# Patient Record
Sex: FEMALE | Race: WHITE | NOT HISPANIC OR LATINO | Employment: OTHER | URBAN - METROPOLITAN AREA
[De-identification: names, ages, dates, MRNs, and addresses within clinical notes are randomized per-mention and may not be internally consistent; named-entity substitution may affect disease eponyms.]

---

## 2017-01-16 RX ORDER — CARVEDILOL 6.25 MG/1
TABLET ORAL
Qty: 60 TABLET | Refills: 0 | Status: SHIPPED | OUTPATIENT
Start: 2017-01-16 | End: 2018-07-27 | Stop reason: SDUPTHER

## 2017-05-14 ENCOUNTER — APPOINTMENT (OUTPATIENT)
Dept: GENERAL RADIOLOGY | Facility: HOSPITAL | Age: 77
End: 2017-05-14

## 2017-05-14 ENCOUNTER — HOSPITAL ENCOUNTER (EMERGENCY)
Facility: HOSPITAL | Age: 77
Discharge: HOME | End: 2017-05-14
Attending: EMERGENCY MEDICINE | Admitting: EMERGENCY MEDICINE

## 2017-05-14 ENCOUNTER — APPOINTMENT (OUTPATIENT)
Dept: CT IMAGING | Facility: HOSPITAL | Age: 77
End: 2017-05-14

## 2017-05-14 VITALS
DIASTOLIC BLOOD PRESSURE: 61 MMHG | HEART RATE: 63 BPM | TEMPERATURE: 97.4 F | OXYGEN SATURATION: 97 % | HEIGHT: 68 IN | SYSTOLIC BLOOD PRESSURE: 157 MMHG | WEIGHT: 190 LBS | BODY MASS INDEX: 28.79 KG/M2 | RESPIRATION RATE: 18 BRPM

## 2017-05-14 DIAGNOSIS — R42 LIGHTHEADEDNESS: Primary | ICD-10-CM

## 2017-05-14 LAB
ALBUMIN SERPL-MCNC: 3.8 G/DL (ref 3.5–5.2)
ALBUMIN/GLOB SERPL: 0.9 G/DL
ALP SERPL-CCNC: 116 U/L (ref 39–117)
ALT SERPL W P-5'-P-CCNC: 24 U/L (ref 1–33)
ANION GAP SERPL CALCULATED.3IONS-SCNC: 12.9 MMOL/L
AST SERPL-CCNC: 27 U/L (ref 1–32)
BASOPHILS # BLD AUTO: 0.13 10*3/MM3 (ref 0–0.2)
BASOPHILS NFR BLD AUTO: 1.7 % (ref 0–1.5)
BILIRUB SERPL-MCNC: 0.7 MG/DL (ref 0.1–1.2)
BILIRUB UR QL STRIP: NEGATIVE
BUN BLD-MCNC: 13 MG/DL (ref 8–23)
BUN/CREAT SERPL: 19.1 (ref 7–25)
CALCIUM SPEC-SCNC: 9.6 MG/DL (ref 8.6–10.5)
CHLORIDE SERPL-SCNC: 96 MMOL/L (ref 98–107)
CLARITY UR: CLEAR
CO2 SERPL-SCNC: 29.1 MMOL/L (ref 22–29)
COLOR UR: YELLOW
CREAT BLD-MCNC: 0.68 MG/DL (ref 0.57–1)
DEPRECATED RDW RBC AUTO: 42.7 FL (ref 37–54)
EOSINOPHIL # BLD AUTO: 0.64 10*3/MM3 (ref 0–0.7)
EOSINOPHIL NFR BLD AUTO: 8.2 % (ref 0.3–6.2)
ERYTHROCYTE [DISTWIDTH] IN BLOOD BY AUTOMATED COUNT: 13 % (ref 11.7–13)
GFR SERPL CREATININE-BSD FRML MDRD: 84 ML/MIN/1.73
GLOBULIN UR ELPH-MCNC: 4.1 GM/DL
GLUCOSE BLD-MCNC: 146 MG/DL (ref 65–99)
GLUCOSE BLDC GLUCOMTR-MCNC: 130 MG/DL (ref 70–130)
GLUCOSE UR STRIP-MCNC: NEGATIVE MG/DL
HCT VFR BLD AUTO: 40.3 % (ref 35.6–45.5)
HGB BLD-MCNC: 13.7 G/DL (ref 11.9–15.5)
HGB UR QL STRIP.AUTO: NEGATIVE
HOLD SPECIMEN: NORMAL
IMM GRANULOCYTES # BLD: 0.03 10*3/MM3 (ref 0–0.03)
IMM GRANULOCYTES NFR BLD: 0.4 % (ref 0–0.5)
KETONES UR QL STRIP: NEGATIVE
LEUKOCYTE ESTERASE UR QL STRIP.AUTO: NEGATIVE
LYMPHOCYTES # BLD AUTO: 1.91 10*3/MM3 (ref 0.9–4.8)
LYMPHOCYTES NFR BLD AUTO: 24.5 % (ref 19.6–45.3)
MAGNESIUM SERPL-MCNC: 1.7 MG/DL (ref 1.6–2.4)
MCH RBC QN AUTO: 30.7 PG (ref 26.9–32)
MCHC RBC AUTO-ENTMCNC: 34 G/DL (ref 32.4–36.3)
MCV RBC AUTO: 90.4 FL (ref 80.5–98.2)
MONOCYTES # BLD AUTO: 0.47 10*3/MM3 (ref 0.2–1.2)
MONOCYTES NFR BLD AUTO: 6 % (ref 5–12)
NEUTROPHILS # BLD AUTO: 4.61 10*3/MM3 (ref 1.9–8.1)
NEUTROPHILS NFR BLD AUTO: 59.2 % (ref 42.7–76)
NITRITE UR QL STRIP: NEGATIVE
PH UR STRIP.AUTO: 7 [PH] (ref 5–8)
PLATELET # BLD AUTO: 274 10*3/MM3 (ref 140–500)
PMV BLD AUTO: 10.9 FL (ref 6–12)
POTASSIUM BLD-SCNC: 4 MMOL/L (ref 3.5–5.2)
PROT SERPL-MCNC: 7.9 G/DL (ref 6–8.5)
PROT UR QL STRIP: NEGATIVE
RBC # BLD AUTO: 4.46 10*6/MM3 (ref 3.9–5.2)
SODIUM BLD-SCNC: 138 MMOL/L (ref 136–145)
SP GR UR STRIP: 1.02 (ref 1–1.03)
T4 FREE SERPL-MCNC: 1.11 NG/DL (ref 0.93–1.7)
TROPONIN T SERPL-MCNC: <0.01 NG/ML (ref 0–0.03)
TSH SERPL DL<=0.05 MIU/L-ACNC: 5.93 MIU/ML (ref 0.27–4.2)
UROBILINOGEN UR QL STRIP: NORMAL
WBC NRBC COR # BLD: 7.79 10*3/MM3 (ref 4.5–10.7)
WHOLE BLOOD HOLD SPECIMEN: NORMAL

## 2017-05-14 PROCEDURE — 84443 ASSAY THYROID STIM HORMONE: CPT | Performed by: EMERGENCY MEDICINE

## 2017-05-14 PROCEDURE — 85025 COMPLETE CBC W/AUTO DIFF WBC: CPT | Performed by: EMERGENCY MEDICINE

## 2017-05-14 PROCEDURE — 84484 ASSAY OF TROPONIN QUANT: CPT | Performed by: EMERGENCY MEDICINE

## 2017-05-14 PROCEDURE — 93010 ELECTROCARDIOGRAM REPORT: CPT | Performed by: INTERNAL MEDICINE

## 2017-05-14 PROCEDURE — 93005 ELECTROCARDIOGRAM TRACING: CPT

## 2017-05-14 PROCEDURE — 82962 GLUCOSE BLOOD TEST: CPT

## 2017-05-14 PROCEDURE — 71010 HC CHEST PA OR AP: CPT

## 2017-05-14 PROCEDURE — 81003 URINALYSIS AUTO W/O SCOPE: CPT | Performed by: EMERGENCY MEDICINE

## 2017-05-14 PROCEDURE — 83735 ASSAY OF MAGNESIUM: CPT | Performed by: EMERGENCY MEDICINE

## 2017-05-14 PROCEDURE — 80053 COMPREHEN METABOLIC PANEL: CPT | Performed by: EMERGENCY MEDICINE

## 2017-05-14 PROCEDURE — 99284 EMERGENCY DEPT VISIT MOD MDM: CPT

## 2017-05-14 PROCEDURE — 70450 CT HEAD/BRAIN W/O DYE: CPT

## 2017-05-14 PROCEDURE — 84439 ASSAY OF FREE THYROXINE: CPT | Performed by: EMERGENCY MEDICINE

## 2017-05-14 PROCEDURE — 36415 COLL VENOUS BLD VENIPUNCTURE: CPT | Performed by: EMERGENCY MEDICINE

## 2017-05-14 RX ORDER — SODIUM CHLORIDE 0.9 % (FLUSH) 0.9 %
10 SYRINGE (ML) INJECTION AS NEEDED
Status: DISCONTINUED | OUTPATIENT
Start: 2017-05-14 | End: 2017-05-14 | Stop reason: HOSPADM

## 2018-04-23 ENCOUNTER — HOSPITAL ENCOUNTER (OUTPATIENT)
Dept: CARDIOLOGY | Facility: HOSPITAL | Age: 78
Discharge: HOME | End: 2018-04-23
Admitting: INTERNAL MEDICINE

## 2018-04-23 ENCOUNTER — TRANSCRIBE ORDERS (OUTPATIENT)
Dept: ADMINISTRATIVE | Facility: HOSPITAL | Age: 78
End: 2018-04-23

## 2018-04-23 ENCOUNTER — APPOINTMENT (OUTPATIENT)
Dept: CARDIOLOGY | Facility: HOSPITAL | Age: 78
End: 2018-04-23

## 2018-04-23 DIAGNOSIS — R60.9 SWELLING: ICD-10-CM

## 2018-04-23 DIAGNOSIS — R52 PAIN: ICD-10-CM

## 2018-04-23 DIAGNOSIS — R52 PAIN: Primary | ICD-10-CM

## 2018-04-23 LAB
BH CV LOWER VASCULAR LEFT COMMON FEMORAL AUGMENT: NORMAL
BH CV LOWER VASCULAR LEFT COMMON FEMORAL COMPETENT: NORMAL
BH CV LOWER VASCULAR LEFT COMMON FEMORAL COMPRESS: NORMAL
BH CV LOWER VASCULAR LEFT COMMON FEMORAL PHASIC: NORMAL
BH CV LOWER VASCULAR LEFT COMMON FEMORAL SPONT: NORMAL
BH CV LOWER VASCULAR RIGHT COMMON FEMORAL AUGMENT: NORMAL
BH CV LOWER VASCULAR RIGHT COMMON FEMORAL COMPETENT: NORMAL
BH CV LOWER VASCULAR RIGHT COMMON FEMORAL COMPRESS: NORMAL
BH CV LOWER VASCULAR RIGHT COMMON FEMORAL PHASIC: NORMAL
BH CV LOWER VASCULAR RIGHT COMMON FEMORAL SPONT: NORMAL
BH CV LOWER VASCULAR RIGHT DISTAL FEMORAL COMPRESS: NORMAL
BH CV LOWER VASCULAR RIGHT GASTRONEMIUS COMPRESS: NORMAL
BH CV LOWER VASCULAR RIGHT GREATER SAPH AK COMPRESS: NORMAL
BH CV LOWER VASCULAR RIGHT GREATER SAPH BK COMPRESS: NORMAL
BH CV LOWER VASCULAR RIGHT MID FEMORAL AUGMENT: NORMAL
BH CV LOWER VASCULAR RIGHT MID FEMORAL COMPETENT: NORMAL
BH CV LOWER VASCULAR RIGHT MID FEMORAL COMPRESS: NORMAL
BH CV LOWER VASCULAR RIGHT MID FEMORAL PHASIC: NORMAL
BH CV LOWER VASCULAR RIGHT MID FEMORAL SPONT: NORMAL
BH CV LOWER VASCULAR RIGHT PERONEAL COMPRESS: NORMAL
BH CV LOWER VASCULAR RIGHT POPLITEAL AUGMENT: NORMAL
BH CV LOWER VASCULAR RIGHT POPLITEAL COMPETENT: NORMAL
BH CV LOWER VASCULAR RIGHT POPLITEAL COMPRESS: NORMAL
BH CV LOWER VASCULAR RIGHT POPLITEAL PHASIC: NORMAL
BH CV LOWER VASCULAR RIGHT POPLITEAL SPONT: NORMAL
BH CV LOWER VASCULAR RIGHT POSTERIOR TIBIAL COMPRESS: NORMAL
BH CV LOWER VASCULAR RIGHT PROXIMAL FEMORAL COMPRESS: NORMAL
BH CV LOWER VASCULAR RIGHT SAPHENOFEMORAL JUNCTION AUGMENT: NORMAL
BH CV LOWER VASCULAR RIGHT SAPHENOFEMORAL JUNCTION COMPETENT: NORMAL
BH CV LOWER VASCULAR RIGHT SAPHENOFEMORAL JUNCTION COMPRESS: NORMAL
BH CV LOWER VASCULAR RIGHT SAPHENOFEMORAL JUNCTION PHASIC: NORMAL
BH CV LOWER VASCULAR RIGHT SAPHENOFEMORAL JUNCTION SPONT: NORMAL

## 2018-04-23 PROCEDURE — 93971 EXTREMITY STUDY: CPT

## 2018-07-27 ENCOUNTER — TELEPHONE (OUTPATIENT)
Dept: CARDIOLOGY | Facility: CLINIC | Age: 78
End: 2018-07-27

## 2018-07-27 ENCOUNTER — OFFICE VISIT (OUTPATIENT)
Dept: CARDIOLOGY | Facility: CLINIC | Age: 78
End: 2018-07-27

## 2018-07-27 ENCOUNTER — LAB (OUTPATIENT)
Dept: LAB | Facility: HOSPITAL | Age: 78
End: 2018-07-27

## 2018-07-27 VITALS
SYSTOLIC BLOOD PRESSURE: 160 MMHG | DIASTOLIC BLOOD PRESSURE: 80 MMHG | WEIGHT: 195.4 LBS | HEART RATE: 115 BPM | HEIGHT: 68 IN | BODY MASS INDEX: 29.61 KG/M2

## 2018-07-27 DIAGNOSIS — E78.49 OTHER HYPERLIPIDEMIA: ICD-10-CM

## 2018-07-27 DIAGNOSIS — I48.0 PAROXYSMAL ATRIAL FIBRILLATION (HCC): Primary | ICD-10-CM

## 2018-07-27 DIAGNOSIS — I48.91 ATRIAL FIBRILLATION, NEW ONSET (HCC): ICD-10-CM

## 2018-07-27 DIAGNOSIS — I48.0 PAROXYSMAL ATRIAL FIBRILLATION (HCC): ICD-10-CM

## 2018-07-27 DIAGNOSIS — I10 ESSENTIAL HYPERTENSION: ICD-10-CM

## 2018-07-27 LAB
ANION GAP SERPL CALCULATED.3IONS-SCNC: 14.1 MMOL/L
BUN BLD-MCNC: 14 MG/DL (ref 8–23)
BUN/CREAT SERPL: 17.7 (ref 7–25)
CALCIUM SPEC-SCNC: 9.8 MG/DL (ref 8.6–10.5)
CHLORIDE SERPL-SCNC: 98 MMOL/L (ref 98–107)
CO2 SERPL-SCNC: 27.9 MMOL/L (ref 22–29)
CREAT BLD-MCNC: 0.79 MG/DL (ref 0.57–1)
GFR SERPL CREATININE-BSD FRML MDRD: 71 ML/MIN/1.73
GLUCOSE BLD-MCNC: 140 MG/DL (ref 65–99)
POTASSIUM BLD-SCNC: 4.5 MMOL/L (ref 3.5–5.2)
SODIUM BLD-SCNC: 140 MMOL/L (ref 136–145)
TSH SERPL DL<=0.05 MIU/L-ACNC: 3.06 MIU/ML (ref 0.27–4.2)

## 2018-07-27 PROCEDURE — 80048 BASIC METABOLIC PNL TOTAL CA: CPT

## 2018-07-27 PROCEDURE — 84443 ASSAY THYROID STIM HORMONE: CPT

## 2018-07-27 PROCEDURE — 99214 OFFICE O/P EST MOD 30 MIN: CPT | Performed by: NURSE PRACTITIONER

## 2018-07-27 PROCEDURE — 93000 ELECTROCARDIOGRAM COMPLETE: CPT | Performed by: NURSE PRACTITIONER

## 2018-07-27 PROCEDURE — 36415 COLL VENOUS BLD VENIPUNCTURE: CPT

## 2018-07-27 RX ORDER — CARVEDILOL 6.25 MG/1
12.5 TABLET ORAL 2 TIMES DAILY
Qty: 60 TABLET | Refills: 0 | Status: SHIPPED | OUTPATIENT
Start: 2018-07-27 | End: 2018-08-29 | Stop reason: SDUPTHER

## 2018-07-27 RX ORDER — LEVOTHYROXINE SODIUM 0.03 MG/1
TABLET ORAL
COMMUNITY

## 2018-07-27 NOTE — TELEPHONE ENCOUNTER
Spoke with patient about stable BMP and normal TSH.  She is scheduled for echocardiogram next Tuesday  AL

## 2018-07-27 NOTE — PROGRESS NOTES
Date of Office Visit: 2018  Encounter Provider: ESTEVAN Zafar  Place of Service: [facility] CARDIOLOGY  Patient Name: Jordana Hdz  :1940    Chief Complaint   Patient presents with   • Atrial Fibrillation   • Hypertension   • Palpitations   • Shortness of Breath   • Dizziness   • Fatigue   :     HPI: Jordana Hdz is a 77 y.o. female is a patient of Dr. Pérez. I am seeing her today for the first time and have reviewed her record.     Her past medical history is significant of hypertension, hyperlipidemia, and atrial flutter.    History of palpitation and has had 1 episode of atrial flutter.  Ablation was discussed but it was declined.  She also had PVCs.  She was last seen in the office in 2016.  She had an occasional skipped beat sensation which was rare but no fluttering sensation.    SHe had an ER visit ion 2017 with complaint of dizziness and a near syncope episdoe which had resolved upon arrival.SHe also reported the week prior having anepisode of rapid palpitation which resolved quickly. TSH was elevated, Cr0.68, GFR84. EKG NSR. She was advised to follow up with cardiologist.    Patient presents today  and denies chest pain. She has been experiencing dizziness with standing x3 weeks as well as increased dyspnea on exertion, and occasional fatigue. She checked her pulse which was irregular and rapid. She did not call our office. She is finishing antibiotic for a salivary gland infection. Her pulse yesterday was 111 blood pressure was 116/70.She has been taking ASA 81 mg daily and coreg 6.25 BID. She does snore and has never been tested for sleep apnea.      No Known Allergies    Past Medical History:   Diagnosis Date   • Atrial flutter (CMS/HCC)    • Hyperlipidemia    • Hypertension    • Lightheadedness    • Palpitations        Past Surgical History:   Procedure Laterality Date   • CATARACT EXTRACTION     • TUBAL ABDOMINAL LIGATION    "        Family and social history reviewed.     Review of Systems   Constitution: Positive for malaise/fatigue.   Cardiovascular: Positive for dyspnea on exertion and palpitations.   Respiratory: Positive for shortness of breath and snoring.    Musculoskeletal: Positive for joint pain.   Neurological: Positive for light-headedness (with standing).     All other systems were reviewed and are negative          Objective:     Vitals:    07/27/18 1005   BP: 160/80   BP Location: Left arm   Patient Position: Sitting   Pulse: 115   Weight: 88.6 kg (195 lb 6.4 oz)   Height: 172.7 cm (68\")     Body mass index is 29.71 kg/m².    PHYSICAL EXAM:  Physical Exam   Constitutional: She is oriented to person, place, and time. She appears well-developed and well-nourished. No distress.   HENT:   Head: Normocephalic.   Eyes: Conjunctivae are normal.   Neck: Normal range of motion. No JVD present.   Cardiovascular: Normal heart sounds and intact distal pulses.  An irregular rhythm present. Tachycardia present.    No murmur heard.  Pulses:       Carotid pulses are 2+ on the right side, and 2+ on the left side.       Radial pulses are 2+ on the right side, and 2+ on the left side.        Posterior tibial pulses are 2+ on the right side, and 2+ on the left side.   Pulmonary/Chest: Effort normal and breath sounds normal. No respiratory distress. She has no wheezes. She has no rhonchi. She has no rales. She exhibits no tenderness.   Abdominal: Soft. Bowel sounds are normal. She exhibits no distension.   Musculoskeletal: Normal range of motion. She exhibits no edema.   Neurological: She is alert and oriented to person, place, and time.   Skin: Skin is warm, dry and intact. No rash noted. She is not diaphoretic. No cyanosis.   Psychiatric: She has a normal mood and affect. Her behavior is normal. Judgment and thought content normal.         ECG 12 Lead  Date/Time: 7/27/2018 9:06 PM  Performed by: SACR JENKINS  Authorized by: SCAR JENKINS "   Comparison: compared with previous ECG from 9/7/2016  Comparison to previous ECG: New atrial fibrillation  Rhythm: atrial fibrillation  Ectopy: PVCs  Rate: tachycardic  BPM: 115  QRS axis: normal  Clinical impression: abnormal ECG and dysrhythmia - atrial            Current Outpatient Prescriptions   Medication Sig Dispense Refill   • atorvastatin (LIPITOR) 40 MG tablet Take  by mouth daily.     • carvedilol (COREG) 6.25 MG tablet Take 2 tablets by mouth 2 (Two) Times a Day. 60 tablet 0   • Cetirizine HCl (ZYRTEC ALLERGY) 10 MG capsule Take  by mouth.     • indapamide (LOZOL) 1.25 MG tablet Take  by mouth daily.     • levothyroxine (SYNTHROID, LEVOTHROID) 25 MCG tablet Take 25 mcg by mouth Daily.     • omeprazole OTC (PRILOSEC OTC) 20 MG EC tablet Take  by mouth daily.     • sertraline (ZOLOFT) 50 MG tablet Take  by mouth daily.     • apixaban (ELIQUIS) 5 MG tablet tablet Take 1 tablet by mouth Every 12 (Twelve) Hours. 60 tablet 11     No current facility-administered medications for this visit.      Assessment:       Diagnosis Plan   1. Paroxysmal atrial fibrillation (CMS/HCC)  Basic Metabolic Panel    TSH   2. Atrial fibrillation, new onset (CMS/HCC)  Adult Transthoracic Echo Complete W/ Cont if Necessary Per Protocol   3. Essential hypertension          Orders Placed This Encounter   Procedures   • Basic Metabolic Panel     Standing Status:   Future     Number of Occurrences:   1     Standing Expiration Date:   7/28/2019   • TSH     Standing Status:   Future     Number of Occurrences:   1     Standing Expiration Date:   7/28/2019   • ECG 12 Lead     This order was created via procedure documentation   • Adult Transthoracic Echo Complete W/ Cont if Necessary Per Protocol     Standing Status:   Future     Standing Expiration Date:   7/27/2019     Order Specific Question:   Reason for exam?     Answer:   Arrhythmia     Order Specific Question:   Arrhythmias specification?     Answer:   AFib         Plan:   1.  Paroxysmal atrial fibrillation- Heart rate today 115. This is new for her since one episode of atrial flutter. Will increase carvedilol to 12.5 mg BID. BMP today WNL, TSH normal. Start Eliquis 5 mg BID. Samples supplied. She will monitor home heart rates and blood pressure. She will also have an echocardiogram.  Atrial Fibrillation and Atrial Flutter  Assessment  • The patient has paroxysmal atrial fibrillation  • The patient's CHADS2-VASc score is 4  • A [id number] score of 2 or more is considered a high risk for a thromboembolic event    Plan  • Add apixaban for antithrombotic therapy  • Continue beta blocker for rhythm control    2. PVCs  3. Hypertension blood pressure elevated today 160/80. Patient has lower values at home such as 110/70  4. Suspected sleep apnea she will consider being tested in the future  5.Hypothyroidism on replacement therapy  6.Hyperlipidemia on target dose atorvastatin  7.. Salivary gland infection completing antibiotic therapy    Plan of care reviewed with Dr. Pérez  Follow up in 4 weeks with Dr. Pérez    Patient was instructed to call the office if new symptoms develop or report to nearest ER if heart attack or stroke is suspected.        It has been a pleasure to participate in this patient's care.      Thank you,  ESTEVAN Zafar      **Lucius Disclaimer:**  Much of this encounter note is an electronic transcription/translation of spoken language to printed text. The electronic translation of spoken language may permit erroneous, or at times, nonsensical words or phrases to be inadvertently transcribed. Although I have reviewed the note for such errors, some may still exist.

## 2018-07-31 ENCOUNTER — TELEPHONE (OUTPATIENT)
Dept: CARDIOLOGY | Facility: CLINIC | Age: 78
End: 2018-07-31

## 2018-07-31 ENCOUNTER — HOSPITAL ENCOUNTER (OUTPATIENT)
Dept: CARDIOLOGY | Facility: HOSPITAL | Age: 78
Discharge: HOME | End: 2018-07-31
Admitting: NURSE PRACTITIONER

## 2018-07-31 VITALS
BODY MASS INDEX: 29.55 KG/M2 | HEIGHT: 68 IN | HEART RATE: 100 BPM | DIASTOLIC BLOOD PRESSURE: 68 MMHG | SYSTOLIC BLOOD PRESSURE: 128 MMHG | WEIGHT: 195 LBS

## 2018-07-31 DIAGNOSIS — I48.91 ATRIAL FIBRILLATION, NEW ONSET (HCC): ICD-10-CM

## 2018-07-31 LAB
ASCENDING AORTA: 2.7 CM
BH CV ECHO MEAS - ACS: 1.7 CM
BH CV ECHO MEAS - AO MAX PG (FULL): 3.8 MMHG
BH CV ECHO MEAS - AO MAX PG: 8.4 MMHG
BH CV ECHO MEAS - AO MEAN PG (FULL): 2.8 MMHG
BH CV ECHO MEAS - AO MEAN PG: 5.1 MMHG
BH CV ECHO MEAS - AO ROOT AREA (BSA CORRECTED): 1.2
BH CV ECHO MEAS - AO ROOT AREA: 4.6 CM^2
BH CV ECHO MEAS - AO ROOT DIAM: 2.4 CM
BH CV ECHO MEAS - AO V2 MAX: 144.8 CM/SEC
BH CV ECHO MEAS - AO V2 MEAN: 106.2 CM/SEC
BH CV ECHO MEAS - AO V2 VTI: 27.3 CM
BH CV ECHO MEAS - AVA(I,A): 2.1 CM^2
BH CV ECHO MEAS - AVA(I,D): 2.1 CM^2
BH CV ECHO MEAS - AVA(V,A): 1.9 CM^2
BH CV ECHO MEAS - AVA(V,D): 1.9 CM^2
BH CV ECHO MEAS - BSA(HAYCOCK): 2.1 M^2
BH CV ECHO MEAS - BSA: 2 M^2
BH CV ECHO MEAS - BZI_BMI: 29.6 KILOGRAMS/M^2
BH CV ECHO MEAS - BZI_METRIC_HEIGHT: 172.7 CM
BH CV ECHO MEAS - BZI_METRIC_WEIGHT: 88.5 KG
BH CV ECHO MEAS - CONTRAST EF (2CH): 58.9 ML/M^2
BH CV ECHO MEAS - CONTRAST EF 4CH: 63.1 ML/M^2
BH CV ECHO MEAS - EDV(MOD-SP2): 73 ML
BH CV ECHO MEAS - EDV(MOD-SP4): 84 ML
BH CV ECHO MEAS - EDV(TEICH): 89.3 ML
BH CV ECHO MEAS - EF(CUBED): 78.8 %
BH CV ECHO MEAS - EF(MOD-BP): 61 %
BH CV ECHO MEAS - EF(MOD-SP2): 58.9 %
BH CV ECHO MEAS - EF(MOD-SP4): 63.1 %
BH CV ECHO MEAS - EF(TEICH): 71.3 %
BH CV ECHO MEAS - ESV(MOD-SP2): 30 ML
BH CV ECHO MEAS - ESV(MOD-SP4): 31 ML
BH CV ECHO MEAS - ESV(TEICH): 25.7 ML
BH CV ECHO MEAS - IVS/LVPW: 1.1
BH CV ECHO MEAS - IVSD: 1 CM
BH CV ECHO MEAS - LAT PEAK E' VEL: 14 CM/SEC
BH CV ECHO MEAS - LV DIASTOLIC VOL/BSA (35-75): 41.5 ML/M^2
BH CV ECHO MEAS - LV MASS(C)D: 144.9 GRAMS
BH CV ECHO MEAS - LV MASS(C)DI: 71.6 GRAMS/M^2
BH CV ECHO MEAS - LV MAX PG: 4.6 MMHG
BH CV ECHO MEAS - LV MEAN PG: 2.3 MMHG
BH CV ECHO MEAS - LV SYSTOLIC VOL/BSA (12-30): 15.3 ML/M^2
BH CV ECHO MEAS - LV V1 MAX: 107.1 CM/SEC
BH CV ECHO MEAS - LV V1 MEAN: 70.5 CM/SEC
BH CV ECHO MEAS - LV V1 VTI: 21.8 CM
BH CV ECHO MEAS - LVIDD: 4.4 CM
BH CV ECHO MEAS - LVIDS: 2.6 CM
BH CV ECHO MEAS - LVLD AP2: 6.8 CM
BH CV ECHO MEAS - LVLD AP4: 7.1 CM
BH CV ECHO MEAS - LVLS AP2: 5.8 CM
BH CV ECHO MEAS - LVLS AP4: 5.7 CM
BH CV ECHO MEAS - LVOT AREA (M): 2.5 CM^2
BH CV ECHO MEAS - LVOT AREA: 2.6 CM^2
BH CV ECHO MEAS - LVOT DIAM: 1.8 CM
BH CV ECHO MEAS - LVPWD: 0.94 CM
BH CV ECHO MEAS - MED PEAK E' VEL: 10 CM/SEC
BH CV ECHO MEAS - MR MAX PG: 36.2 MMHG
BH CV ECHO MEAS - MR MAX VEL: 301 CM/SEC
BH CV ECHO MEAS - MV DEC TIME: 0.19 SEC
BH CV ECHO MEAS - MV E MAX VEL: 121 CM/SEC
BH CV ECHO MEAS - MV MAX PG: 5.2 MMHG
BH CV ECHO MEAS - MV MEAN PG: 2.1 MMHG
BH CV ECHO MEAS - MV V2 MAX: 114.4 CM/SEC
BH CV ECHO MEAS - MV V2 MEAN: 65.4 CM/SEC
BH CV ECHO MEAS - MV V2 VTI: 18.2 CM
BH CV ECHO MEAS - MVA(VTI): 3.1 CM^2
BH CV ECHO MEAS - PA ACC TIME: 0.11 SEC
BH CV ECHO MEAS - PA MAX PG (FULL): 2.2 MMHG
BH CV ECHO MEAS - PA MAX PG: 3.7 MMHG
BH CV ECHO MEAS - PA PR(ACCEL): 29.9 MMHG
BH CV ECHO MEAS - PA V2 MAX: 96.4 CM/SEC
BH CV ECHO MEAS - PULM A REVS DUR: 0.11 SEC
BH CV ECHO MEAS - PULM A REVS VEL: 18.2 CM/SEC
BH CV ECHO MEAS - PULM DIAS VEL: 55.4 CM/SEC
BH CV ECHO MEAS - PULM S/D: 0.82
BH CV ECHO MEAS - PULM SYS VEL: 45.2 CM/SEC
BH CV ECHO MEAS - RAP SYSTOLE: 3 MMHG
BH CV ECHO MEAS - RV MAX PG: 1.5 MMHG
BH CV ECHO MEAS - RV MEAN PG: 0.84 MMHG
BH CV ECHO MEAS - RV V1 MAX: 61.6 CM/SEC
BH CV ECHO MEAS - RV V1 MEAN: 44.2 CM/SEC
BH CV ECHO MEAS - RV V1 VTI: 11.8 CM
BH CV ECHO MEAS - RVSP: 26 MMHG
BH CV ECHO MEAS - SI(AO): 62.8 ML/M^2
BH CV ECHO MEAS - SI(CUBED): 34 ML/M^2
BH CV ECHO MEAS - SI(LVOT): 27.7 ML/M^2
BH CV ECHO MEAS - SI(MOD-SP2): 21.3 ML/M^2
BH CV ECHO MEAS - SI(MOD-SP4): 26.2 ML/M^2
BH CV ECHO MEAS - SI(TEICH): 31.5 ML/M^2
BH CV ECHO MEAS - SUP REN AO DIAM: 1.5 CM
BH CV ECHO MEAS - SV(AO): 126.9 ML
BH CV ECHO MEAS - SV(CUBED): 68.7 ML
BH CV ECHO MEAS - SV(LVOT): 56 ML
BH CV ECHO MEAS - SV(MOD-SP2): 43 ML
BH CV ECHO MEAS - SV(MOD-SP4): 53 ML
BH CV ECHO MEAS - SV(TEICH): 63.7 ML
BH CV ECHO MEAS - TAPSE (>1.6): 2.3 CM2
BH CV ECHO MEAS - TR MAX VEL: 240 CM/SEC
BH CV ECHO MEASUREMENTS AVERAGE E/E' RATIO: 10.08
BH CV XLRA - RV BASE: 2.3 CM
BH CV XLRA - TDI S': 2.7 CM/SEC
LEFT ATRIUM VOLUME INDEX: 30 ML/M2
SINUS: 2.3 CM
STJ: 2.3 CM

## 2018-07-31 PROCEDURE — 93306 TTE W/DOPPLER COMPLETE: CPT | Performed by: INTERNAL MEDICINE

## 2018-07-31 PROCEDURE — 25010000002 PERFLUTREN (DEFINITY) 8.476 MG IN SODIUM CHLORIDE 0.9 % 10 ML INJECTION: Performed by: NURSE PRACTITIONER

## 2018-07-31 PROCEDURE — 93306 TTE W/DOPPLER COMPLETE: CPT

## 2018-07-31 RX ADMIN — PERFLUTREN 1.5 ML: 6.52 INJECTION, SUSPENSION INTRAVENOUS at 14:48

## 2018-07-31 NOTE — TELEPHONE ENCOUNTER
Spoke with patient. Echo results discussed.    Taking coreg 12.5 BID. HR have been 80-90    BP /70-80 rare diastolic 90 value occurred once.    Current regime will continue.  Patient verbalized understanding.    AL

## 2018-08-13 ENCOUNTER — TELEPHONE (OUTPATIENT)
Dept: CARDIOLOGY | Facility: CLINIC | Age: 78
End: 2018-08-13

## 2018-08-13 NOTE — TELEPHONE ENCOUNTER
Spoke with patient. HR much improved on 12.5 mg BID carvedilol. 70-90s. Reassured that if she takes her medication as directed she will be ok on the trip. She actually see's Dr. Pérez the week before taking the trip.        AL

## 2018-08-13 NOTE — TELEPHONE ENCOUNTER
8/13/18  Pt left vmsg on Dr. MARIA's vm but directed to [facility] she is planning trip to [place] and will be at a 9,000 foot altitude and wondered if it will affect her afib/her.  Her ph [phone]/krissy

## 2018-08-29 ENCOUNTER — OFFICE VISIT (OUTPATIENT)
Dept: CARDIOLOGY | Facility: CLINIC | Age: 78
End: 2018-08-29

## 2018-08-29 VITALS
BODY MASS INDEX: 29.46 KG/M2 | SYSTOLIC BLOOD PRESSURE: 124 MMHG | HEART RATE: 112 BPM | WEIGHT: 194.4 LBS | HEIGHT: 68 IN | DIASTOLIC BLOOD PRESSURE: 84 MMHG

## 2018-08-29 DIAGNOSIS — I48.0 PAROXYSMAL ATRIAL FIBRILLATION (HCC): Primary | ICD-10-CM

## 2018-08-29 DIAGNOSIS — E78.2 MIXED HYPERLIPIDEMIA: ICD-10-CM

## 2018-08-29 DIAGNOSIS — I10 ESSENTIAL HYPERTENSION: ICD-10-CM

## 2018-08-29 PROCEDURE — 93000 ELECTROCARDIOGRAM COMPLETE: CPT | Performed by: INTERNAL MEDICINE

## 2018-08-29 PROCEDURE — 99213 OFFICE O/P EST LOW 20 MIN: CPT | Performed by: INTERNAL MEDICINE

## 2018-08-29 RX ORDER — CARVEDILOL 25 MG/1
25 TABLET ORAL 2 TIMES DAILY
Qty: 60 TABLET | Refills: 6 | Status: SHIPPED | OUTPATIENT
Start: 2018-08-29 | End: 2019-03-23 | Stop reason: SDUPTHER

## 2018-08-29 RX ORDER — OMEPRAZOLE 10 MG/1
20 CAPSULE, DELAYED RELEASE ORAL
COMMUNITY
End: 2020-06-29 | Stop reason: DRUGHIGH

## 2018-08-29 NOTE — PROGRESS NOTES
Date of Office Visit: 18  Encounter Provider: Guicho Pérez MD  Place of Service: [facility] CARDIOLOGY  Patient Name: Jordana Hdz  :1940  Referral Provider:No ref. provider found      Chief Complaint   Patient presents with   • Atrial Fibrillation     1 mos follow up     History of Present Illness  The patient is a pleasant 76 yo who we saw in 2016 with palpitations it appeared that she had an episode of atrial flutter but that was only episode we discussed further treatment with ablation but she declined.  Had a negative workup at that time including an echocardiogram that was unremarkable.  She was to see us when necessary but then in 2018 developed increasing palpitations came back in the office this time was found to be in atrial flutter she had a repeat echocardiogram that showed normal left ventricular systolic function no significant valvular disease she was started on beta blocker carvedilol and eliquis.  She now comes in for follow-up and unfortunately she still short of breath with increased activity but markedly fatigued no palpitations near syncope or syncope no real chest pain or pressure.      Hypertension   Associated symptoms include malaise/fatigue. Pertinent negatives include no blurred vision or headaches.         Past Medical History:   Diagnosis Date   • Atrial flutter (CMS/HCC)    • Hyperlipidemia    • Hypertension    • Lightheadedness    • Palpitations          Past Surgical History:   Procedure Laterality Date   • CATARACT EXTRACTION     • TUBAL ABDOMINAL LIGATION           Current Outpatient Prescriptions on File Prior to Visit   Medication Sig Dispense Refill   • apixaban (ELIQUIS) 5 MG tablet tablet Take 1 tablet by mouth Every 12 (Twelve) Hours. 60 tablet 11   • atorvastatin (LIPITOR) 40 MG tablet Take  by mouth daily.     • indapamide (LOZOL) 1.25 MG tablet Take  by mouth daily.     • levothyroxine (SYNTHROID,  LEVOTHROID) 25 MCG tablet Take 25 mcg by mouth Daily.     • sertraline (ZOLOFT) 50 MG tablet Take  by mouth daily.     • [DISCONTINUED] carvedilol (COREG) 6.25 MG tablet Take 2 tablets by mouth 2 (Two) Times a Day. 60 tablet 0   • Cetirizine HCl (ZYRTEC ALLERGY) 10 MG capsule Take  by mouth.     • [DISCONTINUED] omeprazole OTC (PRILOSEC OTC) 20 MG EC tablet Take  by mouth daily.       No current facility-administered medications on file prior to visit.          Social History     Social History   • Marital status:      Spouse name: N/A   • Number of children: N/A   • Years of education: N/A     Occupational History   • Not on file.     Social History Main Topics   • Smoking status: Former Smoker   • Smokeless tobacco: Never Used      Comment: caffeine 1.5 cups coffee daily   • Alcohol use Yes   • Drug use: No   • Sexual activity: Not on file     Other Topics Concern   • Not on file     Social History Narrative   • No narrative on file       Family History   Problem Relation Age of Onset   • Heart attack Father    • Aortic aneurysm Father    • Stroke Father    • Hypertension Father    • Atrial fibrillation Brother    • Diabetes Maternal Aunt    • Diabetes Paternal Aunt          Review of Systems   Constitution: Positive for malaise/fatigue. Negative for decreased appetite, diaphoresis, fever, weakness, weight gain and weight loss.   HENT: Negative for congestion, hearing loss, nosebleeds and tinnitus.    Eyes: Negative for blurred vision, double vision, vision loss in left eye, vision loss in right eye and visual disturbance.   Cardiovascular:        As noted in HPI   Respiratory:        As noted HPI   Endocrine: Negative for cold intolerance and heat intolerance.   Hematologic/Lymphatic: Negative for bleeding problem. Does not bruise/bleed easily.   Skin: Negative for color change, flushing, itching and rash.   Musculoskeletal: Negative for arthritis, back pain, joint pain, joint swelling, muscle weakness  "and myalgias.   Gastrointestinal: Negative for bloating, abdominal pain, constipation, diarrhea, dysphagia, heartburn, hematemesis, hematochezia, melena, nausea and vomiting.   Genitourinary: Negative for bladder incontinence, dysuria, frequency, nocturia and urgency.   Neurological: Negative for dizziness, focal weakness, headaches, light-headedness, loss of balance, numbness, paresthesias and vertigo.   Psychiatric/Behavioral: Negative for depression, memory loss and substance abuse.       Procedures      ECG 12 Lead  Date/Time: 8/29/2018 1:42 PM  Performed by: ANDREA RUSHING  Authorized by: ANDREA RUSHING   Comparison: compared with previous ECG   Similar to previous ECG  Rhythm: atrial fibrillation  Rate: normal  QRS axis: normal                  Objective:    /84 (BP Location: Right arm)   Pulse 112   Ht 172.7 cm (68\")   Wt 88.2 kg (194 lb 6.4 oz)   BMI 29.56 kg/m²        Physical Exam  Physical Exam   Constitutional: She is oriented to person, place, and time. She appears well-developed and well-nourished. No distress.   HENT:   Head: Normocephalic.   Eyes: Pupils are equal, round, and reactive to light. Conjunctivae are normal. No scleral icterus.   Neck: Normal carotid pulses, no hepatojugular reflux and no JVD present. Carotid bruit is not present. No tracheal deviation, no edema and no erythema present. No thyromegaly present.   Cardiovascular: S1 normal, S2 normal, normal heart sounds and intact distal pulses.  An irregularly irregular rhythm present.  No extrasystoles are present. Tachycardia present.  PMI is not displaced.  Exam reveals no gallop, no distant heart sounds and no friction rub.    No murmur heard.  Pulses:       Carotid pulses are 2+ on the right side, and 2+ on the left side.       Radial pulses are 2+ on the right side, and 2+ on the left side.        Femoral pulses are 2+ on the right side, and 2+ on the left side.       Dorsalis pedis pulses are 2+ on the right side, " and 2+ on the left side.        Posterior tibial pulses are 2+ on the right side, and 2+ on the left side.   Pulmonary/Chest: Effort normal and breath sounds normal. No respiratory distress. She has no decreased breath sounds. She has no wheezes. She has no rhonchi. She has no rales. She exhibits no tenderness.   Abdominal: Soft. Bowel sounds are normal. She exhibits no distension and no mass. There is no hepatosplenomegaly. There is no tenderness. There is no rebound and no guarding.   Musculoskeletal: She exhibits no edema, tenderness or deformity.   Neurological: She is alert and oriented to person, place, and time.   Skin: Skin is warm and dry. No rash noted. She is not diaphoretic. No cyanosis or erythema. No pallor. Nails show no clubbing.   Psychiatric: She has a normal mood and affect. Her speech is normal and behavior is normal. Judgment and thought content normal.           Assessment:   1. 77 showed female with paroxysmal atrial flutter and atrial fibrillation.   Atrial Fibrillation and Atrial Flutter  Assessment  • The patient has permanent atrial fibrillation  • This is non-valvular in etiology  • The patient's CHADS2-VASc score is 2  • A [id number] score of 2 or more is considered a high risk for a thromboembolic event  • Apixaban prescribed    Plan  • Continue in atrial fibrillation with rate control  • Continue apixaban for antithrombotic therapy, bleeding issues discussed  • Continue beta blocker for rate control  Her rate is still increased but she still symptomatic.  Her increasing the carvedilol to 25 mg twice a day.  When concerned about her continued symptoms which hopefully will improve with rate control but we are referring her to arrhythmia service.  May be candidate for ablation may need to be treated medically with cardioversion.    2.  Hyperlipidemia on atorvastatin.    We discussed her trip to [place] in the next week which hopefully if her rate gets under better control she'll feel  better and should be fine.  She states hydrated and will call if any issues.         Plan:

## 2018-09-18 ENCOUNTER — OFFICE VISIT (OUTPATIENT)
Dept: CARDIOLOGY | Facility: CLINIC | Age: 78
End: 2018-09-18

## 2018-09-18 VITALS
HEART RATE: 91 BPM | BODY MASS INDEX: 29.1 KG/M2 | SYSTOLIC BLOOD PRESSURE: 144 MMHG | WEIGHT: 192 LBS | HEIGHT: 68 IN | DIASTOLIC BLOOD PRESSURE: 88 MMHG

## 2018-09-18 DIAGNOSIS — I48.19 PERSISTENT ATRIAL FIBRILLATION (HCC): Primary | ICD-10-CM

## 2018-09-18 PROCEDURE — 93000 ELECTROCARDIOGRAM COMPLETE: CPT | Performed by: INTERNAL MEDICINE

## 2018-09-18 PROCEDURE — 99204 OFFICE O/P NEW MOD 45 MIN: CPT | Performed by: INTERNAL MEDICINE

## 2018-09-18 NOTE — PROGRESS NOTES
Date of Office Visit: 2018  Encounter Provider: Fede Smalls MD  Place of Service: [facility] CARDIOLOGY  Patient Name: Jordana Hdz  :1940    Chief Complaint   Patient presents with   • Atrial Fibrillation     New Patient Consult / MI ref PAF   :     HPI: Jordana Hdz is a 77 y.o. female who presents today for persistent atrial fibrillation.  The patient had an episode of atrial flutter in 2016, she was offered ablation but declined at that time.  She has developed increasing palpitations and some increased dyspnea with exertion.  She notices it when walking from her car into building or to mailbox.  She hasn't noticed any other associated factors.  Recent echo demonstrated normal ejection fraction. Past ekgs demonstrate atrial fibrillation.          Past Medical History:   Diagnosis Date   • Atrial flutter (CMS/HCC)    • Hyperlipidemia    • Hypertension    • Lightheadedness    • Palpitations        Past Surgical History:   Procedure Laterality Date   • CATARACT EXTRACTION     • TUBAL ABDOMINAL LIGATION         Social History     Social History   • Marital status:      Spouse name: N/A   • Number of children: N/A   • Years of education: N/A     Occupational History   • Not on file.     Social History Main Topics   • Smoking status: Former Smoker   • Smokeless tobacco: Never Used      Comment: caffeine 1.5 cups coffee daily   • Alcohol use Yes   • Drug use: No   • Sexual activity: Not on file     Other Topics Concern   • Not on file     Social History Narrative   • No narrative on file       Family History   Problem Relation Age of Onset   • Heart attack Father    • Aortic aneurysm Father    • Stroke Father    • Hypertension Father    • Atrial fibrillation Brother    • Diabetes Maternal Aunt    • Diabetes Paternal Aunt        Review of Systems   Constitution: Negative for weakness and malaise/fatigue.   HENT: Negative.    Eyes: Negative.   "  Cardiovascular: Negative for chest pain, dyspnea on exertion, leg swelling and near-syncope.   Respiratory: Negative for cough and shortness of breath.    Endocrine: Negative.    Hematologic/Lymphatic: Negative.    Skin: Negative.    Musculoskeletal: Negative.    Gastrointestinal: Negative.    Genitourinary: Negative.    Neurological: Negative.    Psychiatric/Behavioral: Negative.    Allergic/Immunologic: Negative.        No Known Allergies      Current Outpatient Prescriptions:   •  apixaban (ELIQUIS) 5 MG tablet tablet, Take 1 tablet by mouth Every 12 (Twelve) Hours., Disp: 60 tablet, Rfl: 11  •  atorvastatin (LIPITOR) 40 MG tablet, Take  by mouth daily., Disp: , Rfl:   •  carvedilol (COREG) 25 MG tablet, Take 1 tablet by mouth 2 (Two) Times a Day., Disp: 60 tablet, Rfl: 6  •  Cetirizine HCl (ZYRTEC ALLERGY) 10 MG capsule, Take  by mouth., Disp: , Rfl:   •  indapamide (LOZOL) 1.25 MG tablet, Take  by mouth daily., Disp: , Rfl:   •  levothyroxine (SYNTHROID, LEVOTHROID) 25 MCG tablet, Take 25 mcg by mouth Daily., Disp: , Rfl:   •  omeprazole (prilOSEC) 10 MG capsule, Take 10 mg by mouth., Disp: , Rfl:   •  sertraline (ZOLOFT) 50 MG tablet, Take  by mouth daily., Disp: , Rfl:       Objective:     Vitals:    09/18/18 0925   BP: 144/88   Pulse: 91   Weight: 87.1 kg (192 lb)   Height: 172.7 cm (68\")     Body mass index is 29.19 kg/m².    PHYSICAL EXAM:    Physical Exam   Constitutional: She is oriented to person, place, and time. She appears well-developed and well-nourished. No distress.   HENT:   Head: Normocephalic and atraumatic.   Right Ear: External ear normal.   Left Ear: External ear normal.   Eyes: EOM are normal. Right eye exhibits no discharge. Left eye exhibits no discharge.   Neck: Normal range of motion. Neck supple. No thyromegaly present.   Cardiovascular: Normal rate.  An irregularly irregular rhythm present.   No murmur heard.  Pulmonary/Chest: Effort normal and breath sounds normal. No respiratory " distress.   Abdominal: Soft. Bowel sounds are normal. She exhibits no distension. There is no tenderness.   Musculoskeletal: Normal range of motion. She exhibits no edema or deformity.   Neurological: She is alert and oriented to person, place, and time.   Skin: Skin is warm. She is not diaphoretic.   Psychiatric: She has a normal mood and affect. Her behavior is normal. Judgment and thought content normal.   Nursing note and vitals reviewed.          ECG 12 Lead  Date/Time: 9/18/2018 5:49 PM  Performed by: JOELLE MCCRACKEN  Authorized by: JOELLE MCCRACKEN   Comparison: compared with previous ECG from 7/7/2018  Similar to previous ECG  Rhythm: atrial fibrillation  Clinical impression: abnormal ECG              Assessment:       Diagnosis Plan   1. Persistent atrial fibrillation (CMS/HCC)  Holter Monitor - 24 Hour          Plan:       Symptomatic persistent atrial fibrillation.  We discussed options for treatment including rate vs rhythm control strategy.  I encouraged at least at attempt at cardioversion due to symptoms, but patient is reluctant to undergo at this time.   She was agreeable to Holter monitor to ensure adequate rate control.  Once this is complete, if rate control is ok and no desire to rhythm control strategy this she can resume normal f/u with Dr. Pérez.    As always, it has been a pleasure to participate in your patient's care.      Sincerely,         Joelle Mccracken MD

## 2018-10-09 ENCOUNTER — TELEPHONE (OUTPATIENT)
Dept: CARDIOLOGY | Facility: CLINIC | Age: 78
End: 2018-10-09

## 2019-03-25 RX ORDER — CARVEDILOL 25 MG/1
TABLET ORAL
Qty: 60 TABLET | Refills: 6 | Status: SHIPPED | OUTPATIENT
Start: 2019-03-25 | End: 2019-08-20 | Stop reason: SDUPTHER

## 2019-07-29 ENCOUNTER — OFFICE VISIT (OUTPATIENT)
Dept: CARDIOLOGY | Facility: CLINIC | Age: 79
End: 2019-07-29

## 2019-07-29 VITALS
WEIGHT: 196 LBS | SYSTOLIC BLOOD PRESSURE: 128 MMHG | DIASTOLIC BLOOD PRESSURE: 82 MMHG | HEART RATE: 93 BPM | BODY MASS INDEX: 29.7 KG/M2 | HEIGHT: 68 IN

## 2019-07-29 DIAGNOSIS — E78.2 MIXED HYPERLIPIDEMIA: ICD-10-CM

## 2019-07-29 DIAGNOSIS — I48.19 PERSISTENT ATRIAL FIBRILLATION (HCC): Primary | ICD-10-CM

## 2019-07-29 DIAGNOSIS — I10 ESSENTIAL HYPERTENSION: ICD-10-CM

## 2019-07-29 PROCEDURE — 99214 OFFICE O/P EST MOD 30 MIN: CPT | Performed by: INTERNAL MEDICINE

## 2019-07-29 PROCEDURE — 93000 ELECTROCARDIOGRAM COMPLETE: CPT | Performed by: INTERNAL MEDICINE

## 2019-07-29 NOTE — PROGRESS NOTES
Date of Office Visit: 19  Encounter Provider: Guicho Pérez MD  Place of Service: [facility] CARDIOLOGY  Patient Name: Jordana Hdz  :1940  Referral Provider:Referring, Self      No chief complaint on file.    History of Present Illness  The patient is a pleasant 79 yo who we saw in 2016 with palpitations it appeared that she had an episode of atrial flutter but that was only episode we discussed further treatment with ablation but she declined.  Had a negative workup at that time including an echocardiogram that was unremarkable.  She was to see us when necessary but then in 2018 developed increasing palpitations came back in the office this time was found to be in atrial flutter she had a repeat echocardiogram that showed normal left ventricular systolic function no significant valvular disease she was started on beta blocker carvedilol and eliquis.  Then felt she was having symptoms from the atrial fibrillation we sent her to the arrhythmia service they discussed with her rhythm control versus cardioversion she really did not want cardioversion so she wore a Holter monitor that showed normal rate control and was kept on that.  She now comes in for follow-up. The patient denies chest pain, pressure and heaviness. No shortness of breath, othopnea or PND. No palpitations, near syncope or syncope. No stroke type symptoms like paralysis, paresthesia, abrupt vision loss and dysarthria. No bleeding like blood in the stool or dark stools.  She does feel tired and dizzy pretty much all the time but still worries about having any type of cardioversion.      Hypertension   Associated symptoms include malaise/fatigue. Pertinent negatives include no blurred vision or headaches.         Past Medical History:   Diagnosis Date   • Atrial flutter (CMS/HCC)    • Hyperlipidemia    • Hypertension    • Lightheadedness    • Palpitations          Past Surgical  History:   Procedure Laterality Date   • CATARACT EXTRACTION     • TUBAL ABDOMINAL LIGATION           Current Outpatient Medications on File Prior to Visit   Medication Sig Dispense Refill   • apixaban (ELIQUIS) 5 MG tablet tablet Take 1 tablet by mouth Every 12 (Twelve) Hours. 60 tablet 11   • atorvastatin (LIPITOR) 40 MG tablet Take  by mouth daily.     • carvedilol (COREG) 25 MG tablet TAKE 1 TABLET BY MOUTH TWICE A DAY 60 tablet 6   • Cetirizine HCl (ZYRTEC ALLERGY) 10 MG capsule Take  by mouth.     • indapamide (LOZOL) 1.25 MG tablet Take  by mouth daily.     • levothyroxine (SYNTHROID, LEVOTHROID) 25 MCG tablet Take 25 mcg by mouth Daily.     • omeprazole (prilOSEC) 10 MG capsule Take 10 mg by mouth.     • sertraline (ZOLOFT) 50 MG tablet Take  by mouth daily.       No current facility-administered medications on file prior to visit.          Social History     Socioeconomic History   • Marital status:      Spouse name: Not on file   • Number of children: Not on file   • Years of education: Not on file   • Highest education level: Not on file   Tobacco Use   • Smoking status: Former Smoker   • Smokeless tobacco: Never Used   • Tobacco comment: caffeine 1.5 cups coffee daily   Substance and Sexual Activity   • Alcohol use: Yes   • Drug use: No       Family History   Problem Relation Age of Onset   • Heart attack Father    • Aortic aneurysm Father    • Stroke Father    • Hypertension Father    • Atrial fibrillation Brother    • Diabetes Maternal Aunt    • Diabetes Paternal Aunt          Review of Systems   Constitution: Positive for malaise/fatigue. Negative for decreased appetite, diaphoresis, fever, weakness, weight gain and weight loss.   HENT: Negative for congestion, hearing loss, nosebleeds and tinnitus.    Eyes: Negative for blurred vision, double vision, vision loss in left eye, vision loss in right eye and visual disturbance.   Cardiovascular:        As noted in HPI   Respiratory:        As  noted HPI   Endocrine: Negative for cold intolerance and heat intolerance.   Hematologic/Lymphatic: Negative for bleeding problem. Does not bruise/bleed easily.   Skin: Negative for color change, flushing, itching and rash.   Musculoskeletal: Negative for arthritis, back pain, joint pain, joint swelling, muscle weakness and myalgias.   Gastrointestinal: Negative for bloating, abdominal pain, constipation, diarrhea, dysphagia, heartburn, hematemesis, hematochezia, melena, nausea and vomiting.   Genitourinary: Negative for bladder incontinence, dysuria, frequency, nocturia and urgency.   Neurological: Positive for light-headedness. Negative for dizziness, focal weakness, headaches, loss of balance, numbness, paresthesias and vertigo.   Psychiatric/Behavioral: Negative for depression, memory loss and substance abuse.       Procedures      ECG 12 Lead  Date/Time: 7/29/2019 2:49 PM  Performed by: Guicho Pérez MD  Authorized by: Guicho Pérez MD   Comparison: compared with previous ECG   Similar to previous ECG  Rhythm: atrial fibrillation  Rate: normal  QRS axis: normal                  Objective:    There were no vitals taken for this visit.       Physical Exam  Physical Exam   Constitutional: She is oriented to person, place, and time. She appears well-developed and well-nourished. No distress.   HENT:   Head: Normocephalic.   Eyes: Conjunctivae are normal. Pupils are equal, round, and reactive to light. No scleral icterus.   Neck: Normal carotid pulses, no hepatojugular reflux and no JVD present. Carotid bruit is not present. No tracheal deviation, no edema and no erythema present. No thyromegaly present.   Cardiovascular: Normal rate, S1 normal, S2 normal, normal heart sounds and intact distal pulses. An irregularly irregular rhythm present.  No extrasystoles are present. PMI is not displaced. Exam reveals no gallop, no distant heart sounds and no friction rub.   No murmur heard.  Pulses:       Carotid  pulses are 2+ on the right side, and 2+ on the left side.       Radial pulses are 2+ on the right side, and 2+ on the left side.        Femoral pulses are 2+ on the right side, and 2+ on the left side.       Dorsalis pedis pulses are 2+ on the right side, and 2+ on the left side.        Posterior tibial pulses are 2+ on the right side, and 2+ on the left side.   Pulmonary/Chest: Effort normal and breath sounds normal. No respiratory distress. She has no decreased breath sounds. She has no wheezes. She has no rhonchi. She has no rales. She exhibits no tenderness.   Abdominal: Soft. Bowel sounds are normal. She exhibits no distension and no mass. There is no hepatosplenomegaly. There is no tenderness. There is no rebound and no guarding.   Musculoskeletal: She exhibits no edema, tenderness or deformity.   Neurological: She is alert and oriented to person, place, and time.   Skin: Skin is warm and dry. No rash noted. She is not diaphoretic. No cyanosis or erythema. No pallor. Nails show no clubbing.   Psychiatric: She has a normal mood and affect. Her speech is normal and behavior is normal. Judgment and thought content normal.           Assessment:   1. 78 showed female with paroxysmal atrial flutter and atrial fibrillation.   Atrial Fibrillation and Atrial Flutter  Assessment  • The patient has permanent atrial fibrillation  • This is non-valvular in etiology  • The patient's CHADS2-VASc score is 2  • A [id number]-[id number] score of 2 or more is considered a high risk for a thromboembolic event  • Apixaban prescribed     Plan  • Continue in atrial fibrillation with rate control  • Continue apixaban for antithrombotic therapy, bleeding issues discussed  • Continue beta blocker for rate control  Rate controlled on anticoagulation but does not feel good she says but still reluctant to have cardioversion.  If she would decide to do that since it was no real precipitating event I would consider Rythmol or flecainide and then  cardioversion as odds are she is good to go back out of rhythm again without medical therapy.  She is can continue to think about it we will see us again in follow-up in 1 year and call if problems or questions.     2.  Hyperlipidemia on atorvastatin.   3.  Hypertension blood pressure under adequate control.       Plan:

## 2019-08-19 RX ORDER — APIXABAN 5 MG/1
TABLET, FILM COATED ORAL
Qty: 60 TABLET | Refills: 11 | Status: SHIPPED | OUTPATIENT
Start: 2019-08-19 | End: 2020-08-14

## 2019-08-20 RX ORDER — CARVEDILOL 25 MG/1
TABLET ORAL
Qty: 90 TABLET | Refills: 2 | Status: SHIPPED | OUTPATIENT
Start: 2019-08-20 | End: 2020-06-11

## 2019-11-09 ENCOUNTER — APPOINTMENT (OUTPATIENT)
Dept: GENERAL RADIOLOGY | Facility: HOSPITAL | Age: 79
End: 2019-11-09

## 2019-11-09 ENCOUNTER — HOSPITAL ENCOUNTER (EMERGENCY)
Facility: HOSPITAL | Age: 79
Discharge: HOME | End: 2019-11-09
Attending: EMERGENCY MEDICINE | Admitting: EMERGENCY MEDICINE

## 2019-11-09 VITALS
DIASTOLIC BLOOD PRESSURE: 98 MMHG | SYSTOLIC BLOOD PRESSURE: 143 MMHG | HEART RATE: 112 BPM | WEIGHT: 208.6 LBS | RESPIRATION RATE: 16 BRPM | OXYGEN SATURATION: 99 % | TEMPERATURE: 97.6 F | BODY MASS INDEX: 31.61 KG/M2 | HEIGHT: 68 IN

## 2019-11-09 DIAGNOSIS — I50.9 ACUTE CONGESTIVE HEART FAILURE, UNSPECIFIED HEART FAILURE TYPE (HCC): Primary | ICD-10-CM

## 2019-11-09 LAB
ALBUMIN SERPL-MCNC: 3.9 G/DL (ref 3.5–5.2)
ALBUMIN/GLOB SERPL: 1.1 G/DL
ALP SERPL-CCNC: 109 U/L (ref 39–117)
ALT SERPL W P-5'-P-CCNC: 19 U/L (ref 1–33)
ANION GAP SERPL CALCULATED.3IONS-SCNC: 11.6 MMOL/L (ref 5–15)
AST SERPL-CCNC: 17 U/L (ref 1–32)
BACTERIA UR QL AUTO: ABNORMAL /HPF
BASOPHILS # BLD AUTO: 0.07 10*3/MM3 (ref 0–0.2)
BASOPHILS NFR BLD AUTO: 0.9 % (ref 0–1.5)
BILIRUB SERPL-MCNC: 0.7 MG/DL (ref 0.2–1.2)
BILIRUB UR QL STRIP: NEGATIVE
BUN BLD-MCNC: 9 MG/DL (ref 8–23)
BUN/CREAT SERPL: 12.7 (ref 7–25)
CALCIUM SPEC-SCNC: 8.8 MG/DL (ref 8.6–10.5)
CHLORIDE SERPL-SCNC: 100 MMOL/L (ref 98–107)
CLARITY UR: CLEAR
CO2 SERPL-SCNC: 28.4 MMOL/L (ref 22–29)
COLOR UR: YELLOW
CREAT BLD-MCNC: 0.71 MG/DL (ref 0.57–1)
DEPRECATED RDW RBC AUTO: 42.6 FL (ref 37–54)
EOSINOPHIL # BLD AUTO: 0.2 10*3/MM3 (ref 0–0.4)
EOSINOPHIL NFR BLD AUTO: 2.7 % (ref 0.3–6.2)
ERYTHROCYTE [DISTWIDTH] IN BLOOD BY AUTOMATED COUNT: 12.6 % (ref 12.3–15.4)
GFR SERPL CREATININE-BSD FRML MDRD: 80 ML/MIN/1.73
GLOBULIN UR ELPH-MCNC: 3.6 GM/DL
GLUCOSE BLD-MCNC: 146 MG/DL (ref 65–99)
GLUCOSE UR STRIP-MCNC: NEGATIVE MG/DL
HCT VFR BLD AUTO: 34.7 % (ref 34–46.6)
HGB BLD-MCNC: 11.7 G/DL (ref 12–15.9)
HGB UR QL STRIP.AUTO: ABNORMAL
HOLD SPECIMEN: NORMAL
HOLD SPECIMEN: NORMAL
HYALINE CASTS UR QL AUTO: ABNORMAL /LPF
IMM GRANULOCYTES # BLD AUTO: 0.03 10*3/MM3 (ref 0–0.05)
IMM GRANULOCYTES NFR BLD AUTO: 0.4 % (ref 0–0.5)
KETONES UR QL STRIP: NEGATIVE
LEUKOCYTE ESTERASE UR QL STRIP.AUTO: ABNORMAL
LYMPHOCYTES # BLD AUTO: 1.1 10*3/MM3 (ref 0.7–3.1)
LYMPHOCYTES NFR BLD AUTO: 14.9 % (ref 19.6–45.3)
MCH RBC QN AUTO: 31 PG (ref 26.6–33)
MCHC RBC AUTO-ENTMCNC: 33.7 G/DL (ref 31.5–35.7)
MCV RBC AUTO: 91.8 FL (ref 79–97)
MONOCYTES # BLD AUTO: 0.41 10*3/MM3 (ref 0.1–0.9)
MONOCYTES NFR BLD AUTO: 5.6 % (ref 5–12)
NEUTROPHILS # BLD AUTO: 5.56 10*3/MM3 (ref 1.7–7)
NEUTROPHILS NFR BLD AUTO: 75.5 % (ref 42.7–76)
NITRITE UR QL STRIP: NEGATIVE
NRBC BLD AUTO-RTO: 0 /100 WBC (ref 0–0.2)
NT-PROBNP SERPL-MCNC: 1444 PG/ML (ref 5–1800)
PH UR STRIP.AUTO: 6.5 [PH] (ref 5–8)
PLATELET # BLD AUTO: 225 10*3/MM3 (ref 140–450)
PMV BLD AUTO: 11.2 FL (ref 6–12)
POTASSIUM BLD-SCNC: 3.8 MMOL/L (ref 3.5–5.2)
PROT SERPL-MCNC: 7.5 G/DL (ref 6–8.5)
PROT UR QL STRIP: ABNORMAL
RBC # BLD AUTO: 3.78 10*6/MM3 (ref 3.77–5.28)
RBC # UR: ABNORMAL /HPF
REF LAB TEST METHOD: ABNORMAL
SODIUM BLD-SCNC: 140 MMOL/L (ref 136–145)
SP GR UR STRIP: 1.02 (ref 1–1.03)
SQUAMOUS #/AREA URNS HPF: ABNORMAL /HPF
TROPONIN T SERPL-MCNC: <0.01 NG/ML (ref 0–0.03)
UROBILINOGEN UR QL STRIP: ABNORMAL
WBC NRBC COR # BLD: 7.37 10*3/MM3 (ref 3.4–10.8)
WBC UR QL AUTO: ABNORMAL /HPF
WHOLE BLOOD HOLD SPECIMEN: NORMAL
WHOLE BLOOD HOLD SPECIMEN: NORMAL

## 2019-11-09 PROCEDURE — 93005 ELECTROCARDIOGRAM TRACING: CPT

## 2019-11-09 PROCEDURE — 83880 ASSAY OF NATRIURETIC PEPTIDE: CPT | Performed by: EMERGENCY MEDICINE

## 2019-11-09 PROCEDURE — 25010000002 FUROSEMIDE PER 20 MG: Performed by: NURSE PRACTITIONER

## 2019-11-09 PROCEDURE — 71046 X-RAY EXAM CHEST 2 VIEWS: CPT

## 2019-11-09 PROCEDURE — 84484 ASSAY OF TROPONIN QUANT: CPT | Performed by: EMERGENCY MEDICINE

## 2019-11-09 PROCEDURE — 99284 EMERGENCY DEPT VISIT MOD MDM: CPT

## 2019-11-09 PROCEDURE — 81001 URINALYSIS AUTO W/SCOPE: CPT

## 2019-11-09 PROCEDURE — 96374 THER/PROPH/DIAG INJ IV PUSH: CPT

## 2019-11-09 PROCEDURE — 93005 ELECTROCARDIOGRAM TRACING: CPT | Performed by: EMERGENCY MEDICINE

## 2019-11-09 PROCEDURE — 80053 COMPREHEN METABOLIC PANEL: CPT

## 2019-11-09 PROCEDURE — 93010 ELECTROCARDIOGRAM REPORT: CPT | Performed by: INTERNAL MEDICINE

## 2019-11-09 PROCEDURE — 85025 COMPLETE CBC W/AUTO DIFF WBC: CPT

## 2019-11-09 RX ORDER — FUROSEMIDE 40 MG/1
40 TABLET ORAL DAILY
Qty: 14 TABLET | Refills: 0 | Status: SHIPPED | OUTPATIENT
Start: 2019-11-09 | End: 2019-11-20 | Stop reason: SDUPTHER

## 2019-11-09 RX ORDER — FUROSEMIDE 10 MG/ML
40 INJECTION INTRAMUSCULAR; INTRAVENOUS ONCE
Status: COMPLETED | OUTPATIENT
Start: 2019-11-09 | End: 2019-11-09

## 2019-11-09 RX ORDER — POTASSIUM CHLORIDE 750 MG/1
10 TABLET, FILM COATED, EXTENDED RELEASE ORAL DAILY
Qty: 14 TABLET | Refills: 0 | Status: SHIPPED | OUTPATIENT
Start: 2019-11-09 | End: 2019-11-20 | Stop reason: SDUPTHER

## 2019-11-09 RX ORDER — SODIUM CHLORIDE 0.9 % (FLUSH) 0.9 %
10 SYRINGE (ML) INJECTION AS NEEDED
Status: DISCONTINUED | OUTPATIENT
Start: 2019-11-09 | End: 2019-11-09 | Stop reason: HOSPADM

## 2019-11-09 RX ADMIN — FUROSEMIDE 40 MG: 20 INJECTION, SOLUTION INTRAMUSCULAR; INTRAVENOUS at 13:51

## 2019-11-09 NOTE — ED PROVIDER NOTES
"EMERGENCY DEPARTMENT ENCOUNTER    Room Number:  21/21  Date seen:  11/9/2019  Time seen: 12:30 PM  PCP: Olive Claire MD    HPI:  Chief complaint:SOA  Context:Jordana Hdz is a 78 y.o. female with hx of Afib presents to the ED with c/o constant SOA that has been ongoing but worsened last night as she tried to lay down but the sx increased. Pt admits to fatigue, productive cough, congestion, wheezing, chronic BLE swelling when sitting for long periods of time, and chest \"pressure\". She denies fever, chills, diaphoresis, and N/V. MCDOWELL noted. She notes her HR is normally in the 90's. Pt was moved from her diuretic about 2 weeks ago s/t BP being nml but was on the pills about 25 years.     Onset: gradual  Location:n/a  Radiation: n/a  Duration: ongoing  Timing: constant  Character:SOA  Aggravating Factors: Lying down and exertion  Alleviating Factors: none  Severity: mild    MEDICAL RECORD REVIEW  Pt saw Dr. Pérez (cardio) on 7/29/19 for consistent Afib. Pt decline cardioversion at that time. Pt is on Carvedilol and Eliquis.Pt's last echo was in June 2018 which showed an EF of 61%.    ALLERGIES  Patient has no known allergies.    PAST MEDICAL HISTORY  Active Ambulatory Problems     Diagnosis Date Noted   • No Active Ambulatory Problems     Resolved Ambulatory Problems     Diagnosis Date Noted   • No Resolved Ambulatory Problems     Past Medical History:   Diagnosis Date   • Atrial fibrillation (CMS/HCC)    • Atrial flutter (CMS/HCC)    • Hyperlipidemia    • Hypertension        PAST SURGICAL HISTORY  Past Surgical History:   Procedure Laterality Date   • CATARACT EXTRACTION     • TUBAL ABDOMINAL LIGATION         FAMILY HISTORY  Family History   Problem Relation Age of Onset   • Heart attack Father    • Aortic aneurysm Father    • Stroke Father    • Hypertension Father    • Atrial fibrillation Brother    • Diabetes Maternal Aunt    • Diabetes Paternal Aunt        SOCIAL HISTORY  Social History     Socioeconomic " "History   • Marital status:      Spouse name: Not on file   • Number of children: Not on file   • Years of education: Not on file   • Highest education level: Not on file   Tobacco Use   • Smoking status: Former Smoker   • Smokeless tobacco: Never Used   • Tobacco comment: caffeine 1.5 cups coffee daily   Substance and Sexual Activity   • Alcohol use: Yes   • Drug use: No       REVIEW OF SYSTEMS  Review of Systems   Constitutional: Positive for fatigue. Negative for chills, diaphoresis and fever.   HENT: Positive for congestion.    Eyes: Negative.    Respiratory: Positive for cough (productive), shortness of breath and wheezing.    Cardiovascular: Positive for leg swelling (chronic when sitting for long periods of time). Negative for chest pain.        + Chest \"Pressure\"   Gastrointestinal: Negative for abdominal pain, nausea and vomiting.   Genitourinary: Negative.  Negative for dysuria and hematuria.   Musculoskeletal: Negative.    Skin: Negative.  Negative for rash.   Neurological: Negative.  Negative for syncope and headaches.   Psychiatric/Behavioral: Negative.  Negative for confusion.       PHYSICAL EXAM  ED Triage Vitals [11/09/19 1218]   Temp Heart Rate Resp BP SpO2   -- 112 16 -- 94 %      Temp src Heart Rate Source Patient Position BP Location FiO2 (%)   -- -- -- -- --     Physical Exam   Constitutional: She is oriented to person, place, and time and well-developed, well-nourished, and in no distress. No distress.   HENT:   Head: Normocephalic and atraumatic.   Mouth/Throat: Mucous membranes are normal.   Eyes: Pupils are equal, round, and reactive to light.   Neck: Normal range of motion. Neck supple.   Cardiovascular: Normal rate, regular rhythm and normal heart sounds.   Pulmonary/Chest: Effort normal. Tachypnea (mild) noted. No respiratory distress. She has rales (faint bibasilar).   Abdominal: Soft. There is no tenderness. There is no rebound and no guarding.   Musculoskeletal: She exhibits " edema (1+ BLE).   Neurological: She is alert and oriented to person, place, and time. She has normal strength.   Skin: Skin is warm, dry and intact.   Psychiatric: Mood, affect and judgment normal.   Nursing note and vitals reviewed.      LAB RESULTS  Recent Results (from the past 24 hour(s))   Comprehensive Metabolic Panel    Collection Time: 11/09/19 12:40 PM   Result Value Ref Range    Glucose 146 (H) 65 - 99 mg/dL    BUN 9 8 - 23 mg/dL    Creatinine 0.71 0.57 - 1.00 mg/dL    Sodium 140 136 - 145 mmol/L    Potassium 3.8 3.5 - 5.2 mmol/L    Chloride 100 98 - 107 mmol/L    CO2 28.4 22.0 - 29.0 mmol/L    Calcium 8.8 8.6 - 10.5 mg/dL    Total Protein 7.5 6.0 - 8.5 g/dL    Albumin 3.90 3.50 - 5.20 g/dL    ALT (SGPT) 19 1 - 33 U/L    AST (SGOT) 17 1 - 32 U/L    Alkaline Phosphatase 109 39 - 117 U/L    Total Bilirubin 0.7 0.2 - 1.2 mg/dL    eGFR Non African Amer 80 >60 mL/min/1.73    Globulin 3.6 gm/dL    A/G Ratio 1.1 g/dL    BUN/Creatinine Ratio 12.7 7.0 - 25.0    Anion Gap 11.6 5.0 - 15.0 mmol/L   BNP    Collection Time: 11/09/19 12:40 PM   Result Value Ref Range    proBNP 1,444.0 5.0-1,800.0 pg/mL   Troponin    Collection Time: 11/09/19 12:40 PM   Result Value Ref Range    Troponin T <0.010 0.000 - 0.030 ng/mL   Light Blue Top    Collection Time: 11/09/19 12:40 PM   Result Value Ref Range    Extra Tube hold for add-on    Green Top (Gel)    Collection Time: 11/09/19 12:40 PM   Result Value Ref Range    Extra Tube Hold for add-ons.    Lavender Top    Collection Time: 11/09/19 12:40 PM   Result Value Ref Range    Extra Tube hold for add-on    Gold Top - SST    Collection Time: 11/09/19 12:40 PM   Result Value Ref Range    Extra Tube Hold for add-ons.    CBC Auto Differential    Collection Time: 11/09/19 12:40 PM   Result Value Ref Range    WBC 7.37 3.40 - 10.80 10*3/mm3    RBC 3.78 3.77 - 5.28 10*6/mm3    Hemoglobin 11.7 (L) 12.0 - 15.9 g/dL    Hematocrit 34.7 34.0 - 46.6 %    MCV 91.8 79.0 - 97.0 fL    MCH 31.0 26.6 -  33.0 pg    MCHC 33.7 31.5 - 35.7 g/dL    RDW 12.6 12.3 - 15.4 %    RDW-SD 42.6 37.0 - 54.0 fl    MPV 11.2 6.0 - 12.0 fL    Platelets 225 140 - 450 10*3/mm3    Neutrophil % 75.5 42.7 - 76.0 %    Lymphocyte % 14.9 (L) 19.6 - 45.3 %    Monocyte % 5.6 5.0 - 12.0 %    Eosinophil % 2.7 0.3 - 6.2 %    Basophil % 0.9 0.0 - 1.5 %    Immature Grans % 0.4 0.0 - 0.5 %    Neutrophils, Absolute 5.56 1.70 - 7.00 10*3/mm3    Lymphocytes, Absolute 1.10 0.70 - 3.10 10*3/mm3    Monocytes, Absolute 0.41 0.10 - 0.90 10*3/mm3    Eosinophils, Absolute 0.20 0.00 - 0.40 10*3/mm3    Basophils, Absolute 0.07 0.00 - 0.20 10*3/mm3    Immature Grans, Absolute 0.03 0.00 - 0.05 10*3/mm3    nRBC 0.0 0.0 - 0.2 /100 WBC   Urinalysis With Microscopic If Indicated (No Culture) - Urine, Clean Catch    Collection Time: 11/09/19 12:51 PM   Result Value Ref Range    Color, UA Yellow Yellow, Straw    Appearance, UA Clear Clear    pH, UA 6.5 5.0 - 8.0    Specific Gravity, UA 1.022 1.005 - 1.030    Glucose, UA Negative Negative    Ketones, UA Negative Negative    Bilirubin, UA Negative Negative    Blood, UA Trace (A) Negative    Protein, UA Trace (A) Negative    Leuk Esterase, UA Trace (A) Negative    Nitrite, UA Negative Negative    Urobilinogen, UA 1.0 E.U./dL 0.2 - 1.0 E.U./dL   Urinalysis, Microscopic Only - Urine, Clean Catch    Collection Time: 11/09/19 12:51 PM   Result Value Ref Range    RBC, UA 3-5 (A) None Seen, 0-2 /HPF    WBC, UA 0-2 None Seen, 0-2 /HPF    Bacteria, UA None Seen None Seen /HPF    Squamous Epithelial Cells, UA 0-2 None Seen, 0-2 /HPF    Hyaline Casts, UA 0-2 None Seen /LPF    Methodology Automated Microscopy        I ordered the above labs and reviewed the results    RADIOLOGY  XR Chest 2 View   Final Result   Minimal left pleural effusion and basilar atelectasis,   follow-up suggested. Cardiomegaly.       This report was finalized on 11/9/2019 1:02 PM by Dr. Brendan Wong M.D.              I ordered the above noted  radiological studies and reviewed the images on the PACS system.        MEDICATIONS GIVEN IN ER  Medications   sodium chloride 0.9 % flush 10 mL (not administered)   furosemide (LASIX) injection 40 mg (40 mg Intravenous Given 11/9/19 1351)       EKG  Interpreted by ED Physician  Dr. Johnson    PROCEDURES  Procedures      PROGRESS       1323- Discussed the patient and plan of care with Dr. Johnson. After a bedside evaluation; they agree with the plan of care.    1334- Call placed to Cardiology.      1410: Phone call with Dr. Dolan with cardiology.  Discussed the patient, ER presentation, history, and concerns.  Updated that we gave her 40 mg IV Lasix here in the ER.  She recommends that we hold patient for 2 hours to see if she improves and then call her back for updates.    1435:pt has urinated and does feel improved since the lasix. Pt in NAD and appears more comfortable. Care turned over the Dr Johnson .    Documentation assistance provided by lili Mendoza for Alice BARRETO.  Information recorded by the lili was done at my direction and has been verified and validated by me.         Valerie Mendoza  11/09/19 1341       Alice Chavez APRN  11/09/19 1436

## 2019-11-09 NOTE — ED PROVIDER NOTES
Pt with hx of Afib presents to ED c/o constant, worsening SOA that began a couple days ago. She also c/o fatigue, productive cough, congestion and wheezing. She states sx are exacerbated with walking up steps. She denies CP or recent travel. No other complaints.    Upon exam, pt appears uncomfortably.  No JVD. Lungs show posterior rales bilaterally. Heart is irregular and tachycardic. Abdomen soft, non tender.  Good pulses.     EKG          EKG time:1225  Rhythm/Rate: Afib 105  P waves and FL: N/a  QRS, axis: Nml   ST and T waves: Nonspecific ST/T changes    Interpreted Contemporaneously by me, independently viewed.  Changed compared to prior 7/29/2019 as she was in sinus rhythm at this time.     Discussed with pt plan to discuss pt case with cardiologist and obtain labs and CT chest for further evaluation. Pt understands and agrees with the plan, all questions answered.    1550: Rechecked pt who is resting comfortably. Pt states her pain has subsided at this time and agrees to be discharged home. Pt understands and agrees with the plan, all questions answered.    1618: Phone call with Dr. Dolan to discuss pt case. Dr. Dolan agrees with plan of care and advises pt be put on Lasix with discharge.     1621: Rechecked pt who is states pain is still subsided. Discussed plan to discharge pt with Lasix for further treatment at home. Pt understands and agrees with the plan, all questions answered.    Final diagnoses:   Acute congestive heart failure, unspecified heart failure type (CMS/HCC)       DISCHARGE    Patient discharged in stable condition.    Reviewed implications of results, diagnosis, meds, responsibility to follow up, warning signs and symptoms of possible worsening, potential complications and reasons to return to ER.    Patient/Family voiced understanding of above instructions.    Discussed plan for discharge, as there is no emergent indication for admission. Patient referred to primary care provider for BP  management due to today's BP. Pt/family is agreeable and understands need for follow up and repeat testing.  Pt is aware that discharge does not mean that nothing is wrong but it indicates no emergency is present that requires admission and they must continue care with follow-up as given below or physician of their choice.     FOLLOW-UP  Guicho Pérez MD  [address]  [facility] [address]  [facility] [address]  [phone]    Schedule an appointment as soon as possible for a visit   follow up next week. Return to the ED if symptoms worsen.         Medication List      New Prescriptions    furosemide 40 MG tablet  Commonly known as:  LASIX  Take 1 tablet by mouth Daily.     potassium chloride 10 MEQ CR tablet  Commonly known as:  K-DUR  Take 1 tablet by mouth Daily.            MD ATTESTATION NOTE    The DAVID and I have discussed this patient's history, physical exam, and treatment plan.  I have reviewed the documentation and personally had a face to face interaction with the patient. I affirm the documentation and agree with the treatment and plan.  The attached note describes my personal findings.    Documentation assistance provided by lili Ellsworth for Dr. Johnson.  Information recorded by the scribe was done at my direction and has been verified and validated by me.       Hillary Ellsworth  11/09/19 1621       Alf Johnson MD  11/09/19 [id number]

## 2019-11-20 RX ORDER — POTASSIUM CHLORIDE 750 MG/1
10 TABLET, FILM COATED, EXTENDED RELEASE ORAL DAILY
Qty: 30 TABLET | Refills: 0 | Status: SHIPPED | OUTPATIENT
Start: 2019-11-20 | End: 2019-12-12 | Stop reason: SDUPTHER

## 2019-11-20 RX ORDER — FUROSEMIDE 40 MG/1
40 TABLET ORAL DAILY
Qty: 30 TABLET | Refills: 0 | Status: SHIPPED | OUTPATIENT
Start: 2019-11-20 | End: 2019-11-25 | Stop reason: SDUPTHER

## 2019-11-25 ENCOUNTER — LAB (OUTPATIENT)
Dept: LAB | Facility: HOSPITAL | Age: 79
End: 2019-11-25

## 2019-11-25 ENCOUNTER — OFFICE VISIT (OUTPATIENT)
Dept: CARDIOLOGY | Facility: CLINIC | Age: 79
End: 2019-11-25

## 2019-11-25 ENCOUNTER — TELEPHONE (OUTPATIENT)
Dept: CARDIOLOGY | Facility: CLINIC | Age: 79
End: 2019-11-25

## 2019-11-25 VITALS
BODY MASS INDEX: 29.4 KG/M2 | WEIGHT: 194 LBS | SYSTOLIC BLOOD PRESSURE: 138 MMHG | HEIGHT: 68 IN | HEART RATE: 99 BPM | DIASTOLIC BLOOD PRESSURE: 70 MMHG

## 2019-11-25 DIAGNOSIS — Z51.81 THERAPEUTIC DRUG MONITORING: ICD-10-CM

## 2019-11-25 DIAGNOSIS — Z51.81 ENCOUNTER FOR THERAPEUTIC DRUG LEVEL MONITORING: Primary | ICD-10-CM

## 2019-11-25 DIAGNOSIS — I48.19 PERSISTENT ATRIAL FIBRILLATION (HCC): Primary | ICD-10-CM

## 2019-11-25 DIAGNOSIS — E78.2 MIXED HYPERLIPIDEMIA: ICD-10-CM

## 2019-11-25 DIAGNOSIS — I10 ESSENTIAL HYPERTENSION: ICD-10-CM

## 2019-11-25 LAB
ANION GAP SERPL CALCULATED.3IONS-SCNC: 12.7 MMOL/L (ref 5–15)
BUN BLD-MCNC: 20 MG/DL (ref 8–23)
BUN/CREAT SERPL: 21.7 (ref 7–25)
CALCIUM SPEC-SCNC: 8.7 MG/DL (ref 8.6–10.5)
CHLORIDE SERPL-SCNC: 95 MMOL/L (ref 98–107)
CO2 SERPL-SCNC: 29.3 MMOL/L (ref 22–29)
CREAT BLD-MCNC: 0.92 MG/DL (ref 0.57–1)
GFR SERPL CREATININE-BSD FRML MDRD: 59 ML/MIN/1.73
GLUCOSE BLD-MCNC: 127 MG/DL (ref 65–99)
POTASSIUM BLD-SCNC: 4 MMOL/L (ref 3.5–5.2)
SODIUM BLD-SCNC: 137 MMOL/L (ref 136–145)

## 2019-11-25 PROCEDURE — 80048 BASIC METABOLIC PNL TOTAL CA: CPT

## 2019-11-25 PROCEDURE — 99214 OFFICE O/P EST MOD 30 MIN: CPT | Performed by: NURSE PRACTITIONER

## 2019-11-25 PROCEDURE — 36415 COLL VENOUS BLD VENIPUNCTURE: CPT

## 2019-11-25 PROCEDURE — 93000 ELECTROCARDIOGRAM COMPLETE: CPT | Performed by: NURSE PRACTITIONER

## 2019-11-25 RX ORDER — FUROSEMIDE 40 MG/1
20 TABLET ORAL DAILY
Qty: 30 TABLET | Refills: 0
Start: 2019-11-25 | End: 2019-12-02 | Stop reason: SDUPTHER

## 2019-11-25 NOTE — PROGRESS NOTES
Date of Office Visit: 19  Encounter Provider: ESTEVAN Zafar  Place of Service: [facility] CARDIOLOGY  Patient Name: Jordana Hdz  :1940    Chief Complaint   Patient presents with   • Atrial Fibrillation   • Follow-up   :     HPI: Jordana Hdz is a 79 y.o. female  with history of  hypertension, hyperlipidemia, and atrial flutter. She is followed by Dr. Pérez and I will see her in follow up today      History of palpitation and has had 1 episode of atrial flutter.  Ablation was discussed but it was declined.  She also had PVCs.  She was last seen in the office in 2016.  She had an occasional skipped beat sensation which was rare but no fluttering sensation.     She had an ER visit ion 2017 with complaint of dizziness and a near syncope episdoe which had resolved upon arrival. She also reported the week prior having anepisode of rapid palpitation which resolved quickly. TSH was elevated, Cr0.68, GFR84. EKG NSR.  In 2018 she presented and was in atrial fibrillation with a heart rate of 115.  Carvedilol was increased to 12.5 mg twice daily and she was to start Eliquis.  At that time she was taking an antibiotic for salivary gland infection.  At her 1 month follow-up she continued to have symptoms of increased shortness of breath with activity but no palpitations.  Her heart rate was elevated at 112  so we increase carvedilol to 25 mg twice daily.  She was evaluated by Dr. Smalls and discuss rate versus rhythm control strategy.  24-hour Holter completed 10/2/2018 showed average heart rate 92.  She did not want cardioversion so rate control and anticoagulation was continued.      She presented to the emergency department on 2019 with worsening shortness of breath that began a couple days prior.  proBNP was within normal range.  Chest x-ray showed minimal left pleural effusion and basilar atelectasis.  She received IV Lasix 40 mg x 1  "and was started on by mouth Lasix and potassium replacement.    She presents today for emergency department room follow up.  She tells me that approximately 1 month prior to presenting to the emergency department her indapamide have been stopped by her PCP.  She denies chest pain tightness pressure, edema, near-syncope, or syncope.  Her breathing has returned to her baseline since starting furosemide.  She has not had any follow-up lab work.  She has baseline palpitations and fatigue no near syncope or syncope.    No Known Allergies    Past Medical History:   Diagnosis Date   • Acute congestive heart failure (CMS/HCC)    • Atrial fibrillation (CMS/HCC)    • Atrial flutter (CMS/HCC)    • Hyperlipidemia    • Hypertension        Past Surgical History:   Procedure Laterality Date   • CATARACT EXTRACTION     • TUBAL ABDOMINAL LIGATION           Family and social history reviewed.     Review of Systems   Constitution: Positive for malaise/fatigue.   Cardiovascular: Positive for dyspnea on exertion and palpitations.   Neurological: Positive for light-headedness.     All other systems were reviewed and are negative          Objective:     Vitals:    11/25/19 1306   BP: 138/70   BP Location: Left arm   Patient Position: Sitting   Pulse: 99   Weight: 88 kg (194 lb)   Height: 172.7 cm (68\")     Body mass index is 29.5 kg/m².    PHYSICAL EXAM:  Physical Exam   Constitutional: She is oriented to person, place, and time. She appears well-developed and well-nourished. No distress.   HENT:   Head: Normocephalic.   Eyes: Conjunctivae are normal.   Neck: Normal range of motion. No JVD present.   Cardiovascular: Normal rate, normal heart sounds and intact distal pulses. An irregularly irregular rhythm present.   No murmur heard.  Pulses:       Carotid pulses are 2+ on the right side, and 2+ on the left side.       Radial pulses are 2+ on the right side, and 2+ on the left side.        Posterior tibial pulses are 2+ on the right side, " "and 2+ on the left side.   Pulmonary/Chest: Effort normal and breath sounds normal. No respiratory distress. She has no wheezes. She has no rhonchi. She has no rales. She exhibits no tenderness.   Abdominal: Soft. Bowel sounds are normal. She exhibits no distension.   Musculoskeletal: Normal range of motion. She exhibits no edema (\"puffy\" nonpitting bilateral lower extremities ankle/pretibial).   Neurological: She is alert and oriented to person, place, and time.   Skin: Skin is warm, dry and intact. No rash noted. She is not diaphoretic. No cyanosis.   Psychiatric: She has a normal mood and affect. Her behavior is normal. Judgment and thought content normal.         ECG 12 Lead  Date/Time: 11/25/2019 1:25 PM  Performed by: Vilma Patterson APRN  Authorized by: Vilma Patterson APRN   Comparison: compared with previous ECG   Similar to previous ECG  Rhythm: atrial fibrillation  Rate: normal    Clinical impression: abnormal EKG            Current Outpatient Medications   Medication Sig Dispense Refill   • atorvastatin (LIPITOR) 40 MG tablet Take  by mouth daily.     • carvedilol (COREG) 25 MG tablet TAKE 1 TABLET BY MOUTH TWICE A DAY 90 tablet 2   • Cetirizine HCl (ZYRTEC ALLERGY) 10 MG capsule Take  by mouth Daily As Needed.     • ELIQUIS 5 MG tablet tablet TAKE 1 TABLET BY MOUTH EVERY 12 HOURS 60 tablet 11   • furosemide (LASIX) 40 MG tablet Take 1 tablet by mouth Daily. 30 tablet 0   • levothyroxine (SYNTHROID, LEVOTHROID) 25 MCG tablet Take 25 mcg by mouth Daily.     • omeprazole (prilOSEC) 10 MG capsule Take 10 mg by mouth.     • potassium chloride (K-DUR) 10 MEQ CR tablet Take 1 tablet by mouth Daily. 30 tablet 0   • sertraline (ZOLOFT) 50 MG tablet Take  by mouth daily.       No current facility-administered medications for this visit.      Assessment:       Diagnosis Plan   1. Persistent atrial fibrillation     2. Essential hypertension     3. Mixed hyperlipidemia          No orders of the defined types were " placed in this encounter.        Plan:   1.  79-year-old female with paroxysmal atrial fibrillation  Atrial Fibrillation and Atrial Flutter  Assessment  • The patient has permanent atrial fibrillation  • This is non-valvular in etiology  • The patient's CHADS2-VASc score is 2  • A [id number] score of 2 or more is considered a high risk for a thromboembolic event  • Apixaban prescribed    Plan  • Continue in atrial fibrillation with rate control  • Continue apixaban for antithrombotic therapy, bleeding issues discussed  • Continue beta blocker for rate control      still reluctant to have cardioversion so we will continue the same  2.Hyperlipidemia on target dose atorvastatin  3. Hypertension blood pressure appears stable today she has history of fluctuating blood pressure no sustained elevations  4. Suspected sleep apnea she has no desire to really be tested  5.Hypothyroidism on replacement therapy  6.history of premature ventricular contraction  7.  Diastolic dysfunction secondary to atrial fibrillation now back on diuretics she will have BMP today and if renal function stable continue Lasix and potassium otherwise consider resuming indapamide which she was previously on    Follow-up in 4 to 6 months call with questions or concerns.          It has been a pleasure to participate in this patient's care.      Thank you,  ESTEVAN Zafar      **I used Dragon to dictate this note:**

## 2019-11-25 NOTE — TELEPHONE ENCOUNTER
Spoke with patient GFR down a little bit.  Cut Lasix in half from 40 mg to 20 mg daily and continue 10 meq potassium daily repeat BMP 1 week.  She verbalized understanding

## 2019-12-02 ENCOUNTER — TELEPHONE (OUTPATIENT)
Dept: CARDIOLOGY | Facility: CLINIC | Age: 79
End: 2019-12-02

## 2019-12-02 ENCOUNTER — LAB (OUTPATIENT)
Dept: LAB | Facility: HOSPITAL | Age: 79
End: 2019-12-02

## 2019-12-02 DIAGNOSIS — Z51.81 ENCOUNTER FOR THERAPEUTIC DRUG LEVEL MONITORING: ICD-10-CM

## 2019-12-02 LAB
ANION GAP SERPL CALCULATED.3IONS-SCNC: 10.7 MMOL/L (ref 5–15)
BUN BLD-MCNC: 17 MG/DL (ref 8–23)
BUN/CREAT SERPL: 20.7 (ref 7–25)
CALCIUM SPEC-SCNC: 8.6 MG/DL (ref 8.6–10.5)
CHLORIDE SERPL-SCNC: 102 MMOL/L (ref 98–107)
CO2 SERPL-SCNC: 30.3 MMOL/L (ref 22–29)
CREAT BLD-MCNC: 0.82 MG/DL (ref 0.57–1)
GFR SERPL CREATININE-BSD FRML MDRD: 67 ML/MIN/1.73
GLUCOSE BLD-MCNC: 135 MG/DL (ref 65–99)
POTASSIUM BLD-SCNC: 4.1 MMOL/L (ref 3.5–5.2)
SODIUM BLD-SCNC: 143 MMOL/L (ref 136–145)

## 2019-12-02 PROCEDURE — 80048 BASIC METABOLIC PNL TOTAL CA: CPT

## 2019-12-02 PROCEDURE — 36415 COLL VENOUS BLD VENIPUNCTURE: CPT

## 2019-12-02 RX ORDER — FUROSEMIDE 20 MG/1
20 TABLET ORAL DAILY
Qty: 30 TABLET | Refills: 11
Start: 2019-12-02 | End: 2019-12-12 | Stop reason: SDUPTHER

## 2019-12-02 NOTE — TELEPHONE ENCOUNTER
Spoke with patient GFR back in normal range.  Potassium normal continue Lasix 20 mg daily with potassium 10 daily.  Call with questions or concerns otherwise see us in 6 months as scheduled

## 2019-12-12 RX ORDER — FUROSEMIDE 40 MG/1
TABLET ORAL
Qty: 30 TABLET | Refills: 5 | Status: SHIPPED | OUTPATIENT
Start: 2019-12-12 | End: 2019-12-13 | Stop reason: DRUGHIGH

## 2019-12-12 RX ORDER — POTASSIUM CHLORIDE 750 MG/1
TABLET, FILM COATED, EXTENDED RELEASE ORAL
Qty: 30 TABLET | Refills: 5 | Status: SHIPPED | OUTPATIENT
Start: 2019-12-12 | End: 2020-04-17

## 2019-12-13 RX ORDER — FUROSEMIDE 20 MG/1
20 TABLET ORAL DAILY
Qty: 30 TABLET | Refills: 5 | Status: SHIPPED | OUTPATIENT
Start: 2019-12-13 | End: 2020-05-29

## 2019-12-13 RX ORDER — FUROSEMIDE 20 MG/1
20 TABLET ORAL DAILY
COMMUNITY
End: 2019-12-13 | Stop reason: SDUPTHER

## 2020-04-17 RX ORDER — POTASSIUM CHLORIDE 750 MG/1
TABLET, FILM COATED, EXTENDED RELEASE ORAL
Qty: 90 TABLET | Refills: 0 | Status: SHIPPED | OUTPATIENT
Start: 2020-04-17 | End: 2020-07-13

## 2020-05-28 ENCOUNTER — TELEMEDICINE (OUTPATIENT)
Dept: CARDIOLOGY | Facility: CLINIC | Age: 80
End: 2020-05-28

## 2020-05-28 VITALS
DIASTOLIC BLOOD PRESSURE: 82 MMHG | WEIGHT: 195 LBS | HEIGHT: 68 IN | BODY MASS INDEX: 29.55 KG/M2 | SYSTOLIC BLOOD PRESSURE: 113 MMHG | HEART RATE: 96 BPM

## 2020-05-28 DIAGNOSIS — I48.19 PERSISTENT ATRIAL FIBRILLATION (HCC): Primary | ICD-10-CM

## 2020-05-28 DIAGNOSIS — I50.32 CHRONIC DIASTOLIC CONGESTIVE HEART FAILURE (HCC): ICD-10-CM

## 2020-05-28 DIAGNOSIS — I10 ESSENTIAL HYPERTENSION: ICD-10-CM

## 2020-05-28 DIAGNOSIS — E78.2 MIXED HYPERLIPIDEMIA: ICD-10-CM

## 2020-05-28 PROCEDURE — 99214 OFFICE O/P EST MOD 30 MIN: CPT | Performed by: INTERNAL MEDICINE

## 2020-05-28 RX ORDER — DIGOXIN 125 MCG
125 TABLET ORAL DAILY
Qty: 30 TABLET | Refills: 11 | Status: SHIPPED | OUTPATIENT
Start: 2020-05-28 | End: 2021-06-07 | Stop reason: SDUPTHER

## 2020-05-28 NOTE — PROGRESS NOTES
Date of Office Visit: 20  Encounter Provider: Guicho Pérez MD  Place of Service: [facility] CARDIOLOGY  Patient Name: Jordana Hdz  :1940  Referral Provider:No ref. provider found      Chief Complaint   Patient presents with   • Follow-up     History of Present Illness  This patient has consented to a telehealth visit via video. The visit was scheduled as a follow-up visit to comply with patient safety concerns in accordance with CDC recommendations.  All vitals recorded within this visit are reported by the patient.  I spent 25 minutes in total including but not limited to the 10 minutes spent in direct conversation with this patient.     The patient is a pleasant 80 yo who we saw in 2016 with palpitations it appeared that she had an episode of atrial flutter but that was only episode we discussed further treatment with ablation but she declined.  Had a negative workup at that time including an echocardiogram that was unremarkable.  She was to see us when necessary but then in 2018 developed increasing palpitations came back in the office this time was found to be in atrial flutter she had a repeat echocardiogram that showed normal left ventricular systolic function no significant valvular disease she was started on beta blocker carvedilol and eliquis.  Then felt she was having symptoms from the atrial fibrillation we sent her to the arrhythmia service they discussed with her rhythm control versus cardioversion she really did not want cardioversion so she wore a Holter monitor that showed normal rate control and was kept on that.    She presented to the emergency department on 2019 with worsening shortness of breath that began a couple days prior.  proBNP was within normal range.  Chest x-ray showed minimal left pleural effusion and basilar atelectasis.  She received IV Lasix 40 mg x 1 and was started on by mouth Lasix and potassium  replacement.  Just prior to that episode her PCP had stopped her diuretic.  States since then she is done well she denies any real shortness of breath, orthopnea, or PND.  She denies any chest pain or pressure.  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  No associated dizziness, lightheadedness, near-syncope or syncope.  No abrupt loss of vision, paralysis, paresthesia, dysarthria, blood in her stool or black tarry stools.      Hypertension   Associated symptoms include malaise/fatigue. Pertinent negatives include no blurred vision or headaches.         Past Medical History:   Diagnosis Date   • Acute congestive heart failure (CMS/HCC)    • Atrial fibrillation (CMS/HCC)    • Atrial flutter (CMS/HCC)    • Hyperlipidemia    • Hypertension          Past Surgical History:   Procedure Laterality Date   • CATARACT EXTRACTION     • TUBAL ABDOMINAL LIGATION           Current Outpatient Medications on File Prior to Visit   Medication Sig Dispense Refill   • atorvastatin (LIPITOR) 40 MG tablet Take  by mouth daily.     • carvedilol (COREG) 25 MG tablet TAKE 1 TABLET BY MOUTH TWICE A DAY 90 tablet 2   • Cetirizine HCl (ZYRTEC ALLERGY) 10 MG capsule Take  by mouth Daily As Needed.     • ELIQUIS 5 MG tablet tablet TAKE 1 TABLET BY MOUTH EVERY 12 HOURS 60 tablet 11   • furosemide (LASIX) 20 MG tablet Take 1 tablet by mouth Daily. This is the correct dose pt should be taking.  20 mg daily 30 tablet 5   • levothyroxine (SYNTHROID, LEVOTHROID) 25 MCG tablet Take 25 mcg by mouth Daily.     • omeprazole (prilOSEC) 10 MG capsule Take 20 mg by mouth.     • potassium chloride (K-DUR) 10 MEQ CR tablet TAKE 1 TABLET BY MOUTH EVERY DAY 90 tablet 0   • sertraline (ZOLOFT) 50 MG tablet Take  by mouth daily.       No current facility-administered medications on file prior to visit.          Social History     Socioeconomic History   • Marital  status:      Spouse name: Not on file   • Number of children: Not on file   • Years of education: Not on file   • Highest education level: Not on file   Tobacco Use   • Smoking status: Former Smoker     Packs/day: 1.00     Years: 15.00     Pack years: 15.00     Types: Cigarettes     Last attempt to quit:      Years since quittin.4   • Smokeless tobacco: Never Used   • Tobacco comment: caffeine 1-2 cups coffee daily   Substance and Sexual Activity   • Alcohol use: Yes     Comment: rarely   • Drug use: No   Lifestyle   • Physical activity:     Days per week: 0 days     Minutes per session: 0 min   • Stress: To some extent       Family History   Problem Relation Age of Onset   • Heart attack Father    • Stroke Father    • Hypertension Father    • Aneurysm Father         brain   • Atrial fibrillation Brother    • Diabetes Paternal Aunt          Review of Systems   Constitution: Positive for malaise/fatigue. Negative for decreased appetite, diaphoresis, fever, weight gain and weight loss.   HENT: Negative for congestion, hearing loss, nosebleeds and tinnitus.    Eyes: Negative for blurred vision, double vision, vision loss in left eye, vision loss in right eye and visual disturbance.   Cardiovascular:        As noted in HPI   Respiratory:        As noted HPI   Endocrine: Negative for cold intolerance and heat intolerance.   Hematologic/Lymphatic: Negative for bleeding problem. Does not bruise/bleed easily.   Skin: Negative for color change, flushing, itching and rash.   Musculoskeletal: Negative for arthritis, back pain, joint pain, joint swelling, muscle weakness and myalgias.   Gastrointestinal: Negative for bloating, abdominal pain, constipation, diarrhea, dysphagia, heartburn, hematemesis, hematochezia, melena, nausea and vomiting.   Genitourinary: Negative for bladder incontinence, dysuria, frequency, nocturia and urgency.   Neurological: Negative for dizziness, focal weakness, headaches,  "light-headedness, loss of balance, numbness, paresthesias, vertigo and weakness.   Psychiatric/Behavioral: Negative for depression, memory loss and substance abuse.       Procedures    Procedures        Objective:    /82 (BP Location: Left arm)   Pulse 96   Ht 172.7 cm (68\")   Wt 88.5 kg (195 lb)   BMI 29.65 kg/m²        Physical Exam  Physical Exam  Video call      Assessment:   1. 79 showed female with paroxysmal atrial flutter and atrial fibrillation. The patient's CHADS2-VASc score is 2.  It seems to me that she still symptomatic and her heart rate is up to the 130 range her blood pressures are on the low side.  We will add digoxin 0.125 mg p.o. today.  She will see our nurse practitioner in 1 month we will check dig level at that time and she will call if he has problems before then.  2.  Hyperlipidemia on atorvastatin.   3.  Hypertension blood pressure under adequate control.  If anything low side  4.  Heart failure preserved ventricular ejection fraction but in retrospect I think with this was just due to stopping the diuretic.  She never had symptoms before then.  But now stable.         Plan:         "

## 2020-05-29 RX ORDER — FUROSEMIDE 20 MG/1
20 TABLET ORAL DAILY
Qty: 90 TABLET | Refills: 1 | Status: SHIPPED | OUTPATIENT
Start: 2020-05-29 | End: 2020-08-16

## 2020-06-11 RX ORDER — CARVEDILOL 25 MG/1
TABLET ORAL
Qty: 180 TABLET | Refills: 2 | Status: SHIPPED | OUTPATIENT
Start: 2020-06-11 | End: 2020-08-16

## 2020-06-29 ENCOUNTER — OFFICE VISIT (OUTPATIENT)
Dept: CARDIOLOGY | Facility: CLINIC | Age: 80
End: 2020-06-29

## 2020-06-29 VITALS
WEIGHT: 195 LBS | HEIGHT: 68 IN | DIASTOLIC BLOOD PRESSURE: 75 MMHG | HEART RATE: 77 BPM | SYSTOLIC BLOOD PRESSURE: 105 MMHG | BODY MASS INDEX: 29.55 KG/M2

## 2020-06-29 DIAGNOSIS — Z51.81 ENCOUNTER FOR THERAPEUTIC DRUG LEVEL MONITORING: Primary | ICD-10-CM

## 2020-06-29 DIAGNOSIS — E78.2 MIXED HYPERLIPIDEMIA: ICD-10-CM

## 2020-06-29 DIAGNOSIS — I48.19 PERSISTENT ATRIAL FIBRILLATION (HCC): ICD-10-CM

## 2020-06-29 DIAGNOSIS — I50.32 CHRONIC DIASTOLIC CONGESTIVE HEART FAILURE (HCC): ICD-10-CM

## 2020-06-29 DIAGNOSIS — I10 ESSENTIAL HYPERTENSION: ICD-10-CM

## 2020-06-29 PROCEDURE — 99214 OFFICE O/P EST MOD 30 MIN: CPT | Performed by: NURSE PRACTITIONER

## 2020-06-29 RX ORDER — OMEPRAZOLE 20 MG/1
20 CAPSULE, DELAYED RELEASE ORAL DAILY
COMMUNITY

## 2020-06-29 NOTE — PROGRESS NOTES
Date of Office Visit: 20  Encounter Provider: ESTEVAN Zafar  Place of Service: [facility] CARDIOLOGY  Patient Name: Jordana Hdz  :1940    Chief Complaint   Patient presents with   • Atrial Fibrillation   • Follow-up   :     HPI: Jordana Hdz is a 79 y.o. female  with history of hypertension, hyperlipidemia, and atrial fibrillation/flutter. She is followed by Dr. Pérez and I will see her in follow up today      She has a history of palpitation and had 1 episode of atrial flutter.  Ablation was discussed but it was declined.  She also had PVCs.  She was last seen in the office in 2016.  She had an occasional skipped beat sensation which was rare but no fluttering sensation.     She had an ER visit ion 2017 with complaint of dizziness and a near syncope episdoe which had resolved upon arrival. She also reported the week prior having anepisode of rapid palpitation which resolved quickly. TSH was elevated, Cr0.68, GFR84. EKG NSR.  In 2018 she presented and was in atrial fibrillation with a heart rate of 115.  Carvedilol was increased to 12.5 mg twice daily and she was to start Eliquis.  At that time she was taking an antibiotic for salivary gland infection.  At her 1 month follow-up she continued to have symptoms of increased shortness of breath with activity but no palpitations.  Her heart rate was elevated at 112  so we increase carvedilol to 25 mg twice daily.  She was evaluated by Dr. Smalls and discuss rate versus rhythm control strategy.  24-hour Holter completed 10/2/2018 showed average heart rate 92.  She did not want cardioversion so rate control and anticoagulation was continued.     She presented to the emergency department on 2019 with worsening shortness of breath that began a couple days prior.  proBNP was within normal range.  Chest x-ray showed minimal left pleural effusion and basilar atelectasis.  She received IV  "Lasix 40 mg x 1 and was started on by mouth Lasix and potassium replacement. Indapamide had been stopped approximately one month prior.follow up BMP was stable and she was continued on furosemide.     Then in 05/2020, she reported heart rates as high as 130 bpm but blood pressure was low and she was started on digoxin 125 mcg daily.     We visit today via telephone. She reports BP as low as 90 systolic. Heart rate has been 70 at rest instead of 90 s as it was prior to digoxin. She has more energy. She has no real dizziness or lightheadedness. No chest pain or shortness of breath. She takes digoxin in the afternoon. She has indigestion and releases gas then it goes away. It does not associate with exertion. She has no bleeding issues like blood in the urine or stool.      No Known Allergies    Past Medical History:   Diagnosis Date   • Acute congestive heart failure (CMS/HCC)    • Atrial fibrillation (CMS/HCC)    • Atrial flutter (CMS/HCC)    • Hyperlipidemia    • Hypertension        Past Surgical History:   Procedure Laterality Date   • CATARACT EXTRACTION     • TUBAL ABDOMINAL LIGATION           Family and social history reviewed.     ROS  All other systems were reviewed and are negative          Objective:     Vitals:    06/29/20 0848   BP: 105/75   BP Location: Left arm   Patient Position: Sitting   Pulse: 77   Weight: 88.5 kg (195 lb)   Height: 172.7 cm (68\")     Body mass index is 29.65 kg/m².    PHYSICAL EXAM:  Physical Exam  Unable to assess  Procedures  Unable to assess  Current Outpatient Medications   Medication Sig Dispense Refill   • atorvastatin (LIPITOR) 40 MG tablet Take  by mouth daily.     • carvedilol (COREG) 25 MG tablet TAKE 1 TABLET BY MOUTH TWICE A  tablet 2   • Cetirizine HCl (ZYRTEC ALLERGY) 10 MG capsule Take  by mouth Daily As Needed.     • digoxin (LANOXIN) 125 MCG tablet Take 1 tablet by mouth Daily. 30 tablet 11   • ELIQUIS 5 MG tablet tablet TAKE 1 TABLET BY MOUTH EVERY 12 HOURS " 60 tablet 11   • furosemide (LASIX) 20 MG tablet TAKE 1 TABLET BY MOUTH DAILY. THIS IS THE CORRECT DOSE PT SHOULD BE TAKING. 20 MG DAILY 90 tablet 1   • levothyroxine (SYNTHROID, LEVOTHROID) 25 MCG tablet Take 25 mcg by mouth Daily.     • omeprazole (priLOSEC) 20 MG capsule Take 20 mg by mouth Daily.     • potassium chloride (K-DUR) 10 MEQ CR tablet TAKE 1 TABLET BY MOUTH EVERY DAY 90 tablet 0   • sertraline (ZOLOFT) 50 MG tablet Take  by mouth daily.       No current facility-administered medications for this visit.      Assessment:       Diagnosis Plan   1. Encounter for therapeutic drug level monitoring  Basic Metabolic Panel   2. Persistent atrial fibrillation (CMS/HCC)     3. Essential hypertension     4. Mixed hyperlipidemia     5. Chronic diastolic congestive heart failure (CMS/HCC)          Orders Placed This Encounter   Procedures   • Basic Metabolic Panel     Standing Status:   Future     Standing Expiration Date:   6/30/2021         Plan:    1.  79-year-old female with paroxysmal atrial fibrillation. CHADS2-VASc score is 2 anticoagulated with Eliquis. Digoxin added last month for elevated heart rate. She is to return for BMP and digoxin level and was advised to hold digoxin until after the lab is drawn to not skew the value.  2.Hyperlipidemia on target dose atorvastatin  3. Hypertension blood pressure appears stable today she has history of fluctuating blood pressure no sustained elevations  4. Suspected sleep apnea she has no desire to really be tested  5.Hypothyroidism on replacement therapy  6.history of premature ventricular contraction  7.  Heart failure preserved ventricular ejection fraction. In retrospect felt to be due to stopping the diuretic. Now stable  8. GERD on therapy    This patient has consented to a telehealth visit via telephone. The visit was scheduled as a telephone visit to comply with patient safety concerns in accordance with CDC recommendations.  All vitals recorded within this  visit are reported by the patient.  I spent  25  minutes in total including but not limited to the 14 minutes spent in direct conversation with this patient.     Follow up in 6 months if dig level ok and BMP stable. Call with questions or concerns.       It has been a pleasure to participate in this patient's care.      Thank you,  ESTEVAN Zafar      **I used Dragon to dictate this note:**

## 2020-06-30 ENCOUNTER — LAB (OUTPATIENT)
Dept: LAB | Facility: HOSPITAL | Age: 80
End: 2020-06-30

## 2020-06-30 ENCOUNTER — TELEPHONE (OUTPATIENT)
Dept: CARDIOLOGY | Facility: CLINIC | Age: 80
End: 2020-06-30

## 2020-06-30 DIAGNOSIS — Z51.81 ENCOUNTER FOR THERAPEUTIC DRUG LEVEL MONITORING: ICD-10-CM

## 2020-06-30 DIAGNOSIS — I48.19 PERSISTENT ATRIAL FIBRILLATION (HCC): ICD-10-CM

## 2020-06-30 LAB
ANION GAP SERPL CALCULATED.3IONS-SCNC: 8.7 MMOL/L (ref 5–15)
BUN SERPL-MCNC: 12 MG/DL (ref 8–23)
BUN/CREAT SERPL: 16.4 (ref 7–25)
CALCIUM SPEC-SCNC: 8.3 MG/DL (ref 8.6–10.5)
CHLORIDE SERPL-SCNC: 101 MMOL/L (ref 98–107)
CO2 SERPL-SCNC: 29.3 MMOL/L (ref 22–29)
CREAT SERPL-MCNC: 0.73 MG/DL (ref 0.57–1)
DIGOXIN SERPL-MCNC: 0.7 NG/ML (ref 0.6–1.2)
GFR SERPL CREATININE-BSD FRML MDRD: 77 ML/MIN/1.73
GLUCOSE SERPL-MCNC: 128 MG/DL (ref 65–99)
POTASSIUM SERPL-SCNC: 3.8 MMOL/L (ref 3.5–5.2)
SODIUM SERPL-SCNC: 139 MMOL/L (ref 136–145)

## 2020-06-30 PROCEDURE — 80048 BASIC METABOLIC PNL TOTAL CA: CPT

## 2020-06-30 PROCEDURE — 36415 COLL VENOUS BLD VENIPUNCTURE: CPT

## 2020-06-30 PROCEDURE — 80162 ASSAY OF DIGOXIN TOTAL: CPT

## 2020-07-13 RX ORDER — POTASSIUM CHLORIDE 750 MG/1
TABLET, FILM COATED, EXTENDED RELEASE ORAL
Qty: 90 TABLET | Refills: 0 | Status: SHIPPED | OUTPATIENT
Start: 2020-07-13 | End: 2020-08-16 | Stop reason: ALTCHOICE

## 2020-07-16 ENCOUNTER — TELEPHONE (OUTPATIENT)
Dept: CARDIOLOGY | Facility: CLINIC | Age: 80
End: 2020-07-16

## 2020-07-16 NOTE — TELEPHONE ENCOUNTER
Pt called c/o nausea and diarrhea for the past couple of weeks.  She was started on Digoxin 0.125 mg dialy on 5/28/20. She is wondering if the Digoxin or maybe the Potassium could be causing her symptoms.  According to the telephone note from 6/30/20, pt's dig level and BMP results were normal.  Please advise/[facility]    # [phone]

## 2020-07-16 NOTE — TELEPHONE ENCOUNTER
Called the potassium she will will that see how she feels.  Unlikely the digoxin just normal dig level.MJI

## 2020-07-27 NOTE — TELEPHONE ENCOUNTER
Patient called and stated that last week she was taken off of potassium due to her having diarrhea and nausea. She stated that the symptoms have not went away.     She would like to know if she could stop her digoxin?    Patient can be reached at [phone] thanks

## 2020-08-14 RX ORDER — APIXABAN 5 MG/1
TABLET, FILM COATED ORAL
Qty: 60 TABLET | Refills: 5 | Status: SHIPPED | OUTPATIENT
Start: 2020-08-14 | End: 2020-08-16

## 2020-08-16 ENCOUNTER — HOSPITAL ENCOUNTER (INPATIENT)
Facility: HOSPITAL | Age: 80
LOS: 4 days | Discharge: HOME | End: 2020-08-20
Attending: EMERGENCY MEDICINE | Admitting: HOSPITALIST

## 2020-08-16 ENCOUNTER — APPOINTMENT (OUTPATIENT)
Dept: GENERAL RADIOLOGY | Facility: HOSPITAL | Age: 80
End: 2020-08-16

## 2020-08-16 DIAGNOSIS — Z79.01 CHRONIC ANTICOAGULATION: ICD-10-CM

## 2020-08-16 DIAGNOSIS — I48.20 CHRONIC ATRIAL FIBRILLATION (HCC): ICD-10-CM

## 2020-08-16 DIAGNOSIS — D62 ACUTE BLOOD LOSS ANEMIA: ICD-10-CM

## 2020-08-16 DIAGNOSIS — J90 PLEURAL EFFUSION ON LEFT: ICD-10-CM

## 2020-08-16 DIAGNOSIS — R06.09 DYSPNEA ON EXERTION: Primary | ICD-10-CM

## 2020-08-16 DIAGNOSIS — E83.42 HYPOMAGNESEMIA: ICD-10-CM

## 2020-08-16 LAB
ABO GROUP BLD: NORMAL
ALBUMIN SERPL-MCNC: 3.4 G/DL (ref 3.5–5.2)
ALBUMIN/GLOB SERPL: 1 G/DL
ALP SERPL-CCNC: 101 U/L (ref 39–117)
ALT SERPL W P-5'-P-CCNC: 16 U/L (ref 1–33)
ANION GAP SERPL CALCULATED.3IONS-SCNC: 9.6 MMOL/L (ref 5–15)
AST SERPL-CCNC: 19 U/L (ref 1–32)
B PARAPERT DNA SPEC QL NAA+PROBE: NOT DETECTED
B PERT DNA SPEC QL NAA+PROBE: NOT DETECTED
BASOPHILS # BLD AUTO: 0.06 10*3/MM3 (ref 0–0.2)
BASOPHILS NFR BLD AUTO: 0.9 % (ref 0–1.5)
BILIRUB SERPL-MCNC: 0.6 MG/DL (ref 0–1.2)
BILIRUB UR QL STRIP: NEGATIVE
BLD GP AB SCN SERPL QL: NEGATIVE
BUN SERPL-MCNC: 8 MG/DL (ref 8–23)
BUN/CREAT SERPL: 12.3 (ref 7–25)
C PNEUM DNA NPH QL NAA+NON-PROBE: NOT DETECTED
CALCIUM SPEC-SCNC: 8.2 MG/DL (ref 8.6–10.5)
CHLORIDE SERPL-SCNC: 102 MMOL/L (ref 98–107)
CLARITY UR: CLEAR
CO2 SERPL-SCNC: 27.4 MMOL/L (ref 22–29)
COLOR UR: YELLOW
CREAT SERPL-MCNC: 0.65 MG/DL (ref 0.57–1)
D-LACTATE SERPL-SCNC: 1.2 MMOL/L (ref 0.5–2)
DEPRECATED RDW RBC AUTO: 42 FL (ref 37–54)
DIGOXIN SERPL-MCNC: 1.1 NG/ML (ref 0.6–1.2)
EOSINOPHIL # BLD AUTO: 0.17 10*3/MM3 (ref 0–0.4)
EOSINOPHIL NFR BLD AUTO: 2.5 % (ref 0.3–6.2)
ERYTHROCYTE [DISTWIDTH] IN BLOOD BY AUTOMATED COUNT: 13.1 % (ref 12.3–15.4)
FLUAV H1 2009 PAND RNA NPH QL NAA+PROBE: NOT DETECTED
FLUAV H1 HA GENE NPH QL NAA+PROBE: NOT DETECTED
FLUAV H3 RNA NPH QL NAA+PROBE: NOT DETECTED
FLUAV SUBTYP SPEC NAA+PROBE: NOT DETECTED
FLUBV RNA ISLT QL NAA+PROBE: NOT DETECTED
GFR SERPL CREATININE-BSD FRML MDRD: 88 ML/MIN/1.73
GLOBULIN UR ELPH-MCNC: 3.3 GM/DL
GLUCOSE SERPL-MCNC: 142 MG/DL (ref 65–99)
GLUCOSE UR STRIP-MCNC: NEGATIVE MG/DL
HADV DNA SPEC NAA+PROBE: NOT DETECTED
HCOV 229E RNA SPEC QL NAA+PROBE: NOT DETECTED
HCOV HKU1 RNA SPEC QL NAA+PROBE: NOT DETECTED
HCOV NL63 RNA SPEC QL NAA+PROBE: NOT DETECTED
HCOV OC43 RNA SPEC QL NAA+PROBE: NOT DETECTED
HCT VFR BLD AUTO: 31.2 % (ref 34–46.6)
HGB BLD-MCNC: 10.3 G/DL (ref 12–15.9)
HGB UR QL STRIP.AUTO: NEGATIVE
HMPV RNA NPH QL NAA+NON-PROBE: NOT DETECTED
HPIV1 RNA SPEC QL NAA+PROBE: NOT DETECTED
HPIV2 RNA SPEC QL NAA+PROBE: NOT DETECTED
HPIV3 RNA NPH QL NAA+PROBE: NOT DETECTED
HPIV4 P GENE NPH QL NAA+PROBE: NOT DETECTED
IMM GRANULOCYTES # BLD AUTO: 0.03 10*3/MM3 (ref 0–0.05)
IMM GRANULOCYTES NFR BLD AUTO: 0.4 % (ref 0–0.5)
INR PPP: 1.43 (ref 0.9–1.1)
KETONES UR QL STRIP: NEGATIVE
LEUKOCYTE ESTERASE UR QL STRIP.AUTO: NEGATIVE
LYMPHOCYTES # BLD AUTO: 1.33 10*3/MM3 (ref 0.7–3.1)
LYMPHOCYTES NFR BLD AUTO: 19.8 % (ref 19.6–45.3)
M PNEUMO IGG SER IA-ACNC: NOT DETECTED
MAGNESIUM SERPL-MCNC: 1.3 MG/DL (ref 1.6–2.4)
MCH RBC QN AUTO: 29.1 PG (ref 26.6–33)
MCHC RBC AUTO-ENTMCNC: 33 G/DL (ref 31.5–35.7)
MCV RBC AUTO: 88.1 FL (ref 79–97)
MONOCYTES # BLD AUTO: 0.56 10*3/MM3 (ref 0.1–0.9)
MONOCYTES NFR BLD AUTO: 8.3 % (ref 5–12)
NEUTROPHILS NFR BLD AUTO: 4.58 10*3/MM3 (ref 1.7–7)
NEUTROPHILS NFR BLD AUTO: 68.1 % (ref 42.7–76)
NITRITE UR QL STRIP: NEGATIVE
NRBC BLD AUTO-RTO: 0 /100 WBC (ref 0–0.2)
NT-PROBNP SERPL-MCNC: 1631 PG/ML (ref 0–1800)
PH UR STRIP.AUTO: 7.5 [PH] (ref 5–8)
PLATELET # BLD AUTO: 260 10*3/MM3 (ref 140–450)
PMV BLD AUTO: 11.8 FL (ref 6–12)
POTASSIUM SERPL-SCNC: 3.9 MMOL/L (ref 3.5–5.2)
PROCALCITONIN SERPL-MCNC: 0.06 NG/ML (ref 0–0.25)
PROT SERPL-MCNC: 6.7 G/DL (ref 6–8.5)
PROT UR QL STRIP: NEGATIVE
PROTHROMBIN TIME: 17.1 SECONDS (ref 11.7–14.2)
RBC # BLD AUTO: 3.54 10*6/MM3 (ref 3.77–5.28)
RH BLD: POSITIVE
RHINOVIRUS RNA SPEC NAA+PROBE: NOT DETECTED
RSV RNA NPH QL NAA+NON-PROBE: NOT DETECTED
SARS-COV-2 RNA PNL SPEC NAA+PROBE: NOT DETECTED
SODIUM SERPL-SCNC: 139 MMOL/L (ref 136–145)
SP GR UR STRIP: 1.01 (ref 1–1.03)
T&S EXPIRATION DATE: NORMAL
TROPONIN T SERPL-MCNC: <0.01 NG/ML (ref 0–0.03)
UROBILINOGEN UR QL STRIP: NORMAL
WBC # BLD AUTO: 6.73 10*3/MM3 (ref 3.4–10.8)

## 2020-08-16 PROCEDURE — 86900 BLOOD TYPING SEROLOGIC ABO: CPT | Performed by: EMERGENCY MEDICINE

## 2020-08-16 PROCEDURE — 85025 COMPLETE CBC W/AUTO DIFF WBC: CPT | Performed by: EMERGENCY MEDICINE

## 2020-08-16 PROCEDURE — 83880 ASSAY OF NATRIURETIC PEPTIDE: CPT | Performed by: EMERGENCY MEDICINE

## 2020-08-16 PROCEDURE — 86901 BLOOD TYPING SEROLOGIC RH(D): CPT | Performed by: EMERGENCY MEDICINE

## 2020-08-16 PROCEDURE — 25010000002 MAGNESIUM SULFATE 2 GM/50ML SOLUTION: Performed by: EMERGENCY MEDICINE

## 2020-08-16 PROCEDURE — 80162 ASSAY OF DIGOXIN TOTAL: CPT | Performed by: EMERGENCY MEDICINE

## 2020-08-16 PROCEDURE — 80053 COMPREHEN METABOLIC PANEL: CPT | Performed by: EMERGENCY MEDICINE

## 2020-08-16 PROCEDURE — 93005 ELECTROCARDIOGRAM TRACING: CPT | Performed by: EMERGENCY MEDICINE

## 2020-08-16 PROCEDURE — 71045 X-RAY EXAM CHEST 1 VIEW: CPT

## 2020-08-16 PROCEDURE — 0202U NFCT DS 22 TRGT SARS-COV-2: CPT | Performed by: EMERGENCY MEDICINE

## 2020-08-16 PROCEDURE — 85610 PROTHROMBIN TIME: CPT | Performed by: EMERGENCY MEDICINE

## 2020-08-16 PROCEDURE — 93010 ELECTROCARDIOGRAM REPORT: CPT | Performed by: INTERNAL MEDICINE

## 2020-08-16 PROCEDURE — 25010000002 FUROSEMIDE PER 20 MG: Performed by: EMERGENCY MEDICINE

## 2020-08-16 PROCEDURE — 83605 ASSAY OF LACTIC ACID: CPT | Performed by: EMERGENCY MEDICINE

## 2020-08-16 PROCEDURE — 84145 PROCALCITONIN (PCT): CPT | Performed by: EMERGENCY MEDICINE

## 2020-08-16 PROCEDURE — 84484 ASSAY OF TROPONIN QUANT: CPT | Performed by: EMERGENCY MEDICINE

## 2020-08-16 PROCEDURE — 87040 BLOOD CULTURE FOR BACTERIA: CPT | Performed by: EMERGENCY MEDICINE

## 2020-08-16 PROCEDURE — 83735 ASSAY OF MAGNESIUM: CPT | Performed by: EMERGENCY MEDICINE

## 2020-08-16 PROCEDURE — 81003 URINALYSIS AUTO W/O SCOPE: CPT | Performed by: EMERGENCY MEDICINE

## 2020-08-16 PROCEDURE — 99284 EMERGENCY DEPT VISIT MOD MDM: CPT

## 2020-08-16 PROCEDURE — 86850 RBC ANTIBODY SCREEN: CPT | Performed by: EMERGENCY MEDICINE

## 2020-08-16 RX ORDER — CETIRIZINE HYDROCHLORIDE 10 MG/1
10 TABLET ORAL DAILY PRN
COMMUNITY

## 2020-08-16 RX ORDER — ACETAMINOPHEN 650 MG/1
650 SUPPOSITORY RECTAL EVERY 4 HOURS PRN
Status: DISCONTINUED | OUTPATIENT
Start: 2020-08-16 | End: 2020-08-20 | Stop reason: HOSPADM

## 2020-08-16 RX ORDER — ACETAMINOPHEN 160 MG/5ML
650 SOLUTION ORAL EVERY 4 HOURS PRN
Status: DISCONTINUED | OUTPATIENT
Start: 2020-08-16 | End: 2020-08-20 | Stop reason: HOSPADM

## 2020-08-16 RX ORDER — LEVOTHYROXINE SODIUM 0.03 MG/1
25 TABLET ORAL DAILY
Status: DISCONTINUED | OUTPATIENT
Start: 2020-08-17 | End: 2020-08-20 | Stop reason: HOSPADM

## 2020-08-16 RX ORDER — MAGNESIUM SULFATE HEPTAHYDRATE 40 MG/ML
2 INJECTION, SOLUTION INTRAVENOUS ONCE
Status: COMPLETED | OUTPATIENT
Start: 2020-08-16 | End: 2020-08-16

## 2020-08-16 RX ORDER — CARVEDILOL 25 MG/1
25 TABLET ORAL 2 TIMES DAILY WITH MEALS
Status: DISCONTINUED | OUTPATIENT
Start: 2020-08-17 | End: 2020-08-20 | Stop reason: HOSPADM

## 2020-08-16 RX ORDER — FUROSEMIDE 20 MG/1
20 TABLET ORAL DAILY
COMMUNITY
End: 2020-08-20 | Stop reason: HOSPADM

## 2020-08-16 RX ORDER — ACETAMINOPHEN 325 MG/1
650 TABLET ORAL EVERY 4 HOURS PRN
Status: DISCONTINUED | OUTPATIENT
Start: 2020-08-16 | End: 2020-08-20 | Stop reason: HOSPADM

## 2020-08-16 RX ORDER — PANTOPRAZOLE SODIUM 40 MG/10ML
40 INJECTION, POWDER, LYOPHILIZED, FOR SOLUTION INTRAVENOUS ONCE
Status: COMPLETED | OUTPATIENT
Start: 2020-08-16 | End: 2020-08-16

## 2020-08-16 RX ORDER — NITROGLYCERIN 0.4 MG/1
0.4 TABLET SUBLINGUAL
Status: DISCONTINUED | OUTPATIENT
Start: 2020-08-16 | End: 2020-08-20 | Stop reason: HOSPADM

## 2020-08-16 RX ORDER — ATORVASTATIN CALCIUM 20 MG/1
40 TABLET, FILM COATED ORAL DAILY
Status: DISCONTINUED | OUTPATIENT
Start: 2020-08-17 | End: 2020-08-20 | Stop reason: HOSPADM

## 2020-08-16 RX ORDER — DIGOXIN 125 MCG
125 TABLET ORAL DAILY
Status: DISCONTINUED | OUTPATIENT
Start: 2020-08-17 | End: 2020-08-20 | Stop reason: HOSPADM

## 2020-08-16 RX ORDER — SODIUM CHLORIDE 0.9 % (FLUSH) 0.9 %
10 SYRINGE (ML) INJECTION AS NEEDED
Status: DISCONTINUED | OUTPATIENT
Start: 2020-08-16 | End: 2020-08-20 | Stop reason: HOSPADM

## 2020-08-16 RX ORDER — FUROSEMIDE 10 MG/ML
40 INJECTION INTRAMUSCULAR; INTRAVENOUS ONCE
Status: COMPLETED | OUTPATIENT
Start: 2020-08-16 | End: 2020-08-16

## 2020-08-16 RX ORDER — SODIUM CHLORIDE 0.9 % (FLUSH) 0.9 %
10 SYRINGE (ML) INJECTION EVERY 12 HOURS SCHEDULED
Status: DISCONTINUED | OUTPATIENT
Start: 2020-08-16 | End: 2020-08-20 | Stop reason: HOSPADM

## 2020-08-16 RX ORDER — CARVEDILOL 25 MG/1
25 TABLET ORAL 2 TIMES DAILY WITH MEALS
COMMUNITY
End: 2021-03-22 | Stop reason: SDUPTHER

## 2020-08-16 RX ORDER — ONDANSETRON 2 MG/ML
4 INJECTION INTRAMUSCULAR; INTRAVENOUS EVERY 6 HOURS PRN
Status: DISCONTINUED | OUTPATIENT
Start: 2020-08-16 | End: 2020-08-20 | Stop reason: HOSPADM

## 2020-08-16 RX ORDER — PANTOPRAZOLE SODIUM 40 MG/1
40 TABLET, DELAYED RELEASE ORAL EVERY MORNING
Status: DISCONTINUED | OUTPATIENT
Start: 2020-08-17 | End: 2020-08-20 | Stop reason: HOSPADM

## 2020-08-16 RX ADMIN — PANTOPRAZOLE SODIUM 40 MG: 40 INJECTION, POWDER, FOR SOLUTION INTRAVENOUS at 19:34

## 2020-08-16 RX ADMIN — FUROSEMIDE 40 MG: 20 INJECTION, SOLUTION INTRAMUSCULAR; INTRAVENOUS at 19:49

## 2020-08-16 RX ADMIN — SODIUM CHLORIDE, PRESERVATIVE FREE 10 ML: 5 INJECTION INTRAVENOUS at 22:38

## 2020-08-16 RX ADMIN — MAGNESIUM SULFATE HEPTAHYDRATE 2 G: 40 INJECTION, SOLUTION INTRAVENOUS at 19:54

## 2020-08-16 NOTE — ED PROVIDER NOTES
EMERGENCY DEPARTMENT ENCOUNTER    Room Number:  36/36  Date of encounter:  8/16/2020  PCP: Olive Claire MD  Historian: Patient    Patient was placed in face mask during triage process. Patient was wearing facemask when I entered the room and throughout our encounter. I wore full protective equipment throughout this patient encounter including a face mask, eye protection, and gloves. Hand hygiene was performed before donning protective equipment and again following doffing of PPE after leaving the room.    HPI:  Chief Complaint: Dyspnea  A complete HPI/ROS/PMH/PSH/SH/FH are unobtainable due to: N/A   Context: Jordana Hdz is a 79 y.o. female who presents to the ED c/o progressive dyspnea over approximately 2 weeks.  Patient has reported orthopnea with significant dyspnea on exertion.  Denies any chest discomfort.  No palpitations appreciated.  Patient's previous nausea and GI upset seem significantly improved over this period of time.  Patient resumed her digoxin approximately 2 weeks ago after 6 days off.  No reported cough, fever, vomiting.  Patient does have frequent loose stools that are nonbloody and nonblack.  No abdominal pain at this time.  No syncopal episodes or near syncopal episodes reported.  Patient weighs daily and has not noted any significant increase in weight.    MEDICAL HISTORY REVIEW  History of A. Fib    TTE 7/31/2018  Interpretation Summary     · Left ventricular systolic function is normal. Calculated EF = 61%.  · Normal right ventricular cavity size and systolic function noted.  · Left atrial cavity size is moderately dilated.  · Right atrial cavity size is moderately dilated.  · Calculated right ventricular systolic pressure from tricuspid regurgitation is 26 mmHg.  · There is no evidence of pericardial effusion.        Interpretation Summary     · Left ventricular systolic function is normal. Calculated EF = 61%.  · Normal right ventricular cavity size and systolic function  noted.  · Left atrial cavity size is moderately dilated.  · Right atrial cavity size is moderately dilated.  · Calculated right ventricular systolic pressure from tricuspid regurgitation is 26 mmHg.  · There is no evidence of pericardial effusion.            PAST MEDICAL HISTORY  Active Ambulatory Problems     Diagnosis Date Noted   • No Active Ambulatory Problems     Resolved Ambulatory Problems     Diagnosis Date Noted   • No Resolved Ambulatory Problems     Past Medical History:   Diagnosis Date   • Acute congestive heart failure (CMS/HCC)    • Atrial fibrillation (CMS/HCC)    • Atrial flutter (CMS/HCC)    • Disease of thyroid gland    • GERD (gastroesophageal reflux disease)    • Hyperlipidemia    • Hypertension          PAST SURGICAL HISTORY  Past Surgical History:   Procedure Laterality Date   • CATARACT EXTRACTION     • TUBAL ABDOMINAL LIGATION           FAMILY HISTORY  Family History   Problem Relation Age of Onset   • Heart attack Father    • Stroke Father    • Hypertension Father    • Aneurysm Father         brain   • Heart disease Father    • Diabetes Paternal Aunt          SOCIAL HISTORY  Social History     Socioeconomic History   • Marital status:      Spouse name: Not on file   • Number of children: Not on file   • Years of education: Not on file   • Highest education level: Not on file   Tobacco Use   • Smoking status: Former Smoker     Packs/day: 1.00     Years: 15.00     Pack years: 15.00     Types: Cigarettes     Last attempt to quit:      Years since quittin.6   • Smokeless tobacco: Never Used   • Tobacco comment: caffeine 1-2 cups coffee daily   Substance and Sexual Activity   • Alcohol use: Yes     Comment: rarely   • Drug use: No   • Sexual activity: Defer   Lifestyle   • Physical activity:     Days per week: 0 days     Minutes per session: 0 min   • Stress: To some extent         ALLERGIES  Patient has no known allergies.        REVIEW OF SYSTEMS  Review of Systems     All  systems reviewed and negative except for those discussed in HPI.       PHYSICAL EXAM    I have reviewed the triage vital signs and nursing notes.    ED Triage Vitals [08/16/20 1604]   Temp Heart Rate Resp BP SpO2   99.5 °F (37.5 °C) 100 20 -- 92 %      Temp src Heart Rate Source Patient Position BP Location FiO2 (%)   Tympanic Monitor -- -- --       Physical Exam    Physical Exam   Constitutional: No distress but mild tachypnea noted.  Not overtly toxic appearing  HENT:  Head: Normocephalic and atraumatic.   Oropharynx: Mucous membranes are moist.   Eyes: No scleral icterus. No conjunctival pallor.  Neck: Painless range of motion noted. Neck supple.   Cardiovascular: Normal rate, regular rhythm and intact distal pulses.  Pulmonary/Chest: Mild tachypnea.  No significant increased work of breathing.  Crackles left base.  Otherwise clear.  Abdominal: Soft. There is no tenderness. There is no rebound and no guarding.   Musculoskeletal: Moves all extremities equally. There is no pedal edema or calf tenderness.   Neurological: Alert.  Baseline strength and sensation noted.   Skin: Skin is pink, warm, and dry. No pallor.   Psychiatric: Mood and affect normal.   Nursing note and vitals reviewed.    LAB RESULTS  Recent Results (from the past 24 hour(s))   Respiratory Panel PCR w/COVID-19(SARS-CoV-2) [facility] In-House, NP Swab in UTM/VTM, 3-4 HR TAT - Swab, Nasopharynx    Collection Time: 08/16/20  4:56 PM   Result Value Ref Range    ADENOVIRUS, PCR Not Detected Not Detected    Coronavirus 229E Not Detected Not Detected    Coronavirus HKU1 Not Detected Not Detected    Coronavirus NL63 Not Detected Not Detected    Coronavirus OC43 Not Detected Not Detected    COVID19 Not Detected Not Detected - Ref. Range    Human Metapneumovirus Not Detected Not Detected    Human Rhinovirus/Enterovirus Not Detected Not Detected    Influenza A PCR Not Detected Not Detected    Influenza A H1 Not Detected Not Detected    Influenza A H1  2009 PCR Not Detected Not Detected    Influenza A H3 Not Detected Not Detected    Influenza B PCR Not Detected Not Detected    Parainfluenza Virus 1 Not Detected Not Detected    Parainfluenza Virus 2 Not Detected Not Detected    Parainfluenza Virus 3 Not Detected Not Detected    Parainfluenza Virus 4 Not Detected Not Detected    RSV, PCR Not Detected Not Detected    Bordetella pertussis pcr Not Detected Not Detected    Bordetella parapertussis PCR Not Detected Not Detected    Chlamydophila pneumoniae PCR Not Detected Not Detected    Mycoplasma pneumo by PCR Not Detected Not Detected   Comprehensive Metabolic Panel    Collection Time: 08/16/20  4:57 PM   Result Value Ref Range    Glucose 142 (H) 65 - 99 mg/dL    BUN 8 8 - 23 mg/dL    Creatinine 0.65 0.57 - 1.00 mg/dL    Sodium 139 136 - 145 mmol/L    Potassium 3.9 3.5 - 5.2 mmol/L    Chloride 102 98 - 107 mmol/L    CO2 27.4 22.0 - 29.0 mmol/L    Calcium 8.2 (L) 8.6 - 10.5 mg/dL    Total Protein 6.7 6.0 - 8.5 g/dL    Albumin 3.40 (L) 3.50 - 5.20 g/dL    ALT (SGPT) 16 1 - 33 U/L    AST (SGOT) 19 1 - 32 U/L    Alkaline Phosphatase 101 39 - 117 U/L    Total Bilirubin 0.6 0.0 - 1.2 mg/dL    eGFR Non African Amer 88 >60 mL/min/1.73    Globulin 3.3 gm/dL    A/G Ratio 1.0 g/dL    BUN/Creatinine Ratio 12.3 7.0 - 25.0    Anion Gap 9.6 5.0 - 15.0 mmol/L   Procalcitonin    Collection Time: 08/16/20  4:57 PM   Result Value Ref Range    Procalcitonin 0.06 0.00 - 0.25 ng/mL   Troponin    Collection Time: 08/16/20  4:57 PM   Result Value Ref Range    Troponin T <0.010 0.000 - 0.030 ng/mL   BNP    Collection Time: 08/16/20  4:57 PM   Result Value Ref Range    proBNP 1,631.0 0.0-1,800.0 pg/mL   Digoxin Level    Collection Time: 08/16/20  4:57 PM   Result Value Ref Range    Digoxin 1.10 0.60 - 1.20 ng/mL   Magnesium    Collection Time: 08/16/20  4:57 PM   Result Value Ref Range    Magnesium 1.3 (L) 1.6 - 2.4 mg/dL   CBC Auto Differential    Collection Time: 08/16/20  4:57 PM   Result  Value Ref Range    WBC 6.73 3.40 - 10.80 10*3/mm3    RBC 3.54 (L) 3.77 - 5.28 10*6/mm3    Hemoglobin 10.3 (L) 12.0 - 15.9 g/dL    Hematocrit 31.2 (L) 34.0 - 46.6 %    MCV 88.1 79.0 - 97.0 fL    MCH 29.1 26.6 - 33.0 pg    MCHC 33.0 31.5 - 35.7 g/dL    RDW 13.1 12.3 - 15.4 %    RDW-SD 42.0 37.0 - 54.0 fl    MPV 11.8 6.0 - 12.0 fL    Platelets 260 140 - 450 10*3/mm3    Neutrophil % 68.1 42.7 - 76.0 %    Lymphocyte % 19.8 19.6 - 45.3 %    Monocyte % 8.3 5.0 - 12.0 %    Eosinophil % 2.5 0.3 - 6.2 %    Basophil % 0.9 0.0 - 1.5 %    Immature Grans % 0.4 0.0 - 0.5 %    Neutrophils, Absolute 4.58 1.70 - 7.00 10*3/mm3    Lymphocytes, Absolute 1.33 0.70 - 3.10 10*3/mm3    Monocytes, Absolute 0.56 0.10 - 0.90 10*3/mm3    Eosinophils, Absolute 0.17 0.00 - 0.40 10*3/mm3    Basophils, Absolute 0.06 0.00 - 0.20 10*3/mm3    Immature Grans, Absolute 0.03 0.00 - 0.05 10*3/mm3    nRBC 0.0 0.0 - 0.2 /100 WBC   Protime-INR    Collection Time: 08/16/20  4:59 PM   Result Value Ref Range    Protime 17.1 (H) 11.7 - 14.2 Seconds    INR 1.43 (H) 0.90 - 1.10   Lactic Acid, Plasma    Collection Time: 08/16/20  4:59 PM   Result Value Ref Range    Lactate 1.2 0.5 - 2.0 mmol/L   Urinalysis With Culture If Indicated - Urine, Clean Catch    Collection Time: 08/16/20  5:36 PM   Result Value Ref Range    Color, UA Yellow Yellow, Straw    Appearance, UA Clear Clear    pH, UA 7.5 5.0 - 8.0    Specific Gravity, UA 1.012 1.005 - 1.030    Glucose, UA Negative Negative    Ketones, UA Negative Negative    Bilirubin, UA Negative Negative    Blood, UA Negative Negative    Protein, UA Negative Negative    Leuk Esterase, UA Negative Negative    Nitrite, UA Negative Negative    Urobilinogen, UA 1.0 E.U./dL 0.2 - 1.0 E.U./dL       Ordered the above labs and independently reviewed the results.        RADIOLOGY  Xr Chest 1 View    Result Date: 8/16/2020  PORTABLE CHEST  HISTORY: Dyspnea.  TECHNIQUE: A single portable view of chest was obtained and compared to  11/09/2019.  FINDINGS: The heart is within normal limits in size. There is a moderate-to-large left pleural effusion which is increased in size as compared to the prior examination. There is likely layering of a pleural fluid collection on the right. There is left lower lobe atelectasis/infiltrate. There is no evidence of congestive failure.        I ordered the above noted radiological studies. Reviewed by me and discussed with radiologist.  See dictation for official radiology interpretation.      PROCEDURES    Procedures        MEDICATIONS GIVEN IN ER    Medications   furosemide (LASIX) injection 40 mg (has no administration in time range)   magnesium sulfate 2g/50 mL (PREMIX) infusion (has no administration in time range)   pantoprazole (PROTONIX) injection 40 mg (40 mg Intravenous Given 8/16/20 1934)         PROGRESS, DATA ANALYSIS, CONSULTS, AND MEDICAL DECISION MAKING    My differential diagnosis for dyspnea includes but is not limited to:  Asthma, COPD, pneumonia, pulmonary embolus, acute respiratory distress syndrome, pneumothorax, pleural effusion, pulmonary fibrosis, congestive heart failure, myocardial infarction, DKA, uremia, acidosis, sepsis, anemia, drug related, hyperventilation, CNS disease      All labs have been independently reviewed by me.  All radiology studies have been reviewed by me and discussed with radiologist dictating the report.   EKG's independently viewed and interpreted by me.  Discussion below represents my analysis of pertinent findings related to patient's condition, differential diagnosis, treatment plan and final disposition.      ED Course as of Aug 16 1948   Sun Aug 16, 2020   1636 EKG           EKG time: 1632  Rhythm/Rate: A. fib; ventricular rate 80  P waves and ID: NA  QRS, axis: Narrow complex  ST and T waves: No STEMI; QTC within normal limits; low voltage    Interpreted Contemporaneously by me, independently viewed  Comparison: Similar appearance 11/9/2019      [RS]    1858 Patient with abnormal chest x-ray result.  Patient does not report any dark or bloody stools.  Hemoccult obtained and chaperoned by COOKIE Crowder.  No gross blood on digital rectal exam however Hemoccult positive stool is noted.    [RS]   1901 Plan admission for progressive dyspnea with lab abnormalities and lower GI bleed.  Patient agreeable with plan.    [RS]   1946 Case reviewed with JENNYFER Vale who is accepting the patient for admission on behalf of Dr. Valenzuela as a full admission with telemetry.    [RS]   1947 I have updated the patient with results as well as plan for admission.  Patient agreeable with this plan.    [RS]   1948 Hemoglobin(!): 10.3 [RS]   1948 Magnesium(!): 1.3 [RS]   1948 INR(!): 1.43 [RS]      ED Course User Index  [RS] Stephen Mckeon MD       AS OF 19:48 VITALS:    BP - 172/91  HR - 82  TEMP - 98.3 °F (36.8 °C) (Oral)  O2 SATS - 92%        DIAGNOSIS  Final diagnoses:   Dyspnea on exertion   Pleural effusion on left   Acute blood loss anemia   Chronic atrial fibrillation (CMS/HCC)   Chronic anticoagulation   Hypomagnesemia         DISPOSITION  ADMISSION    Discussed treatment plan and reason for admission with pt/family and admitting physician.  Pt/family voiced understanding of the plan for admission for further testing/treatment as needed.          Stephen Mckeon MD  08/16/20 0228

## 2020-08-16 NOTE — ED NOTES
.Pt masked on arrival, RN wearing mask/goggles during encounter       Duc Zee, RN  08/16/20 1336

## 2020-08-16 NOTE — ED TRIAGE NOTES
Patient presents to er via private vehicle from home.  Patient is reporting dyspnea, cough but no fever.  Patient was placed in face mask during first look triage.  Patient was wearing a face mask throughout encounter.  I wore personal protective equipment throughout the encounter.  Hand hygiene was performed before and after patient encounter.

## 2020-08-16 NOTE — PROGRESS NOTES
Clinical Pharmacy Services: Medication History    Jordana Hdz is a 79 y.o. female presenting to [facility] for   Chief Complaint   Patient presents with   • Shortness of Breath       She  has a past medical history of Acute congestive heart failure (CMS/HCC), Atrial fibrillation (CMS/HCC), Atrial flutter (CMS/HCC), Disease of thyroid gland, GERD (gastroesophageal reflux disease), Hyperlipidemia, and Hypertension.    Allergies as of 08/16/2020   • (No Known Allergies)       Medication information was obtained from: Patient  Pharmacy and Phone Number: [facility] [phone]    Prior to Admission Medications     Prescriptions Last Dose Informant Patient Reported? Taking?    apixaban (ELIQUIS) 5 MG tablet tablet  Self Yes Yes    Take 5 mg by mouth 2 (Two) Times a Day.    atorvastatin (LIPITOR) 40 MG tablet  Self Yes Yes    Take 40 mg by mouth Daily.    carvedilol (COREG) 25 MG tablet  Self Yes Yes    Take 25 mg by mouth 2 (Two) Times a Day With Meals.    cetirizine (zyrTEC) 10 MG tablet  Self Yes Yes    Take 10 mg by mouth Daily.    digoxin (LANOXIN) 125 MCG tablet  Self No Yes    Take 1 tablet by mouth Daily.    furosemide (LASIX) 20 MG tablet  Self Yes Yes    Take 20 mg by mouth Daily.    levothyroxine (SYNTHROID, LEVOTHROID) 25 MCG tablet  Self Yes Yes    Take 25 mcg by mouth Daily.    omeprazole (priLOSEC) 20 MG capsule  Self Yes Yes    Take 20 mg by mouth Daily.    sertraline (ZOLOFT) 50 MG tablet  Self Yes Yes    Take  by mouth daily.            Medication notes: Removed: potassium (d/c by clinician about 1 month ago)     This medication list is complete to the best of my knowledge as of 8/16/2020    Please call if questions    Kimberly Feldman, Medication Reconciliation Technician #[id number]  8/16/2020 19:20

## 2020-08-17 ENCOUNTER — INPATIENT HOSPITAL (OUTPATIENT)
Dept: URBAN - METROPOLITAN AREA HOSPITAL 113 | Facility: HOSPITAL | Age: 80
End: 2020-08-17
Payer: COMMERCIAL

## 2020-08-17 DIAGNOSIS — Z86.010 PERSONAL HISTORY OF COLONIC POLYPS: ICD-10-CM

## 2020-08-17 DIAGNOSIS — I50.9 HEART FAILURE, UNSPECIFIED: ICD-10-CM

## 2020-08-17 DIAGNOSIS — I48.91 UNSPECIFIED ATRIAL FIBRILLATION: ICD-10-CM

## 2020-08-17 DIAGNOSIS — R19.5 OTHER FECAL ABNORMALITIES: ICD-10-CM

## 2020-08-17 DIAGNOSIS — M62.81 MUSCLE WEAKNESS (GENERALIZED): ICD-10-CM

## 2020-08-17 PROBLEM — D64.9 ANEMIA: Status: ACTIVE | Noted: 2020-08-17

## 2020-08-17 PROBLEM — I10 HTN (HYPERTENSION): Status: ACTIVE | Noted: 2020-08-17

## 2020-08-17 PROBLEM — I50.32 CHRONIC DIASTOLIC CHF (CONGESTIVE HEART FAILURE) (HCC): Status: ACTIVE | Noted: 2020-08-17

## 2020-08-17 PROBLEM — K92.2 GI BLEED: Status: ACTIVE | Noted: 2020-08-17

## 2020-08-17 PROBLEM — E03.9 HYPOTHYROIDISM (ACQUIRED): Status: ACTIVE | Noted: 2020-08-17

## 2020-08-17 PROBLEM — J90 PLEURAL EFFUSION ON LEFT: Status: ACTIVE | Noted: 2020-08-17

## 2020-08-17 PROBLEM — E78.5 HLD (HYPERLIPIDEMIA): Status: ACTIVE | Noted: 2020-08-17

## 2020-08-17 PROBLEM — E83.42 HYPOMAGNESEMIA: Status: ACTIVE | Noted: 2020-08-17

## 2020-08-17 PROBLEM — K21.9 GERD (GASTROESOPHAGEAL REFLUX DISEASE): Status: ACTIVE | Noted: 2020-08-17

## 2020-08-17 LAB
ANION GAP SERPL CALCULATED.3IONS-SCNC: 14.4 MMOL/L (ref 5–15)
BUN SERPL-MCNC: 7 MG/DL (ref 8–23)
BUN/CREAT SERPL: 10.8 (ref 7–25)
CALCIUM SPEC-SCNC: 8.3 MG/DL (ref 8.6–10.5)
CHLORIDE SERPL-SCNC: 98 MMOL/L (ref 98–107)
CO2 SERPL-SCNC: 27.6 MMOL/L (ref 22–29)
CREAT SERPL-MCNC: 0.65 MG/DL (ref 0.57–1)
DEPRECATED RDW RBC AUTO: 44.1 FL (ref 37–54)
ERYTHROCYTE [DISTWIDTH] IN BLOOD BY AUTOMATED COUNT: 13.2 % (ref 12.3–15.4)
FERRITIN SERPL-MCNC: 19 NG/ML (ref 13–150)
FOLATE SERPL-MCNC: 8.16 NG/ML (ref 4.78–24.2)
GFR SERPL CREATININE-BSD FRML MDRD: 88 ML/MIN/1.73
GLUCOSE SERPL-MCNC: 101 MG/DL (ref 65–99)
HCT VFR BLD AUTO: 32.2 % (ref 34–46.6)
HCT VFR BLD AUTO: 33.1 % (ref 34–46.6)
HGB BLD-MCNC: 10.3 G/DL (ref 12–15.9)
HGB BLD-MCNC: 10.9 G/DL (ref 12–15.9)
IRON 24H UR-MRATE: 38 MCG/DL (ref 37–145)
IRON SATN MFR SERPL: 12 % (ref 20–50)
MAGNESIUM SERPL-MCNC: 1.9 MG/DL (ref 1.6–2.4)
MCH RBC QN AUTO: 28.9 PG (ref 26.6–33)
MCHC RBC AUTO-ENTMCNC: 32 G/DL (ref 31.5–35.7)
MCV RBC AUTO: 90.2 FL (ref 79–97)
PLATELET # BLD AUTO: 252 10*3/MM3 (ref 140–450)
PMV BLD AUTO: 11.1 FL (ref 6–12)
POTASSIUM SERPL-SCNC: 3.6 MMOL/L (ref 3.5–5.2)
RBC # BLD AUTO: 3.57 10*6/MM3 (ref 3.77–5.28)
RETICS # AUTO: 0.06 10*6/MM3 (ref 0.02–0.13)
RETICS/RBC NFR AUTO: 1.7 % (ref 0.7–1.9)
SODIUM SERPL-SCNC: 140 MMOL/L (ref 136–145)
TIBC SERPL-MCNC: 305 MCG/DL (ref 298–536)
TRANSFERRIN SERPL-MCNC: 205 MG/DL (ref 200–360)
URATE SERPL-MCNC: 4.9 MG/DL (ref 2.4–5.7)
VIT B12 BLD-MCNC: 285 PG/ML (ref 211–946)
WBC # BLD AUTO: 8.15 10*3/MM3 (ref 3.4–10.8)

## 2020-08-17 PROCEDURE — 82607 VITAMIN B-12: CPT | Performed by: NURSE PRACTITIONER

## 2020-08-17 PROCEDURE — 99291 CRITICAL CARE FIRST HOUR: CPT | Performed by: INTERNAL MEDICINE

## 2020-08-17 PROCEDURE — 85018 HEMOGLOBIN: CPT | Performed by: NURSE PRACTITIONER

## 2020-08-17 PROCEDURE — 84550 ASSAY OF BLOOD/URIC ACID: CPT | Performed by: HOSPITALIST

## 2020-08-17 PROCEDURE — 80048 BASIC METABOLIC PNL TOTAL CA: CPT | Performed by: NURSE PRACTITIONER

## 2020-08-17 PROCEDURE — 85045 AUTOMATED RETICULOCYTE COUNT: CPT | Performed by: NURSE PRACTITIONER

## 2020-08-17 PROCEDURE — 82728 ASSAY OF FERRITIN: CPT | Performed by: NURSE PRACTITIONER

## 2020-08-17 PROCEDURE — 36415 COLL VENOUS BLD VENIPUNCTURE: CPT | Performed by: NURSE PRACTITIONER

## 2020-08-17 PROCEDURE — 85027 COMPLETE CBC AUTOMATED: CPT | Performed by: NURSE PRACTITIONER

## 2020-08-17 PROCEDURE — 85014 HEMATOCRIT: CPT | Performed by: NURSE PRACTITIONER

## 2020-08-17 PROCEDURE — 83735 ASSAY OF MAGNESIUM: CPT | Performed by: NURSE PRACTITIONER

## 2020-08-17 PROCEDURE — 25010000002 FUROSEMIDE PER 20 MG: Performed by: HOSPITALIST

## 2020-08-17 PROCEDURE — 84466 ASSAY OF TRANSFERRIN: CPT | Performed by: NURSE PRACTITIONER

## 2020-08-17 PROCEDURE — 82746 ASSAY OF FOLIC ACID SERUM: CPT | Performed by: NURSE PRACTITIONER

## 2020-08-17 PROCEDURE — 83540 ASSAY OF IRON: CPT | Performed by: NURSE PRACTITIONER

## 2020-08-17 RX ORDER — FUROSEMIDE 10 MG/ML
40 INJECTION INTRAMUSCULAR; INTRAVENOUS EVERY 12 HOURS
Status: DISCONTINUED | OUTPATIENT
Start: 2020-08-17 | End: 2020-08-20

## 2020-08-17 RX ORDER — FUROSEMIDE 20 MG/1
20 TABLET ORAL DAILY
Status: DISCONTINUED | OUTPATIENT
Start: 2020-08-17 | End: 2020-08-17

## 2020-08-17 RX ADMIN — SODIUM CHLORIDE, PRESERVATIVE FREE 10 ML: 5 INJECTION INTRAVENOUS at 09:51

## 2020-08-17 RX ADMIN — LEVOTHYROXINE SODIUM 25 MCG: 25 TABLET ORAL at 09:49

## 2020-08-17 RX ADMIN — FUROSEMIDE 40 MG: 10 INJECTION, SOLUTION INTRAMUSCULAR; INTRAVENOUS at 06:28

## 2020-08-17 RX ADMIN — APIXABAN 5 MG: 5 TABLET, FILM COATED ORAL at 20:50

## 2020-08-17 RX ADMIN — PANTOPRAZOLE SODIUM 40 MG: 40 TABLET, DELAYED RELEASE ORAL at 06:28

## 2020-08-17 RX ADMIN — ATORVASTATIN CALCIUM 40 MG: 20 TABLET, FILM COATED ORAL at 09:50

## 2020-08-17 RX ADMIN — APIXABAN 5 MG: 5 TABLET, FILM COATED ORAL at 09:49

## 2020-08-17 RX ADMIN — DIGOXIN 125 MCG: 125 TABLET ORAL at 09:50

## 2020-08-17 RX ADMIN — SODIUM CHLORIDE, PRESERVATIVE FREE 10 ML: 5 INJECTION INTRAVENOUS at 20:51

## 2020-08-17 RX ADMIN — CARVEDILOL 25 MG: 25 TABLET, FILM COATED ORAL at 09:50

## 2020-08-17 RX ADMIN — CARVEDILOL 25 MG: 25 TABLET, FILM COATED ORAL at 00:46

## 2020-08-17 RX ADMIN — SERTRALINE 50 MG: 50 TABLET, FILM COATED ORAL at 09:49

## 2020-08-17 NOTE — CONSULTS
Patient Identification:  Jordana Hdz  79 y.o.  female  1940  [id number]          LOS 1    Requesting physician: Dr Hernandez    Reason for Consultation: Pleural effusion    History of Present Illness:   79-year-old female with chronic diastolic CHF and atrial fibrillation presents with shortness of breath.  Chest x-ray shows moderate left pleural effusion.  Has had associated fatigue.  No productive cough, fevers or chest pain.  Has had some loose stools for the last few weeks.  Has received IV Lasix which has helped her dyspnea.    Past Medical History:  Past Medical History:   Diagnosis Date   • Acute congestive heart failure (CMS/HCC)    • Atrial fibrillation (CMS/HCC)    • Atrial flutter (CMS/HCC)    • Disease of thyroid gland    • GERD (gastroesophageal reflux disease)    • Hyperlipidemia    • Hypertension        Past Surgical History:  Past Surgical History:   Procedure Laterality Date   • CATARACT EXTRACTION     • TUBAL ABDOMINAL LIGATION          Home Meds:  Medications Prior to Admission   Medication Sig Dispense Refill Last Dose   • apixaban (ELIQUIS) 5 MG tablet tablet Take 5 mg by mouth 2 (Two) Times a Day.   8/16/2020 at Unknown time   • atorvastatin (LIPITOR) 40 MG tablet Take 40 mg by mouth Daily.   8/16/2020 at Unknown time   • carvedilol (COREG) 25 MG tablet Take 25 mg by mouth 2 (Two) Times a Day With Meals.   8/16/2020 at Unknown time   • digoxin (LANOXIN) 125 MCG tablet Take 1 tablet by mouth Daily. 30 tablet 11 8/16/2020 at Unknown time   • furosemide (LASIX) 20 MG tablet Take 20 mg by mouth Daily.   8/16/2020 at Unknown time   • levothyroxine (SYNTHROID, LEVOTHROID) 25 MCG tablet Take 25 mcg by mouth Daily.   8/16/2020 at Unknown time   • omeprazole (priLOSEC) 20 MG capsule Take 20 mg by mouth Daily.   8/15/2020 at Unknown time   • sertraline (ZOLOFT) 50 MG tablet Take  by mouth daily.   8/15/2020 at Unknown time   • cetirizine (zyrTEC) 10 MG tablet Take 10 mg by mouth Daily As  "Needed.   Unknown at Unknown time         Allergies:  No Known Allergies    Social History:   Social History     Socioeconomic History   • Marital status:      Spouse name: Not on file   • Number of children: Not on file   • Years of education: Not on file   • Highest education level: Not on file   Tobacco Use   • Smoking status: Former Smoker     Packs/day: 1.00     Years: 15.00     Pack years: 15.00     Types: Cigarettes     Last attempt to quit:      Years since quittin.6   • Smokeless tobacco: Never Used   • Tobacco comment: caffeine 1-2 cups coffee daily   Substance and Sexual Activity   • Alcohol use: Yes     Comment: rarely   • Drug use: No   • Sexual activity: Defer   Lifestyle   • Physical activity:     Days per week: 0 days     Minutes per session: 0 min   • Stress: To some extent       Family History:  Family History   Problem Relation Age of Onset   • Heart attack Father    • Stroke Father    • Hypertension Father    • Aneurysm Father         brain   • Heart disease Father    • Diabetes Paternal Aunt        Review of Systems:  Denies fevers or chills  Denies nausea or vomiting  No new vision or hearing changes  No chest pain  shortness of breath  No diarrhea, hematemesis or hematochezia, no dysuria or frequency  No musculoskeletal complaints  No heat or cold intolerance  No skin rashes  No dizziness or confusion.  No seizure activity  No new anxiety or depression  12 system review of systems performed and all else negative    Objective:    PHYSICAL EXAM:    /56 (BP Location: Left arm, Patient Position: Lying)   Pulse 77   Temp 97.4 °F (36.3 °C) (Oral)   Resp 17   Ht 172.7 cm (68\")   Wt 89.3 kg (196 lb 14.4 oz)   SpO2 91%   BMI 29.94 kg/m²  Body mass index is 29.94 kg/m². 91% 89.3 kg (196 lb 14.4 oz)    GENERAL APPEARANCE:   · Well developed  · Well nourished  · No acute distress   EYES:    · PERRL                                                                         "   · Conjunctivae normal  · Sclerae nonicteric.  HENT:   · Atraumatic, normocephalic  · External ears and nose normal  · Moist mucous membranes and no ulcers  NECK:  · Thyroid not enlarged  · Trachea midline   RESPIRATORY:    · Nonlabored breathing   · Normal breath sounds  · No rales. No wheezing  · Dullness to percussion left posterior base  CARDIOVASCULAR:    · RRR  · Normal S1, S2  · No murmur  · Lower extremity edema: none    GI:   · Bowel sounds normal  · Abdomen soft , nondistended, nontender  · No abdominal masses  MUSCULOSKELETAL:  · Normal movement of extremities  · No tenderness, no deformities  · No clubbing or cyanosis   Skin:    · No visible rashes  · No palpable nodules  PSYCHIATRIC:  · Speech and behavior appropriate  · Normal mood and affect  · Oriented to person, place and time  NEUROLOGIC:  Cranial nerves II to XII grossly intact.  Sensation intact    Lab Review:      Lab Results   Component Value Date    WBC 8.15 08/17/2020    HGB 10.3 (L) 08/17/2020    HCT 32.2 (L) 08/17/2020    MCV 90.2 08/17/2020     08/17/2020            Lab Results   Component Value Date    CREATININE 0.65 08/17/2020    BUN 7 (L) 08/17/2020     08/17/2020    K 3.6 08/17/2020    CL 98 08/17/2020    CO2 27.6 08/17/2020        Lab Results   Component Value Date    TROPONINT <0.010 08/16/2020                Imaging reviewed  chest X-ray shows moderate left pleural effusion       Assessment:  Acute hypoxemic respiratory failure due to moderate left pleural effusion  Acute on chronic diastolic CHF  Chronic atrial fibrillation  Hypothyroidism  Former smoker quit 1976    Recommendations:  Seems to be responding to diuretic.  Continue and monitor with plans to consider thoracentesis if no improvement in pleural effusion with diuresis.      Thank you for allowing me to participate in the care of this patient.  I will continue to follow along with you.      Mo Villalobos MD  [facility], [facility]  Pulmonary and  Critical Care Medicine    8/17/2020  17:20

## 2020-08-17 NOTE — PROGRESS NOTES
Discharge Planning Assessment  [facility]     Patient Name: Jordana Hdz  MRN: [id number]  Today's Date: 8/17/2020    Admit Date: 8/16/2020    Discharge Needs Assessment     Row Name 08/17/20 0931       Living Environment    Current Living Arrangements  home/apartment/condo    Primary Care Provided by  self    Provides Primary Care For  no one    Family Caregiver if Needed  none    Quality of Family Relationships  helpful;involved;supportive       Resource/Environmental Concerns    Resource/Environmental Concerns  none    Transportation Concerns  car, none       Transition Planning    Patient/Family Anticipates Transition to  home    Patient/Family Anticipated Services at Transition  none    Transportation Anticipated  car, drives self;family or friend will provide       Discharge Needs Assessment    Readmission Within the Last 30 Days  no previous admission in last 30 days    Concerns to be Addressed  no discharge needs identified    Equipment Currently Used at Home  none    Anticipated Changes Related to Illness  none    Equipment Needed After Discharge  none        Discharge Plan     Row Name 08/17/20 0932       Plan    Plan  return home- CCP will follow for needs    Patient/Family in Agreement with Plan  yes    Plan Comments  Spoke with patient at bedside.  Facesheet, PCP and pharmacy verified.  Patient lives alone in a condominium. Her bedroom and bathroom are on the second floor.  She is IADLS and drives.  She does not use any DME and does not have a HH or SNF history.  States that her daughter, Evelyn Meehan ( [phone]), can assist as needed.  She denies any needs and plans to return home.  CCP will follow. Betsey Solorzano RN        Destination      Coordination has not been started for this encounter.      Durable Medical Equipment      Coordination has not been started for this encounter.      Dialysis/Infusion      Coordination has not been started for this encounter.      Home Medical Care       Coordination has not been started for this encounter.      Therapy      Coordination has not been started for this encounter.      Community Resources      Coordination has not been started for this encounter.          Demographic Summary     Row Name 08/17/20 0931       General Information    Admission Type  inpatient    Arrived From  emergency department    Required Notices Provided  Important Message from Medicare    Referral Source  admission list    Reason for Consult  discharge planning    Preferred Language  English        Functional Status     Row Name 08/17/20 0931       Functional Status    Usual Activity Tolerance  good    Current Activity Tolerance  good       Functional Status, IADL    Medications  independent    Meal Preparation  independent    Housekeeping  independent    Laundry  independent    Shopping  independent       Mental Status    General Appearance WDL  WDL       Mental Status Summary    Recent Changes in Mental Status/Cognitive Functioning  no changes        Psychosocial    No documentation.       Abuse/Neglect    No documentation.       Legal    No documentation.       Substance Abuse    No documentation.       Patient Forms    No documentation.           Betsey Solorzano RN

## 2020-08-17 NOTE — PLAN OF CARE
Patient resting in bed. No c/o of SOA while ambulating. Lung sounds diminished JANNET and LLL. VSS, pt currently on RA. Echo scheduled for today. Plan to continue lasix and possibly D/C tomorrow.

## 2020-08-17 NOTE — PROGRESS NOTES
Name: Jordana Hdz ADMIT: 2020   : 1940  PCP: Olive Claire MD    MRN: [id number] LOS: 1 days   AGE/SEX: 79 y.o. female  ROOM: [facility]     Subjective   Subjective   Patient is lying on the bed in no major distress.  Denies nausea, vomiting abdominal pain, chest pain.  Shortness of breath has been improving.       Objective   Objective   Vital Signs  Temp:  [97.4 °F (36.3 °C)-98.3 °F (36.8 °C)] 97.4 °F (36.3 °C)  Heart Rate:  [76-93] 77  Resp:  [17-20] 17  BP: (113-200)/() 113/56  SpO2:  [91 %-99 %] 91 %  on  Flow (L/min):  [1] 1;   Device (Oxygen Therapy): nasal cannula  Body mass index is 29.94 kg/m².  Physical Exam    HEENT:  Atraumatic, normocephalic.  PERRLA.  Extraocular movements intact.  Conjunctivae pink.  Sclerae, no icterus.  Mucous membranes dry.  Oropharynx is  clear.  NECK:  Supple.  No JVD.  HEART:  Irregular  rhythm.  Normal S1, S2.   LUNGS:  Fairly clear to auscultation anteriorly.  No wheezes.  No crackles.  ABDOMEN:   Soft, nontender.  Bowel sounds present.  No rebound.  No  guarding.  EXTREMITIES:  No cyanosis, clubbing, or edema.  Palpable pedal pulses.  NEURO:  Grossly nonfocal.  No facial asymmetry.  Good strength in all 4  extremities.  SKIN:  Warm and dry.  No evidence of rashes.  LYMPH NODES:  No palpable cervical or supraclavicular lymphadenopathy.    Results Review:       I reviewed the patient's new clinical results.  Results from last 7 days   Lab Units 20  0615 20  0054 20  1657   WBC 10*3/mm3 8.15  --  6.73   HEMOGLOBIN g/dL 10.3* 10.9* 10.3*   PLATELETS 10*3/mm3 252  --  260     Results from last 7 days   Lab Units 20  0615 20  1657   SODIUM mmol/L 140 139   POTASSIUM mmol/L 3.6 3.9   CHLORIDE mmol/L 98 102   CO2 mmol/L 27.6 27.4   BUN mg/dL 7* 8   CREATININE mg/dL 0.65 0.65   GLUCOSE mg/dL 101* 142*   Estimated Creatinine Clearance: 67.8 mL/min (by C-G formula based on SCr of 0.65 mg/dL).  Results from last 7 days   Lab  Units 08/16/20  1657   ALBUMIN g/dL 3.40*   BILIRUBIN mg/dL 0.6   ALK PHOS U/L 101   AST (SGOT) U/L 19   ALT (SGPT) U/L 16     Results from last 7 days   Lab Units 08/17/20  0615 08/16/20  1657   CALCIUM mg/dL 8.3* 8.2*   ALBUMIN g/dL  --  3.40*   MAGNESIUM mg/dL 1.9 1.3*     Results from last 7 days   Lab Units 08/16/20  1659 08/16/20  1657   PROCALCITONIN ng/mL  --  0.06   LACTATE mmol/L 1.2  --      COVID19   Date Value Ref Range Status   08/16/2020 Not Detected Not Detected - Ref. Range Final     No results found for: HGBA1C, POCGLU    XR Chest 1 View  PORTABLE CHEST     HISTORY: Dyspnea.     TECHNIQUE: A single portable view of chest was obtained and compared to  11/09/2019.     FINDINGS: The heart is within normal limits in size. There is a  moderate-to-large left pleural effusion which is increased in size as  compared to the prior examination. There is likely layering of a pleural  fluid collection on the right. There is left lower lobe  atelectasis/infiltrate. There is no evidence of congestive failure.     This report was finalized on 8/17/2020 8:47 AM by Dr. Cory Rodriguez M.D.           apixaban 5 mg Oral Q12H   atorvastatin 40 mg Oral Daily   carvedilol 25 mg Oral BID With Meals   digoxin 125 mcg Oral Daily   furosemide 40 mg Intravenous Q12H   levothyroxine 25 mcg Oral Daily   pantoprazole 40 mg Oral QAM   sertraline 50 mg Oral Daily   sodium chloride 10 mL Intravenous Q12H      Diet Regular       Assessment/Plan     Active Hospital Problems    Diagnosis  POA   • **Dyspnea on exertion [R06.00]  Yes   • Atrial fibrillation (CMS/HCC) [I48.91]  Yes   • Hypothyroidism (acquired) [E03.9]  Yes   • GERD (gastroesophageal reflux disease) [K21.9]  Yes   • HLD (hyperlipidemia) [E78.5]  Yes   • HTN (hypertension) [I10]  Yes   • Pleural effusion on left [J90]  Yes   • Anemia [D64.9]  Yes   • Hypomagnesemia [E83.42]  Yes   • GI bleed [K92.2]  Yes   • Chronic diastolic CHF (congestive heart failure) (CMS/[facility])  [I50.32]  Yes      Resolved Hospital Problems   No resolved problems to display.       1. Acute hypoxic respiratory failure secondary to moderate to large left-sided pleural effusion, this is most likely secondary to acute diastolic heart failure and patient is currently on IV Lasix.  Cardiology and Pulmonary consultation will be obtained.  Continue with supplemental oxygen for a moment.  2. Anemia, occult blood is positive and GI consult has been obtained.   No obvious GI bleed.  3. History of atrial fibrillation, currently patient is rate controlled.  Continue with Eliquis, Coreg and digoxin.    4. Hypothyroidism, on Synthroid.    5. History of hypertension, blood pressure is reasonably well controlled and will continue with Coreg.    6. Hyperlipidemia, on statins.    7. Hypomagnesemia, magnesium is being replaced.  8. On Eliquis, no need for Lovenox for DVT prophylaxis.    9. Code status is full code.    Jc Hernandez MD  [facility]  08/17/20  16:20

## 2020-08-17 NOTE — CONSULTS
Inpatient Gastroenterology Consult  Consult performed by: Jared Cooper MD  Consult ordered by: Akanksha Grayson APRN          Patient Care Team:  Olive Claire MD as PCP - General    Chief complaint: weakness    Subjective     History of Present Illness  Pleasant lady admitted with increasing shortness of breath, fatique, extremiity swelling, and weakness.    She has been found to have congestive heart failure and also pleural effusions.  She is feeling better but is weak and short of air.  She has a heme + stool but no overt signs of bleeding .  Last colonoscopy was years ago 2010 had a hyper plastic polyp   Upper endoscopy noted gastritis   She stated she elected to not followup on further colonoscopy.  Review of Systems   Gastrointestinal: Positive for blood in stool.   Neurological: Positive for weakness.        Past Medical History:   Diagnosis Date   • Acute congestive heart failure (CMS/HCC)    • Atrial fibrillation (CMS/HCC)    • Atrial flutter (CMS/HCC)    • Disease of thyroid gland    • GERD (gastroesophageal reflux disease)    • Hyperlipidemia    • Hypertension    ,   Past Surgical History:   Procedure Laterality Date   • CATARACT EXTRACTION     • TUBAL ABDOMINAL LIGATION     ,   Family History   Problem Relation Age of Onset   • Heart attack Father    • Stroke Father    • Hypertension Father    • Aneurysm Father         brain   • Heart disease Father    • Diabetes Paternal Aunt    ,   Social History     Tobacco Use   • Smoking status: Former Smoker     Packs/day: 1.00     Years: 15.00     Pack years: 15.00     Types: Cigarettes     Last attempt to quit:      Years since quittin.6   • Smokeless tobacco: Never Used   • Tobacco comment: caffeine 1-2 cups coffee daily   Substance Use Topics   • Alcohol use: Yes     Comment: rarely   • Drug use: No   ,   Medications Prior to Admission   Medication Sig Dispense Refill Last Dose   • apixaban (ELIQUIS) 5 MG tablet tablet Take 5 mg by mouth 2  (Two) Times a Day.   8/16/2020 at Unknown time   • atorvastatin (LIPITOR) 40 MG tablet Take 40 mg by mouth Daily.   8/16/2020 at Unknown time   • carvedilol (COREG) 25 MG tablet Take 25 mg by mouth 2 (Two) Times a Day With Meals.   8/16/2020 at Unknown time   • digoxin (LANOXIN) 125 MCG tablet Take 1 tablet by mouth Daily. 30 tablet 11 8/16/2020 at Unknown time   • furosemide (LASIX) 20 MG tablet Take 20 mg by mouth Daily.   8/16/2020 at Unknown time   • levothyroxine (SYNTHROID, LEVOTHROID) 25 MCG tablet Take 25 mcg by mouth Daily.   8/16/2020 at Unknown time   • omeprazole (priLOSEC) 20 MG capsule Take 20 mg by mouth Daily.   8/15/2020 at Unknown time   • sertraline (ZOLOFT) 50 MG tablet Take  by mouth daily.   8/15/2020 at Unknown time   • cetirizine (zyrTEC) 10 MG tablet Take 10 mg by mouth Daily As Needed.   Unknown at Unknown time   , Scheduled Meds:    apixaban 5 mg Oral Q12H   atorvastatin 40 mg Oral Daily   carvedilol 25 mg Oral BID With Meals   digoxin 125 mcg Oral Daily   furosemide 40 mg Intravenous Q12H   levothyroxine 25 mcg Oral Daily   pantoprazole 40 mg Oral QAM   sertraline 50 mg Oral Daily   sodium chloride 10 mL Intravenous Q12H   , Continuous Infusions:   , PRN Meds:  •  acetaminophen **OR** acetaminophen **OR** acetaminophen  •  nitroglycerin  •  ondansetron  •  sodium chloride and Allergies:  Patient has no known allergies.    Objective      Vital Signs  Temp:  [97.7 °F (36.5 °C)-99.5 °F (37.5 °C)] 97.7 °F (36.5 °C)  Heart Rate:  [] 87  Resp:  [17-20] 17  BP: (143-200)/() 149/87    Physical Exam   Constitutional: She is oriented to person, place, and time. She appears well-developed and well-nourished.   HENT:   Mouth/Throat: Oropharynx is clear and moist.   Eyes: Conjunctivae are normal.   Neck: Neck supple.   Cardiovascular: Normal rate and regular rhythm.   Pulmonary/Chest: Effort normal. She has decreased breath sounds in the right lower field, the left middle field and the  left lower field. She has rales in the right middle field.   Abdominal: Soft. Bowel sounds are normal.   Neurological: She is alert and oriented to person, place, and time.   Skin: Skin is warm and dry.       Results Review:    I reviewed the patient's new clinical results.        Assessment/Plan       Dyspnea on exertion    Atrial fibrillation (CMS/HCC)    Hypothyroidism (acquired)    GERD (gastroesophageal reflux disease)    HLD (hyperlipidemia)    HTN (hypertension)    Pleural effusion on left    Anemia    Hypomagnesemia    GI bleed    Chronic diastolic CHF (congestive heart failure) (CMS/HCC)      Assessment:  (Heme + stools  Normocytic anemia with normal reticulocyte count  Personal history of colon polyps  Heart failure).     Plan:   (Discussed with patient at length  Plan elective outpatient upper and lower endoscopy,  Given she is being diuresed. I would hope the pleural effusions would be improved to ensure more safe procedure.  Will follow and check iron studies. And consider elective outpatient upper and lower endoscopy in the future thanks ).       I discussed the patients findings and my recommendations with patient and nursing staff    Jared Cooper MD  08/17/20  07:29    Time: Critical care 30 min

## 2020-08-17 NOTE — PLAN OF CARE
Problem: Patient Care Overview  Goal: Plan of Care Review  Flowsheets  Taken 8/17/2020 5038  Progress: no change  Outcome Summary: Pt was a new admit last night from ER for left pleural effusion and some anemia. H&H stable but a tad low. GI consulted for heme positive stool in ER. HR remains in controlled A.fib. No c/o significant pain. IV lasix started as ordered. Up adlib. AAOx4. Steady on feet to the bathroom. Pt reports feeling better after first dose of IV lasix given in ER. No c/o SOB or weakness upon excertion. Place on 1L NC because pt desats a litte when up to the bathroom. BP's have improved greatly. VSS. No other issues. Plan is for echo today and monitor labs. Continuing to monitor.  Taken 8/17/2020 0220  Plan of Care Reviewed With: patient

## 2020-08-18 ENCOUNTER — APPOINTMENT (OUTPATIENT)
Dept: GENERAL RADIOLOGY | Facility: HOSPITAL | Age: 80
End: 2020-08-18

## 2020-08-18 ENCOUNTER — INPATIENT HOSPITAL (OUTPATIENT)
Dept: URBAN - METROPOLITAN AREA HOSPITAL 113 | Facility: HOSPITAL | Age: 80
End: 2020-08-18
Payer: COMMERCIAL

## 2020-08-18 ENCOUNTER — APPOINTMENT (OUTPATIENT)
Dept: CARDIOLOGY | Facility: HOSPITAL | Age: 80
End: 2020-08-18

## 2020-08-18 DIAGNOSIS — K21.9 GASTRO-ESOPHAGEAL REFLUX DISEASE WITHOUT ESOPHAGITIS: ICD-10-CM

## 2020-08-18 DIAGNOSIS — K92.2 GASTROINTESTINAL HEMORRHAGE, UNSPECIFIED: ICD-10-CM

## 2020-08-18 DIAGNOSIS — R19.5 OTHER FECAL ABNORMALITIES: ICD-10-CM

## 2020-08-18 DIAGNOSIS — Z86.010 PERSONAL HISTORY OF COLONIC POLYPS: ICD-10-CM

## 2020-08-18 DIAGNOSIS — D64.89 OTHER SPECIFIED ANEMIAS: ICD-10-CM

## 2020-08-18 DIAGNOSIS — I50.32 CHRONIC DIASTOLIC (CONGESTIVE) HEART FAILURE: ICD-10-CM

## 2020-08-18 LAB
ANION GAP SERPL CALCULATED.3IONS-SCNC: 10.9 MMOL/L (ref 5–15)
AORTIC DIMENSIONLESS INDEX: 0.7 (DI)
BASOPHILS # BLD AUTO: 0.07 10*3/MM3 (ref 0–0.2)
BASOPHILS NFR BLD AUTO: 0.8 % (ref 0–1.5)
BH CV ECHO MEAS - ACS: 1.1 CM
BH CV ECHO MEAS - AO MAX PG (FULL): 6.4 MMHG
BH CV ECHO MEAS - AO MAX PG: 12.8 MMHG
BH CV ECHO MEAS - AO MEAN PG (FULL): 3.3 MMHG
BH CV ECHO MEAS - AO MEAN PG: 6.7 MMHG
BH CV ECHO MEAS - AO ROOT AREA (BSA CORRECTED): 1.3
BH CV ECHO MEAS - AO ROOT AREA: 5.4 CM^2
BH CV ECHO MEAS - AO ROOT DIAM: 2.6 CM
BH CV ECHO MEAS - AO V2 MAX: 179 CM/SEC
BH CV ECHO MEAS - AO V2 MEAN: 119.3 CM/SEC
BH CV ECHO MEAS - AO V2 VTI: 35.6 CM
BH CV ECHO MEAS - ASC AORTA: 2.7 CM
BH CV ECHO MEAS - AVA(I,A): 1.7 CM^2
BH CV ECHO MEAS - AVA(I,D): 1.7 CM^2
BH CV ECHO MEAS - AVA(V,A): 1.7 CM^2
BH CV ECHO MEAS - AVA(V,D): 1.7 CM^2
BH CV ECHO MEAS - BSA(HAYCOCK): 2.1 M^2
BH CV ECHO MEAS - BSA: 2 M^2
BH CV ECHO MEAS - BZI_BMI: 30 KILOGRAMS/M^2
BH CV ECHO MEAS - BZI_METRIC_HEIGHT: 172.7 CM
BH CV ECHO MEAS - BZI_METRIC_WEIGHT: 89.4 KG
BH CV ECHO MEAS - EDV(CUBED): 68.6 ML
BH CV ECHO MEAS - EDV(MOD-SP2): 82 ML
BH CV ECHO MEAS - EDV(MOD-SP4): 69 ML
BH CV ECHO MEAS - EDV(TEICH): 74 ML
BH CV ECHO MEAS - EF(CUBED): 77.9 %
BH CV ECHO MEAS - EF(MOD-BP): 71.1 %
BH CV ECHO MEAS - EF(MOD-SP2): 73.2 %
BH CV ECHO MEAS - EF(MOD-SP4): 69.6 %
BH CV ECHO MEAS - EF(TEICH): 70.6 %
BH CV ECHO MEAS - ESV(CUBED): 15.1 ML
BH CV ECHO MEAS - ESV(MOD-SP2): 22 ML
BH CV ECHO MEAS - ESV(MOD-SP4): 21 ML
BH CV ECHO MEAS - ESV(TEICH): 21.7 ML
BH CV ECHO MEAS - FS: 39.6 %
BH CV ECHO MEAS - IVS/LVPW: 1.1
BH CV ECHO MEAS - IVSD: 0.8 CM
BH CV ECHO MEAS - LAT PEAK E' VEL: 8.9 CM/SEC
BH CV ECHO MEAS - LV DIASTOLIC VOL/BSA (35-75): 34 ML/M^2
BH CV ECHO MEAS - LV MASS(C)D: 91.7 GRAMS
BH CV ECHO MEAS - LV MASS(C)DI: 45.2 GRAMS/M^2
BH CV ECHO MEAS - LV MAX PG: 6.5 MMHG
BH CV ECHO MEAS - LV MEAN PG: 3.4 MMHG
BH CV ECHO MEAS - LV SYSTOLIC VOL/BSA (12-30): 10.3 ML/M^2
BH CV ECHO MEAS - LV V1 MAX: 127.1 CM/SEC
BH CV ECHO MEAS - LV V1 MEAN: 86.6 CM/SEC
BH CV ECHO MEAS - LV V1 VTI: 25.6 CM
BH CV ECHO MEAS - LVIDD: 4.1 CM
BH CV ECHO MEAS - LVIDS: 2.5 CM
BH CV ECHO MEAS - LVLD AP2: 7.8 CM
BH CV ECHO MEAS - LVLD AP4: 7.5 CM
BH CV ECHO MEAS - LVLS AP2: 6.3 CM
BH CV ECHO MEAS - LVLS AP4: 5.8 CM
BH CV ECHO MEAS - LVOT AREA (M): 2.3 CM^2
BH CV ECHO MEAS - LVOT AREA: 2.4 CM^2
BH CV ECHO MEAS - LVOT DIAM: 1.7 CM
BH CV ECHO MEAS - LVPWD: 0.73 CM
BH CV ECHO MEAS - MED PEAK E' VEL: 8.3 CM/SEC
BH CV ECHO MEAS - MV DEC SLOPE: 769.1 CM/SEC^2
BH CV ECHO MEAS - MV DEC TIME: 170 SEC
BH CV ECHO MEAS - MV E MAX VEL: 135 CM/SEC
BH CV ECHO MEAS - MV MAX PG: 11.3 MMHG
BH CV ECHO MEAS - MV MEAN PG: 2.3 MMHG
BH CV ECHO MEAS - MV P1/2T MAX VEL: 163.9 CM/SEC
BH CV ECHO MEAS - MV P1/2T: 62.4 MSEC
BH CV ECHO MEAS - MV V2 MAX: 167.7 CM/SEC
BH CV ECHO MEAS - MV V2 MEAN: 63.1 CM/SEC
BH CV ECHO MEAS - MV V2 VTI: 42.5 CM
BH CV ECHO MEAS - MVA P1/2T LCG: 1.3 CM^2
BH CV ECHO MEAS - MVA(P1/2T): 3.5 CM^2
BH CV ECHO MEAS - MVA(VTI): 1.5 CM^2
BH CV ECHO MEAS - PA ACC TIME: 0.09 SEC
BH CV ECHO MEAS - PA MAX PG (FULL): 3.9 MMHG
BH CV ECHO MEAS - PA MAX PG: 6.2 MMHG
BH CV ECHO MEAS - PA PR(ACCEL): 39.8 MMHG
BH CV ECHO MEAS - PA V2 MAX: 124.3 CM/SEC
BH CV ECHO MEAS - PULM DIAS VEL: 110.2 CM/SEC
BH CV ECHO MEAS - PULM S/D: 0.37
BH CV ECHO MEAS - PULM SYS VEL: 40.5 CM/SEC
BH CV ECHO MEAS - PVA(V,A): 1.6 CM^2
BH CV ECHO MEAS - PVA(V,D): 1.6 CM^2
BH CV ECHO MEAS - QP/QS: 0.68
BH CV ECHO MEAS - RAP SYSTOLE: 3 MMHG
BH CV ECHO MEAS - RV MAX PG: 2.2 MMHG
BH CV ECHO MEAS - RV MEAN PG: 1.1 MMHG
BH CV ECHO MEAS - RV V1 MAX: 74.7 CM/SEC
BH CV ECHO MEAS - RV V1 MEAN: 49.6 CM/SEC
BH CV ECHO MEAS - RV V1 VTI: 15.7 CM
BH CV ECHO MEAS - RVOT AREA: 2.7 CM^2
BH CV ECHO MEAS - RVOT DIAM: 1.8 CM
BH CV ECHO MEAS - RVSP: 21 MMHG
BH CV ECHO MEAS - SI(AO): 94.8 ML/M^2
BH CV ECHO MEAS - SI(CUBED): 26.3 ML/M^2
BH CV ECHO MEAS - SI(LVOT): 30.3 ML/M^2
BH CV ECHO MEAS - SI(MOD-SP2): 29.5 ML/M^2
BH CV ECHO MEAS - SI(MOD-SP4): 23.6 ML/M^2
BH CV ECHO MEAS - SI(TEICH): 25.7 ML/M^2
BH CV ECHO MEAS - SV(AO): 192.5 ML
BH CV ECHO MEAS - SV(CUBED): 53.5 ML
BH CV ECHO MEAS - SV(LVOT): 61.6 ML
BH CV ECHO MEAS - SV(MOD-SP2): 60 ML
BH CV ECHO MEAS - SV(MOD-SP4): 48 ML
BH CV ECHO MEAS - SV(RVOT): 42.1 ML
BH CV ECHO MEAS - SV(TEICH): 52.2 ML
BH CV ECHO MEAS - TR MAX VEL: 213.9 CM/SEC
BH CV ECHO MEASUREMENTS AVERAGE E/E' RATIO: 15.7
BH CV VAS BP LEFT ARM: NORMAL MMHG
BH CV XLRA - RV BASE: 3.1 CM
BH CV XLRA - RV LENGTH: 6.9 CM
BH CV XLRA - RV MID: 2.3 CM
BH CV XLRA - TDI S': 17.2 CM/SEC
BUN SERPL-MCNC: 13 MG/DL (ref 8–23)
BUN/CREAT SERPL: 16 (ref 7–25)
CALCIUM SPEC-SCNC: 8.5 MG/DL (ref 8.6–10.5)
CHLORIDE SERPL-SCNC: 95 MMOL/L (ref 98–107)
CO2 SERPL-SCNC: 33.1 MMOL/L (ref 22–29)
CREAT SERPL-MCNC: 0.81 MG/DL (ref 0.57–1)
DEPRECATED RDW RBC AUTO: 39.7 FL (ref 37–54)
EOSINOPHIL # BLD AUTO: 0.27 10*3/MM3 (ref 0–0.4)
EOSINOPHIL NFR BLD AUTO: 3.2 % (ref 0.3–6.2)
ERYTHROCYTE [DISTWIDTH] IN BLOOD BY AUTOMATED COUNT: 12.9 % (ref 12.3–15.4)
FERRITIN SERPL-MCNC: 22.2 NG/ML (ref 13–150)
GFR SERPL CREATININE-BSD FRML MDRD: 68 ML/MIN/1.73
GLUCOSE SERPL-MCNC: 111 MG/DL (ref 65–99)
HCT VFR BLD AUTO: 33.6 % (ref 34–46.6)
HGB BLD-MCNC: 11.3 G/DL (ref 12–15.9)
IMM GRANULOCYTES # BLD AUTO: 0.03 10*3/MM3 (ref 0–0.05)
IMM GRANULOCYTES NFR BLD AUTO: 0.4 % (ref 0–0.5)
IRON 24H UR-MRATE: 44 MCG/DL (ref 37–145)
IRON SATN MFR SERPL: 14 % (ref 20–50)
LEFT ATRIUM VOLUME INDEX: 31 ML/M2
LEFT ATRIUM VOLUME: 59 CM3
LV EF 2D ECHO EST: 71 %
LYMPHOCYTES # BLD AUTO: 1.52 10*3/MM3 (ref 0.7–3.1)
LYMPHOCYTES NFR BLD AUTO: 18.3 % (ref 19.6–45.3)
MAXIMAL PREDICTED HEART RATE: 142 BPM
MCH RBC QN AUTO: 28.8 PG (ref 26.6–33)
MCHC RBC AUTO-ENTMCNC: 33.6 G/DL (ref 31.5–35.7)
MCV RBC AUTO: 85.5 FL (ref 79–97)
MONOCYTES # BLD AUTO: 0.83 10*3/MM3 (ref 0.1–0.9)
MONOCYTES NFR BLD AUTO: 10 % (ref 5–12)
NEUTROPHILS NFR BLD AUTO: 5.59 10*3/MM3 (ref 1.7–7)
NEUTROPHILS NFR BLD AUTO: 67.3 % (ref 42.7–76)
NRBC BLD AUTO-RTO: 0 /100 WBC (ref 0–0.2)
PLATELET # BLD AUTO: 291 10*3/MM3 (ref 140–450)
PMV BLD AUTO: 10.9 FL (ref 6–12)
POTASSIUM SERPL-SCNC: 3.1 MMOL/L (ref 3.5–5.2)
POTASSIUM SERPL-SCNC: 4.4 MMOL/L (ref 3.5–5.2)
RBC # BLD AUTO: 3.93 10*6/MM3 (ref 3.77–5.28)
SODIUM SERPL-SCNC: 139 MMOL/L (ref 136–145)
STRESS TARGET HR: 121 BPM
TIBC SERPL-MCNC: 310 MCG/DL (ref 298–536)
TRANSFERRIN SERPL-MCNC: 208 MG/DL (ref 200–360)
WBC # BLD AUTO: 8.31 10*3/MM3 (ref 3.4–10.8)

## 2020-08-18 PROCEDURE — 93306 TTE W/DOPPLER COMPLETE: CPT | Performed by: INTERNAL MEDICINE

## 2020-08-18 PROCEDURE — 84132 ASSAY OF SERUM POTASSIUM: CPT | Performed by: INTERNAL MEDICINE

## 2020-08-18 PROCEDURE — 99222 1ST HOSP IP/OBS MODERATE 55: CPT | Performed by: NURSE PRACTITIONER

## 2020-08-18 PROCEDURE — 84466 ASSAY OF TRANSFERRIN: CPT | Performed by: INTERNAL MEDICINE

## 2020-08-18 PROCEDURE — 71046 X-RAY EXAM CHEST 2 VIEWS: CPT

## 2020-08-18 PROCEDURE — 85025 COMPLETE CBC W/AUTO DIFF WBC: CPT | Performed by: INTERNAL MEDICINE

## 2020-08-18 PROCEDURE — 82728 ASSAY OF FERRITIN: CPT | Performed by: INTERNAL MEDICINE

## 2020-08-18 PROCEDURE — 99232 SBSQ HOSP IP/OBS MODERATE 35: CPT | Performed by: INTERNAL MEDICINE

## 2020-08-18 PROCEDURE — 83540 ASSAY OF IRON: CPT | Performed by: INTERNAL MEDICINE

## 2020-08-18 PROCEDURE — 25010000002 FUROSEMIDE PER 20 MG: Performed by: HOSPITALIST

## 2020-08-18 PROCEDURE — 93306 TTE W/DOPPLER COMPLETE: CPT

## 2020-08-18 PROCEDURE — 80048 BASIC METABOLIC PNL TOTAL CA: CPT | Performed by: INTERNAL MEDICINE

## 2020-08-18 RX ORDER — POTASSIUM CHLORIDE 1.5 G/1.77G
40 POWDER, FOR SOLUTION ORAL AS NEEDED
Status: DISCONTINUED | OUTPATIENT
Start: 2020-08-18 | End: 2020-08-20 | Stop reason: HOSPADM

## 2020-08-18 RX ORDER — POTASSIUM CHLORIDE 750 MG/1
40 CAPSULE, EXTENDED RELEASE ORAL AS NEEDED
Status: DISCONTINUED | OUTPATIENT
Start: 2020-08-18 | End: 2020-08-20 | Stop reason: HOSPADM

## 2020-08-18 RX ADMIN — FUROSEMIDE 40 MG: 10 INJECTION, SOLUTION INTRAMUSCULAR; INTRAVENOUS at 18:27

## 2020-08-18 RX ADMIN — PANTOPRAZOLE SODIUM 40 MG: 40 TABLET, DELAYED RELEASE ORAL at 06:10

## 2020-08-18 RX ADMIN — POTASSIUM CHLORIDE 40 MEQ: 10 CAPSULE, COATED, EXTENDED RELEASE ORAL at 09:39

## 2020-08-18 RX ADMIN — POTASSIUM CHLORIDE 40 MEQ: 10 CAPSULE, COATED, EXTENDED RELEASE ORAL at 15:04

## 2020-08-18 RX ADMIN — FUROSEMIDE 40 MG: 10 INJECTION, SOLUTION INTRAMUSCULAR; INTRAVENOUS at 06:11

## 2020-08-18 RX ADMIN — POTASSIUM CHLORIDE 40 MEQ: 10 CAPSULE, COATED, EXTENDED RELEASE ORAL at 18:27

## 2020-08-18 RX ADMIN — SODIUM CHLORIDE, PRESERVATIVE FREE 10 ML: 5 INJECTION INTRAVENOUS at 20:06

## 2020-08-18 RX ADMIN — CARVEDILOL 25 MG: 25 TABLET, FILM COATED ORAL at 09:39

## 2020-08-18 RX ADMIN — SERTRALINE 50 MG: 50 TABLET, FILM COATED ORAL at 09:39

## 2020-08-18 RX ADMIN — APIXABAN 5 MG: 5 TABLET, FILM COATED ORAL at 09:39

## 2020-08-18 RX ADMIN — DIGOXIN 125 MCG: 125 TABLET ORAL at 09:39

## 2020-08-18 RX ADMIN — APIXABAN 5 MG: 5 TABLET, FILM COATED ORAL at 20:06

## 2020-08-18 RX ADMIN — ATORVASTATIN CALCIUM 40 MG: 20 TABLET, FILM COATED ORAL at 09:39

## 2020-08-18 RX ADMIN — SODIUM CHLORIDE, PRESERVATIVE FREE 10 ML: 5 INJECTION INTRAVENOUS at 09:40

## 2020-08-18 RX ADMIN — LEVOTHYROXINE SODIUM 25 MCG: 25 TABLET ORAL at 09:39

## 2020-08-18 NOTE — PLAN OF CARE
Patient resting in bed with daughter at bedside. VSS, evening coreg held due to low bp. Pt remains on 1L O2, has c/o mild SOA while ambulating to bathroom. Possibly changing IV lasix to PO, may DC tomorrow. Will continue to monitor.

## 2020-08-18 NOTE — CONSULTS
Date of Hospital Visit: [unfilled]ENC@  Encounter Provider: ESTEVAN Reza  Place of Service: [facility] CARDIOLOGY  Patient Name: Jordana Hdz  :1940  Referral Provider: Spencer Valenzuela MD    Chief complaint : dyspnea    History of Present Illness Jordana Coates is a 79 year old pt of Dr. Pérez with a history of HTN, HLD, and atrial fib/flutter.    In 2018 she presented and was in atrial fibrillation with a heart rate of 115. Carvedilol was increased to 12.5 mg twice daily and she was to start Eliquis. At her 1 month follow up she was still having increased shortness of breath with activity but no palpitations. Her HR was 112 and her coreg was increased to 25 mg BID. Dr. Aguero discussed rate versus rhythm control strategy. 24-hour Holter completed 10/2/2018 showed average heart rate 92.  She did not want cardioversion so rate control and anticoagulation were continued.     In 2019 pt presented with worsening shortness of breath. BNP was WNL, CXR showed  minimal left pleural effusion and basilar atelectasis. She received IV Lasix 40 mg x 1 and was started on by mouth Lasix and potassium replacement.     In May 2020 she had heart rates in 130 she was started on digoxin 125 mcg daily as her blood pressure was on the low side.     Pt was last seen via telehealth visit by Vimla on 20. Her BP was as low as 90 systolic and HR was 70 at rest. She had more energy and denied any real dizziness or lightheadedness. She denied chest pain or  shortness of breath. She was having indigestion but went away with release of gas. Dig level and BMP were ordered.. No changes made at visit.     She has been in discussions with Dr. Pérez due to a low level of constant nausea, no vomiting. She stopped her digoxin for 6 days but did not see much change in symptoms so she restarted digoxin about 2 weeks ago. While off digoxin, she reports her phone sarah showed HR  90's. Will on digoxin, HR 70's.     Pt presented to ER on 8/16/20 with complaints of progressive dyspnea over the last few weeks. Pt having orthopnea and dyspnea on exertion. No chest pain or pressure. In ER respiratory panel negative, BMP unremarkable, procal WNL, troponin negative, BNP 1631, Digoxin level WNL, MG 1.3, HGB 10.3, INR 1.43, Lactate WNL, UA negative, CXR showed moderate to large left pleural effusion which has increased in size , likey layering of pleural fluid collection on the right and left lower lobe atelectaisis/infiltrate, EKG showed AFIB 80, hemoccult positive stool.     She has been treated with IV lasix. Moderate to large left pleural effusion on CXR. Increased since prior exam. She is feeling better today however remains on IV lasix.     She has not gained any weight recently, however she has not been eating well due to decreased appetite.     ECHO 7/31/2018    · Left ventricular systolic function is normal. Calculated EF = 61%.  · Normal right ventricular cavity size and systolic function noted.  · Left atrial cavity size is moderately dilated.  · Right atrial cavity size is moderately dilated.  · Calculated right ventricular systolic pressure from tricuspid regurgitation is 26 mmHg.  · There is no evidence of pericardial effusion.       Holter 10/2/2018    · A relatively benign monitor study.      Past Medical History:   Diagnosis Date   • Acute congestive heart failure (CMS/HCC)    • Atrial fibrillation (CMS/HCC)    • Atrial flutter (CMS/HCC)    • Disease of thyroid gland    • GERD (gastroesophageal reflux disease)    • Hyperlipidemia    • Hypertension        Past Surgical History:   Procedure Laterality Date   • CATARACT EXTRACTION     • TUBAL ABDOMINAL LIGATION         Medications Prior to Admission   Medication Sig Dispense Refill Last Dose   • apixaban (ELIQUIS) 5 MG tablet tablet Take 5 mg by mouth 2 (Two) Times a Day.   8/16/2020 at Unknown time   • atorvastatin (LIPITOR) 40 MG  tablet Take 40 mg by mouth Daily.   2020 at Unknown time   • carvedilol (COREG) 25 MG tablet Take 25 mg by mouth 2 (Two) Times a Day With Meals.   2020 at Unknown time   • digoxin (LANOXIN) 125 MCG tablet Take 1 tablet by mouth Daily. 30 tablet 11 2020 at Unknown time   • furosemide (LASIX) 20 MG tablet Take 20 mg by mouth Daily.   2020 at Unknown time   • levothyroxine (SYNTHROID, LEVOTHROID) 25 MCG tablet Take 25 mcg by mouth Daily.   2020 at Unknown time   • omeprazole (priLOSEC) 20 MG capsule Take 20 mg by mouth Daily.   8/15/2020 at Unknown time   • sertraline (ZOLOFT) 50 MG tablet Take  by mouth daily.   8/15/2020 at Unknown time   • cetirizine (zyrTEC) 10 MG tablet Take 10 mg by mouth Daily As Needed.   Unknown at Unknown time       Current Meds  Scheduled Meds:    apixaban 5 mg Oral Q12H   atorvastatin 40 mg Oral Daily   carvedilol 25 mg Oral BID With Meals   digoxin 125 mcg Oral Daily   furosemide 40 mg Intravenous Q12H   levothyroxine 25 mcg Oral Daily   pantoprazole 40 mg Oral QAM   sertraline 50 mg Oral Daily   sodium chloride 10 mL Intravenous Q12H     Continuous Infusions:   PRN Meds:.•  acetaminophen **OR** acetaminophen **OR** acetaminophen  •  nitroglycerin  •  ondansetron  •  potassium chloride  •  potassium chloride  •  sodium chloride    Allergies as of 2020   • (No Known Allergies)       Social History     Socioeconomic History   • Marital status:      Spouse name: Not on file   • Number of children: Not on file   • Years of education: Not on file   • Highest education level: Not on file   Tobacco Use   • Smoking status: Former Smoker     Packs/day: 1.00     Years: 15.00     Pack years: 15.00     Types: Cigarettes     Last attempt to quit: 1976     Years since quittin.6   • Smokeless tobacco: Never Used   • Tobacco comment: caffeine 1-2 cups coffee daily   Substance and Sexual Activity   • Alcohol use: Yes     Comment: rarely   • Drug use: No   •  "Sexual activity: Defer   Lifestyle   • Physical activity:     Days per week: 0 days     Minutes per session: 0 min   • Stress: To some extent       Family History   Problem Relation Age of Onset   • Heart attack Father    • Stroke Father    • Hypertension Father    • Aneurysm Father         brain   • Heart disease Father    • Diabetes Paternal Aunt        Review of Systems   Constitution: Positive for decreased appetite and malaise/fatigue. Negative for weight gain and weight loss.   Cardiovascular: Positive for dyspnea on exertion, orthopnea and paroxysmal nocturnal dyspnea. Negative for palpitations and syncope.   Respiratory: Positive for shortness of breath.    Gastrointestinal: Positive for nausea. Negative for vomiting.   All other systems reviewed and are negative.           Objective:   Temp:  [97.4 °F (36.3 °C)-97.9 °F (36.6 °C)] 97.9 °F (36.6 °C)  Heart Rate:  [74-87] 79  Resp:  [18] 18  BP: ()/(44-78) 136/67  Body mass index is 29.95 kg/m².  Flowsheet Rows      First Filed Value   Admission Height  172.7 cm (68\") Documented at 08/16/2020 1604   Admission Weight  88.5 kg (195 lb) Documented at 08/16/2020 1604        Vitals:    08/18/20 0723   BP: 136/67   Pulse: 79   Resp: 18   Temp: 97.9 °F (36.6 °C)   SpO2: 94%       Physical Exam   Constitutional: She is oriented to person, place, and time. She appears well-developed and well-nourished.   HENT:   Head: Normocephalic and atraumatic.   Eyes: EOM are normal.   Neck: No JVD present.   Cardiovascular: Normal rate, S1 normal and S2 normal. An irregular rhythm present.   No murmur heard.  Pulmonary/Chest: Effort normal and breath sounds normal.   Abdominal: Soft. Bowel sounds are normal. There is no tenderness.   Musculoskeletal: Normal range of motion. She exhibits no edema.   Trace edema   Neurological: She is alert and oriented to person, place, and time.   Skin: Skin is warm and dry.   Psychiatric: She has a normal mood and affect. Her behavior is " normal. Judgment and thought content normal.               Lab Review:      Results from last 7 days   Lab Units 08/18/20  0626  08/16/20  1657   SODIUM mmol/L 139   < > 139   POTASSIUM mmol/L 3.1*   < > 3.9   CHLORIDE mmol/L 95*   < > 102   CO2 mmol/L 33.1*   < > 27.4   BUN mg/dL 13   < > 8   CREATININE mg/dL 0.81   < > 0.65   CALCIUM mg/dL 8.5*   < > 8.2*   BILIRUBIN mg/dL  --   --  0.6   ALK PHOS U/L  --   --  101   ALT (SGPT) U/L  --   --  16   AST (SGOT) U/L  --   --  19   GLUCOSE mg/dL 111*   < > 142*    < > = values in this interval not displayed.     Results from last 7 days   Lab Units 08/16/20  1657   TROPONIN T ng/mL <0.010     @[id number]@  Results from last 7 days   Lab Units 08/18/20  0626 08/17/20  0615 08/17/20  0054 08/16/20  1657   WBC 10*3/mm3 8.31 8.15  --  6.73   HEMOGLOBIN g/dL 11.3* 10.3* 10.9* 10.3*   HEMATOCRIT % 33.6* 32.2* 33.1* 31.2*   PLATELETS 10*3/mm3 291 252  --  260     Results from last 7 days   Lab Units 08/16/20  1659   INR  1.43*     Results from last 7 days   Lab Units 08/17/20  0615   MAGNESIUM mg/dL 1.9                 I personally viewed and interpreted the patient's EKG/Telemetry data  )  Patient Active Problem List   Diagnosis   • Dyspnea on exertion   • Atrial fibrillation (CMS/[facility])   • Hypothyroidism (acquired)   • GERD (gastroesophageal reflux disease)   • HLD (hyperlipidemia)   • HTN (hypertension)   • Pleural effusion on left   • Anemia   • Hypomagnesemia   • GI bleed   • Chronic diastolic CHF (congestive heart failure) (CMS/[facility])     Assessment and Plan:  1. Acute on chronic diastolic heart failure - likely dietary indiscretions as biggest contributing factor. She admits to frequent use of table salt on potatoes, tomatoes, etc. Digoxin was held recently, did she have poorly controlled HR contributing? She has a phone sarah that reported controlled heart rates. Continue IV diuresis.   2. Moderate to large pleural effusion - repeat CXR in AM to assess progress. pulm thinks  we can avoid thoracentesis.   3. Persistent atrial fibrillation - continue rate control and anticoagulation. She is on apixaban.   4. Hypertension - likely accelerated by volume overload and increased sodium intake. It was very elevated in the emergency room, now improved. In general, her blood pressure is well controlled at home. Will follow.   5. Heme + stools on anticoagulation - Hgb is stable. Planning for outpatient upper and lower endoscopy.   6. Suspected sleep apnea - she has expressed no desire to be tested however may help with treatment of #1.  7. Hypothyroidism - TSH stable in June.  8. Decreased appetite without weight loss.      Echocardiogram pretty unremarkable. Diet and Na restriction discussed. Continue Iv lasix and likely change to oral soon. Repeat CXR in AM.     Soraya Eli, ESTEVAN  08/18/20  10:41.

## 2020-08-18 NOTE — PLAN OF CARE
Problem: Patient Care Overview  Goal: Plan of Care Review  Outcome: Ongoing (interventions implemented as appropriate)  Flowsheets (Taken 8/18/2020 1714)  Progress: improving  Plan of Care Reviewed With: patient  Outcome Summary: VSS. Pt was on RA yday but had to be placed back on 1 L tonight for sats slightly dropping. Pt has not c/o SOA at all, though sats tend to drop after walking to BR. Pt up ad carlos to BR. On IV lasix. No c/o pain. Pt has slept well. Plan is for echo today, GI OP f/u, and possible DC home today if pt can be weaned to RA and lasix switched to  PO. Will CTM.

## 2020-08-18 NOTE — PROGRESS NOTES
Name: Jordana Hdz ADMIT: 2020   : 1940  PCP: Olive Claire MD    MRN: [id number] LOS: 2 days   AGE/SEX: 79 y.o. female  ROOM: [facility]     Subjective   Subjective   Patient is lying on the bed in no major distress.  Denies nausea, vomiting abdominal pain, chest pain.  Shortness of breath has been improving.       Objective   Objective   Vital Signs  Temp:  [97.9 °F (36.6 °C)] 97.9 °F (36.6 °C)  Heart Rate:  [72-87] 72  Resp:  [18-20] 20  BP: (106-162)/(52-78) 106/52  SpO2:  [85 %-96 %] 94 %  on  Flow (L/min):  [1] 1;   Device (Oxygen Therapy): room air  Body mass index is 29.95 kg/m².  Physical Exam    HEENT:  Atraumatic, normocephalic.  PERRLA.  Extraocular movements intact.  Conjunctivae pink.  Sclerae, no icterus.  Mucous membranes dry.  Oropharynx is  clear.  NECK:  Supple.  No JVD.  HEART:  Irregular  rhythm.  Normal S1, S2.   LUNGS:  Fairly clear to auscultation anteriorly.  No wheezes.  No crackles.  ABDOMEN:   Soft, nontender.  Bowel sounds present.  No rebound.  No  guarding.  EXTREMITIES:  No cyanosis, clubbing, or edema.  Palpable pedal pulses.  NEURO:  Grossly nonfocal.  No facial asymmetry.  Good strength in all 4  extremities.  SKIN:  Warm and dry.  No evidence of rashes.  LYMPH NODES:  No palpable cervical or supraclavicular lymphadenopathy.    Results Review:       I reviewed the patient's new clinical results.  Results from last 7 days   Lab Units 20  0626 20  0615 20  0054 20  1657   WBC 10*3/mm3 8.31 8.15  --  6.73   HEMOGLOBIN g/dL 11.3* 10.3* 10.9* 10.3*   PLATELETS 10*3/mm3 291 252  --  260     Results from last 7 days   Lab Units 20  0626 20  0615 20  1657   SODIUM mmol/L 139 140 139   POTASSIUM mmol/L 3.1* 3.6 3.9   CHLORIDE mmol/L 95* 98 102   CO2 mmol/L 33.1* 27.6 27.4   BUN mg/dL 13 7* 8   CREATININE mg/dL 0.81 0.65 0.65   GLUCOSE mg/dL 111* 101* 142*   Estimated Creatinine Clearance: 67 mL/min (by C-G formula based on SCr  of 0.81 mg/dL).  Results from last 7 days   Lab Units 08/16/20  1657   ALBUMIN g/dL 3.40*   BILIRUBIN mg/dL 0.6   ALK PHOS U/L 101   AST (SGOT) U/L 19   ALT (SGPT) U/L 16     Results from last 7 days   Lab Units 08/18/20  0626 08/17/20  0615 08/16/20  1657   CALCIUM mg/dL 8.5* 8.3* 8.2*   ALBUMIN g/dL  --   --  3.40*   MAGNESIUM mg/dL  --  1.9 1.3*     Results from last 7 days   Lab Units 08/16/20  1659 08/16/20  1657   PROCALCITONIN ng/mL  --  0.06   LACTATE mmol/L 1.2  --      COVID19   Date Value Ref Range Status   08/16/2020 Not Detected Not Detected - Ref. Range Final     No results found for: HGBA1C, POCGLU    XR Chest 2 View  TWO-VIEW CHEST     HISTORY: Follow-up of left pleural effusion.     FINDINGS: There is a moderate left pleural effusion showing very minimal  decrease in size since a study of 2 days ago. There is some adjacent  atelectasis and the lungs are otherwise clear. The heart remains mildly  enlarged.     This report was finalized on 8/18/2020 3:20 PM by Dr. Rafael Pastor M.D.     Adult Transthoracic Echo Complete W/ Cont if Necessary Per Protocol  · Left ventricular systolic function is hyperdynamic (EF > 70%).   Calculated EF = 71.1%. Estimated EF was in agreement with the calculated   EF. Estimated EF = 71%. Normal left ventricular cavity size and wall   thickness noted. All left ventricular wall segments contract normally.   Left ventricular diastolic function was indeterminate.  · Left atrial volume is borderline increased.  · There is mild thickening of the aortic valve. No aortic valve   regurgitation is present. No aortic valve stenosis is present.  · Trace mitral valve regurgitation is present.  · Physiologic tricuspid valve regurgitation is present. Estimated right   ventricular systolic pressure from tricuspid regurgitation is normal (<35   mmHg). Calculated right ventricular systolic pressure from tricuspid   regurgitation is 21 mmHg.           apixaban 5 mg Oral Q12H    atorvastatin 40 mg Oral Daily   carvedilol 25 mg Oral BID With Meals   digoxin 125 mcg Oral Daily   furosemide 40 mg Intravenous Q12H   levothyroxine 25 mcg Oral Daily   pantoprazole 40 mg Oral QAM   sertraline 50 mg Oral Daily   sodium chloride 10 mL Intravenous Q12H      Diet Regular       Assessment/Plan     Active Hospital Problems    Diagnosis  POA   • **Dyspnea on exertion [R06.00]  Yes   • Atrial fibrillation (CMS/HCC) [I48.91]  Yes   • Hypothyroidism (acquired) [E03.9]  Yes   • GERD (gastroesophageal reflux disease) [K21.9]  Yes   • HLD (hyperlipidemia) [E78.5]  Yes   • HTN (hypertension) [I10]  Yes   • Pleural effusion on left [J90]  Yes   • Anemia [D64.9]  Yes   • Hypomagnesemia [E83.42]  Yes   • GI bleed [K92.2]  Yes   • Chronic diastolic CHF (congestive heart failure) (CMS/HCC) [I50.32]  Yes      Resolved Hospital Problems   No resolved problems to display.       1. Acute hypoxic respiratory failure secondary to moderate to large left-sided pleural effusion, this is most likely secondary to acute diastolic heart failure and patient is currently on IV Lasix.  Echo revealed a normal ejection fraction.  Pulmonary did evaluate and no need for thoracentesis at this point.  2. Anemia, occult blood is positive and GI consult has been obtained.   No obvious GI bleed.  3. History of atrial fibrillation, currently patient is rate controlled.  Continue with Eliquis, Coreg and digoxin.    4. Hypothyroidism, on Synthroid.    5. History of hypertension, blood pressure is reasonably well controlled and will continue with Coreg.    6. Hyperlipidemia, on statins.    7. Hypomagnesemia, magnesium is being replaced.  8. On Eliquis, no need for Lovenox for DVT prophylaxis.    9. Code status is full code.    Jc Hernandez MD  [facility]  08/18/20  18:47

## 2020-08-18 NOTE — PROGRESS NOTES
[facility] INPATIENT PROGRESS NOTE         [address]    2020      PATIENT IDENTIFICATION:  Name: Jordana Hdz ADMIT: 2020   : 1940  PCP: Olive Claire MD    MRN: [id number] LOS: 2 days   AGE/SEX: 79 y.o. female  ROOM: [facility]                     LOS 2    Reason for visit: Follow-up pleural effusion and respiratory failure      SUBJECTIVE:      Getting cardiac testing today.  No new issues overnight.    Objective   OBJECTIVE:    Vital Sign Min/Max for last 24 hours  Temp  Min: 97.4 °F (36.3 °C)  Max: 97.9 °F (36.6 °C)   BP  Min: 86/44  Max: 162/78   Pulse  Min: 74  Max: 79   Resp  Min: 18  Max: 18   SpO2  Min: 91 %  Max: 96 %   No data recorded   Weight  Min: 89.4 kg (197 lb)  Max: 89.4 kg (197 lb)                         Body mass index is 29.95 kg/m².    Intake/Output Summary (Last 24 hours) at 2020 0946  Last data filed at 2020 0900  Gross per 24 hour   Intake 860 ml   Output 1750 ml   Net -890 ml         Exam:  GEN:  No distress, appears stated age  EYES:   PERRL, anicteric sclera  ENT:    External ears/nose normal, OP clear  NECK:  No adenopathy, midline trachea  LUNGS: Normal chest on inspection, palpation and auscultation  CV:  Normal S1S2, without murmur  ABD:  Non tender, non distended, no hepatosplenomegaly, +BS  EXT:  No edema, cyanosis or clubbing    Scheduled meds:    apixaban 5 mg Oral Q12H   atorvastatin 40 mg Oral Daily   carvedilol 25 mg Oral BID With Meals   digoxin 125 mcg Oral Daily   furosemide 40 mg Intravenous Q12H   levothyroxine 25 mcg Oral Daily   pantoprazole 40 mg Oral QAM   sertraline 50 mg Oral Daily   sodium chloride 10 mL Intravenous Q12H     IV meds:                         Data Review:  Results from last 7 days   Lab Units 20  0626 20  0615 20  1657   SODIUM mmol/L 139 140 139   POTASSIUM mmol/L 3.1* 3.6 3.9   CHLORIDE mmol/L 95* 98 102   CO2 mmol/L 33.1* 27.6 27.4   BUN mg/dL 13 7* 8   CREATININE mg/dL 0.81  0.65 0.65   GLUCOSE mg/dL 111* 101* 142*   CALCIUM mg/dL 8.5* 8.3* 8.2*         Estimated Creatinine Clearance: 67 mL/min (by C-G formula based on SCr of 0.81 mg/dL).  Results from last 7 days   Lab Units 08/18/20  0626 08/17/20  0615 08/17/20  0054 08/16/20  1657   WBC 10*3/mm3 8.31 8.15  --  6.73   HEMOGLOBIN g/dL 11.3* 10.3* 10.9* 10.3*   PLATELETS 10*3/mm3 291 252  --  260     Results from last 7 days   Lab Units 08/16/20  1659   INR  1.43*     Results from last 7 days   Lab Units 08/16/20  1657   ALT (SGPT) U/L 16   AST (SGOT) U/L 19         Results from last 7 days   Lab Units 08/16/20  1659 08/16/20  1657   PROCALCITONIN ng/mL  --  0.06   LACTATE mmol/L 1.2  --            Chest x-ray 8/16/2020 reviewed    Microbiology reviewed  )        Active Hospital Problems    Diagnosis  POA   • **Dyspnea on exertion [R06.00]  Yes   • Atrial fibrillation (CMS/HCC) [I48.91]  Yes   • Hypothyroidism (acquired) [E03.9]  Yes   • GERD (gastroesophageal reflux disease) [K21.9]  Yes   • HLD (hyperlipidemia) [E78.5]  Yes   • HTN (hypertension) [I10]  Yes   • Pleural effusion on left [J90]  Yes   • Anemia [D64.9]  Yes   • Hypomagnesemia [E83.42]  Yes   • GI bleed [K92.2]  Yes   • Chronic diastolic CHF (congestive heart failure) (CMS/HCC) [I50.32]  Yes      Resolved Hospital Problems   No resolved problems to display.       Assessment   ASSESSMENT:  Acute hypoxemic respiratory failure due to moderate left pleural effusion  Acute on chronic diastolic CHF  Chronic atrial fibrillation  Hypothyroidism  Former smoker quit 1976    PLAN:    On diuretics.  2D echo this morning pending.  I think we will be able to avoid thoracentesis needed.      Mo Villalobos MD  Pulmonary and Critical Care Medicine  [facility], [facility]  8/18/2020    09:46

## 2020-08-18 NOTE — PROGRESS NOTES
"   LOS: 2 days   Patient Care Team:  Olive Claire MD as PCP - General    Chief Complaint: anemia     Subjective     History of Present Illness  She states that she is feeling and breathing better.  Subjective:  Symptoms:  Improved.    Diet:  Adequate intake.    Activity level: Normal.    Pain:  She reports no pain.      Still short of air but better.   History taken from: patient chart    Objective     Vital Signs  Temp:  [97.4 °F (36.3 °C)-97.9 °F (36.6 °C)] 97.9 °F (36.6 °C)  Heart Rate:  [74-79] 79  Resp:  [18] 18  BP: ()/(44-78) 136/67    Objective:  General Appearance:  Comfortable and ill-appearing.    Vital signs: (most recent): Blood pressure 136/67, pulse 79, temperature 97.9 °F (36.6 °C), temperature source Oral, resp. rate 18, height 172.7 cm (68\"), weight 89.4 kg (197 lb), SpO2 94 %.    Lungs:  There are decreased breath sounds.    Heart: Normal rate.    Abdomen: Abdomen is soft.  There is no abdominal tenderness.               Results Review:     I reviewed the patient's new clinical results.    Medication Review: done    Assessment/Plan       Dyspnea on exertion    Atrial fibrillation (CMS/HCC)    Hypothyroidism (acquired)    GERD (gastroesophageal reflux disease)    HLD (hyperlipidemia)    HTN (hypertension)    Pleural effusion on left    Anemia    Hypomagnesemia    GI bleed    Chronic diastolic CHF (congestive heart failure) (CMS/HCC)      Assessment & Plan  Heme + stools  Normocytic anemia with normal reticulocyte count  Iron deficiency   Personal history of colon polyps  Heart failure  Pleural effusions     Discussed with patient at length  Plan elective outpatient upper and lower endoscopy,  Given she is being diuresed. I would hope the pleural effusions would be improved to ensure more safe procedure.  This will be done as outpatient in the future.     Jared Cooper MD  08/18/20  07:51    Time: Critical care 20 min      "

## 2020-08-19 ENCOUNTER — APPOINTMENT (OUTPATIENT)
Dept: GENERAL RADIOLOGY | Facility: HOSPITAL | Age: 80
End: 2020-08-19

## 2020-08-19 LAB
ANION GAP SERPL CALCULATED.3IONS-SCNC: 9.2 MMOL/L (ref 5–15)
BASOPHILS # BLD AUTO: 0.08 10*3/MM3 (ref 0–0.2)
BASOPHILS NFR BLD AUTO: 1 % (ref 0–1.5)
BUN SERPL-MCNC: 15 MG/DL (ref 8–23)
BUN/CREAT SERPL: 19.7 (ref 7–25)
CALCIUM SPEC-SCNC: 8.3 MG/DL (ref 8.6–10.5)
CHLORIDE SERPL-SCNC: 99 MMOL/L (ref 98–107)
CO2 SERPL-SCNC: 32.8 MMOL/L (ref 22–29)
CREAT SERPL-MCNC: 0.76 MG/DL (ref 0.57–1)
DEPRECATED RDW RBC AUTO: 41.9 FL (ref 37–54)
EOSINOPHIL # BLD AUTO: 0.27 10*3/MM3 (ref 0–0.4)
EOSINOPHIL NFR BLD AUTO: 3.2 % (ref 0.3–6.2)
ERYTHROCYTE [DISTWIDTH] IN BLOOD BY AUTOMATED COUNT: 12.8 % (ref 12.3–15.4)
GFR SERPL CREATININE-BSD FRML MDRD: 74 ML/MIN/1.73
GLUCOSE SERPL-MCNC: 102 MG/DL (ref 65–99)
HCT VFR BLD AUTO: 33.1 % (ref 34–46.6)
HGB BLD-MCNC: 10.6 G/DL (ref 12–15.9)
IMM GRANULOCYTES # BLD AUTO: 0.03 10*3/MM3 (ref 0–0.05)
IMM GRANULOCYTES NFR BLD AUTO: 0.4 % (ref 0–0.5)
LYMPHOCYTES # BLD AUTO: 1.8 10*3/MM3 (ref 0.7–3.1)
LYMPHOCYTES NFR BLD AUTO: 21.5 % (ref 19.6–45.3)
MCH RBC QN AUTO: 28.5 PG (ref 26.6–33)
MCHC RBC AUTO-ENTMCNC: 32 G/DL (ref 31.5–35.7)
MCV RBC AUTO: 89 FL (ref 79–97)
MONOCYTES # BLD AUTO: 0.96 10*3/MM3 (ref 0.1–0.9)
MONOCYTES NFR BLD AUTO: 11.5 % (ref 5–12)
NEUTROPHILS NFR BLD AUTO: 5.22 10*3/MM3 (ref 1.7–7)
NEUTROPHILS NFR BLD AUTO: 62.4 % (ref 42.7–76)
NRBC BLD AUTO-RTO: 0 /100 WBC (ref 0–0.2)
PLATELET # BLD AUTO: 269 10*3/MM3 (ref 140–450)
PMV BLD AUTO: 11.2 FL (ref 6–12)
POTASSIUM SERPL-SCNC: 4.1 MMOL/L (ref 3.5–5.2)
RBC # BLD AUTO: 3.72 10*6/MM3 (ref 3.77–5.28)
SODIUM SERPL-SCNC: 141 MMOL/L (ref 136–145)
WBC # BLD AUTO: 8.36 10*3/MM3 (ref 3.4–10.8)

## 2020-08-19 PROCEDURE — 99232 SBSQ HOSP IP/OBS MODERATE 35: CPT | Performed by: INTERNAL MEDICINE

## 2020-08-19 PROCEDURE — 71046 X-RAY EXAM CHEST 2 VIEWS: CPT

## 2020-08-19 PROCEDURE — 80048 BASIC METABOLIC PNL TOTAL CA: CPT | Performed by: INTERNAL MEDICINE

## 2020-08-19 PROCEDURE — 85025 COMPLETE CBC W/AUTO DIFF WBC: CPT | Performed by: INTERNAL MEDICINE

## 2020-08-19 PROCEDURE — 25010000002 FUROSEMIDE PER 20 MG: Performed by: HOSPITALIST

## 2020-08-19 RX ADMIN — SERTRALINE 50 MG: 50 TABLET, FILM COATED ORAL at 10:44

## 2020-08-19 RX ADMIN — LEVOTHYROXINE SODIUM 25 MCG: 25 TABLET ORAL at 10:44

## 2020-08-19 RX ADMIN — CARVEDILOL 25 MG: 25 TABLET, FILM COATED ORAL at 10:44

## 2020-08-19 RX ADMIN — SODIUM CHLORIDE, PRESERVATIVE FREE 10 ML: 5 INJECTION INTRAVENOUS at 20:40

## 2020-08-19 RX ADMIN — SODIUM CHLORIDE, PRESERVATIVE FREE 10 ML: 5 INJECTION INTRAVENOUS at 12:05

## 2020-08-19 RX ADMIN — FUROSEMIDE 40 MG: 10 INJECTION, SOLUTION INTRAMUSCULAR; INTRAVENOUS at 06:50

## 2020-08-19 RX ADMIN — ATORVASTATIN CALCIUM 40 MG: 20 TABLET, FILM COATED ORAL at 10:42

## 2020-08-19 RX ADMIN — PANTOPRAZOLE SODIUM 40 MG: 40 TABLET, DELAYED RELEASE ORAL at 06:50

## 2020-08-19 RX ADMIN — CARVEDILOL 25 MG: 25 TABLET, FILM COATED ORAL at 18:53

## 2020-08-19 RX ADMIN — APIXABAN 5 MG: 5 TABLET, FILM COATED ORAL at 10:43

## 2020-08-19 RX ADMIN — APIXABAN 5 MG: 5 TABLET, FILM COATED ORAL at 20:40

## 2020-08-19 RX ADMIN — DIGOXIN 125 MCG: 125 TABLET ORAL at 10:44

## 2020-08-19 RX ADMIN — FUROSEMIDE 40 MG: 10 INJECTION, SOLUTION INTRAMUSCULAR; INTRAVENOUS at 18:53

## 2020-08-19 NOTE — PLAN OF CARE
Problem: Patient Care Overview  Goal: Plan of Care Review  Outcome: Ongoing (interventions implemented as appropriate)  Flowsheets  Taken 8/19/2020 1520  Progress: improving  Outcome Summary: No new complaints today. RN attempted to wean O2,  patient desats with activity and pt is back on 1 L. Continue to monitor.  Taken 8/19/2020 7567  Plan of Care Reviewed With: patient

## 2020-08-19 NOTE — PROGRESS NOTES
[facility] INPATIENT PROGRESS NOTE         [address]    2020      PATIENT IDENTIFICATION:  Name: Jordana Hdz ADMIT: 2020   : 1940  PCP: Olive Claire MD    MRN: [id number] LOS: 3 days   AGE/SEX: 79 y.o. female  ROOM: [facility]                     LOS 3    Reason for visit: Follow-up pleural effusion and respiratory failure      SUBJECTIVE:      Says that she feels that her breathing has improved.  She is on 1 L of supplemental oxygen with saturations 97% at time of visit.  Diminished breath sounds on auscultation left greater than right mildly at bases.  No rhonchi.  No wheezing.  2D echo result is pending.    Objective   OBJECTIVE:    Vital Sign Min/Max for last 24 hours  Temp  Min: 97.6 °F (36.4 °C)  Max: 97.9 °F (36.6 °C)   BP  Min: 106/52  Max: 144/71   Pulse  Min: 72  Max: 86   Resp  Min: 16  Max: 20   SpO2  Min: 94 %  Max: 97 %   No data recorded   No data recorded                         Body mass index is 29.95 kg/m².    Intake/Output Summary (Last 24 hours) at 2020 1033  Last data filed at 2020 0900  Gross per 24 hour   Intake 960 ml   Output 800 ml   Net 160 ml         Exam:  GEN:  No distress, appears stated age  EYES:   PERRL, anicteric sclera  ENT:    External ears/nose normal, OP clear  NECK:  No adenopathy, midline trachea  LUNGS: Normal chest on inspection, palpation and auscultation  CV:  Normal S1S2, without murmur  ABD:  Non tender, non distended, no hepatosplenomegaly, +BS  EXT:  No edema, cyanosis or clubbing    Scheduled meds:      apixaban 5 mg Oral Q12H   atorvastatin 40 mg Oral Daily   carvedilol 25 mg Oral BID With Meals   digoxin 125 mcg Oral Daily   furosemide 40 mg Intravenous Q12H   levothyroxine 25 mcg Oral Daily   pantoprazole 40 mg Oral QAM   sertraline 50 mg Oral Daily   sodium chloride 10 mL Intravenous Q12H     IV meds:                         Data Review:  Results from last 7 days   Lab Units 20  0607 20  9222  08/18/20  0626 08/17/20  0615 08/16/20  1657   SODIUM mmol/L 141  --  139 140 139   POTASSIUM mmol/L 4.1 4.4 3.1* 3.6 3.9   CHLORIDE mmol/L 99  --  95* 98 102   CO2 mmol/L 32.8*  --  33.1* 27.6 27.4   BUN mg/dL 15  --  13 7* 8   CREATININE mg/dL 0.76  --  0.81 0.65 0.65   GLUCOSE mg/dL 102*  --  111* 101* 142*   CALCIUM mg/dL 8.3*  --  8.5* 8.3* 8.2*         Estimated Creatinine Clearance: 67.8 mL/min (by C-G formula based on SCr of 0.76 mg/dL).  Results from last 7 days   Lab Units 08/19/20  0607 08/18/20  0626 08/17/20  0615 08/17/20  0054 08/16/20  1657   WBC 10*3/mm3 8.36 8.31 8.15  --  6.73   HEMOGLOBIN g/dL 10.6* 11.3* 10.3* 10.9* 10.3*   PLATELETS 10*3/mm3 269 291 252  --  260     Results from last 7 days   Lab Units 08/16/20  1659   INR  1.43*     Results from last 7 days   Lab Units 08/16/20  1657   ALT (SGPT) U/L 16   AST (SGOT) U/L 19         Results from last 7 days   Lab Units 08/16/20  1659 08/16/20  1657   PROCALCITONIN ng/mL  --  0.06   LACTATE mmol/L 1.2  --            Chest x-ray 8/16/2020 reviewed    Microbiology reviewed  )        Active Hospital Problems    Diagnosis  POA   • **Dyspnea on exertion [R06.00]  Yes   • Atrial fibrillation (CMS/HCC) [I48.91]  Yes   • Hypothyroidism (acquired) [E03.9]  Yes   • GERD (gastroesophageal reflux disease) [K21.9]  Yes   • HLD (hyperlipidemia) [E78.5]  Yes   • HTN (hypertension) [I10]  Yes   • Pleural effusion on left [J90]  Yes   • Anemia [D64.9]  Yes   • Hypomagnesemia [E83.42]  Yes   • GI bleed [K92.2]  Yes   • Chronic diastolic CHF (congestive heart failure) (CMS/HCC) [I50.32]  Yes      Resolved Hospital Problems   No resolved problems to display.       Assessment   ASSESSMENT:  Acute hypoxemic respiratory failure due to moderate left pleural effusion  Acute on chronic diastolic CHF  Chronic atrial fibrillation  Hypothyroidism  Former smoker quit 1976    PLAN:    On diuretics.  2D echo this morning pending.  I think we will be able to avoid thoracentesis  needed.  Repeat chest x-ray tomorrow for follow-up of effusion.      Mo Villalobos MD  Pulmonary and Critical Care Medicine  [facility], [facility]  8/19/2020    10:33

## 2020-08-19 NOTE — PROGRESS NOTES
Name: Jordana Hdz ADMIT: 2020   : 1940  PCP: Olive Claire MD    MRN: [id number] LOS: 3 days   AGE/SEX: 79 y.o. female  ROOM: [facility]     Subjective   Subjective   Patient is lying on the bed in no major distress.  Denies nausea, vomiting abdominal pain, chest pain.  Shortness of breath has been improving.       Objective   Objective   Vital Signs  Temp:  [97.6 °F (36.4 °C)-97.9 °F (36.6 °C)] 97.7 °F (36.5 °C)  Heart Rate:  [76-86] 84  Resp:  [16-18] 18  BP: (106-144)/(52-78) 128/61  SpO2:  [93 %-97 %] 93 %  on  Flow (L/min):  [1] 1;   Device (Oxygen Therapy): nasal cannula  Body mass index is 29.95 kg/m².  Physical Exam    HEENT:  Atraumatic, normocephalic.  PERRLA.  Extraocular movements intact.  Conjunctivae pink.  Sclerae, no icterus.  Mucous membranes dry.  Oropharynx is  clear.  NECK:  Supple.  No JVD.  HEART:  Irregular  rhythm.  Normal S1, S2.   LUNGS:  Fairly clear to auscultation anteriorly.  No wheezes.  No crackles.  ABDOMEN:   Soft, nontender.  Bowel sounds present.  No rebound.  No  guarding.  EXTREMITIES:  No cyanosis, clubbing, or edema.  Palpable pedal pulses.  NEURO:  Grossly nonfocal.  No facial asymmetry.  Good strength in all 4  extremities.  SKIN:  Warm and dry.  No evidence of rashes.  LYMPH NODES:  No palpable cervical or supraclavicular lymphadenopathy.    Results Review:       I reviewed the patient's new clinical results.  Results from last 7 days   Lab Units 20  0607 20  0626 20  0615 20  0054 20  1657   WBC 10*3/mm3 8.36 8.31 8.15  --  6.73   HEMOGLOBIN g/dL 10.6* 11.3* 10.3* 10.9* 10.3*   PLATELETS 10*3/mm3 269 291 252  --  260     Results from last 7 days   Lab Units 20  0607 20  2241 20  0626 20  0615 20  1657   SODIUM mmol/L 141  --  139 140 139   POTASSIUM mmol/L 4.1 4.4 3.1* 3.6 3.9   CHLORIDE mmol/L 99  --  95* 98 102   CO2 mmol/L 32.8*  --  33.1* 27.6 27.4   BUN mg/dL 15  --  13 7* 8    CREATININE mg/dL 0.76  --  0.81 0.65 0.65   GLUCOSE mg/dL 102*  --  111* 101* 142*   Estimated Creatinine Clearance: 67.8 mL/min (by C-G formula based on SCr of 0.76 mg/dL).  Results from last 7 days   Lab Units 08/16/20  1657   ALBUMIN g/dL 3.40*   BILIRUBIN mg/dL 0.6   ALK PHOS U/L 101   AST (SGOT) U/L 19   ALT (SGPT) U/L 16     Results from last 7 days   Lab Units 08/19/20  0607 08/18/20  0626 08/17/20  0615 08/16/20  1657   CALCIUM mg/dL 8.3* 8.5* 8.3* 8.2*   ALBUMIN g/dL  --   --   --  3.40*   MAGNESIUM mg/dL  --   --  1.9 1.3*     Results from last 7 days   Lab Units 08/16/20  1659 08/16/20  1657   PROCALCITONIN ng/mL  --  0.06   LACTATE mmol/L 1.2  --      COVID19   Date Value Ref Range Status   08/16/2020 Not Detected Not Detected - Ref. Range Final     No results found for: HGBA1C, POCGLU    XR Chest PA & Lateral  XR CHEST PA AND LATERAL-     HISTORY:  Pleural effusion.     COMPARISON:  Chest radiographs 08/18/2020.     FINDINGS:    3 views of the chest were obtained. The cardiac silhouette is enlarged,  similar to the prior study. Mediastinal and hilar contours are  unchanged. There is calcific aortic atherosclerosis. A small left  pleural effusion with mild adjacent patchy opacity has slightly  improved. There is no new focal consolidation. There is multilevel  degenerative disc disease. There is bilateral glenohumeral  osteoarthritis. There is calcific atherosclerosis of the upper abdominal  aorta.     CONCLUSION(S):       1.  Small left pleural effusion, slightly improved.     This report was finalized on 8/19/2020 1:31 PM by Dr. Dora Miner M.D.           apixaban 5 mg Oral Q12H   atorvastatin 40 mg Oral Daily   carvedilol 25 mg Oral BID With Meals   digoxin 125 mcg Oral Daily   furosemide 40 mg Intravenous Q12H   levothyroxine 25 mcg Oral Daily   pantoprazole 40 mg Oral QAM   sertraline 50 mg Oral Daily   sodium chloride 10 mL Intravenous Q12H      Diet Regular       Assessment/Plan     Active  Hospital Problems    Diagnosis  POA   • **Dyspnea on exertion [R06.00]  Yes   • Atrial fibrillation (CMS/HCC) [I48.91]  Yes   • Hypothyroidism (acquired) [E03.9]  Yes   • GERD (gastroesophageal reflux disease) [K21.9]  Yes   • HLD (hyperlipidemia) [E78.5]  Yes   • HTN (hypertension) [I10]  Yes   • Pleural effusion on left [J90]  Yes   • Anemia [D64.9]  Yes   • Hypomagnesemia [E83.42]  Yes   • GI bleed [K92.2]  Yes   • Chronic diastolic CHF (congestive heart failure) (CMS/[facility]) [I50.32]  Yes      Resolved Hospital Problems   No resolved problems to display.       1. Acute hypoxic respiratory failure secondary to moderate to large left-sided pleural effusion, this is most likely secondary to acute diastolic heart failure and patient is currently on IV Lasix.  Echo revealed a normal ejection fraction.  Pulmonary did evaluate and no need for thoracentesis at this point.Repeat chest x-ray in a.m.  2. Anemia, occult blood is positive and GI consult has been obtained.   No obvious GI bleed.  3. History of atrial fibrillation, currently patient is rate controlled.  Continue with Eliquis, Coreg and digoxin.    4. Hypothyroidism, on Synthroid.    5. History of hypertension, blood pressure is reasonably well controlled and will continue with Coreg.    6. Hyperlipidemia, on statins.    7. Hypomagnesemia, magnesium is being replaced.  8. On Eliquis, no need for Lovenox for DVT prophylaxis.    9. Code status is full code.    Jc Hernandez MD  [facility]  08/19/20  16:34

## 2020-08-19 NOTE — PLAN OF CARE
Problem: Patient Care Overview  Goal: Plan of Care Review  Outcome: Ongoing (interventions implemented as appropriate)  Flowsheets (Taken 8/19/2020 3476)  Progress: improving  Plan of Care Reviewed With: patient  Outcome Summary: No complaints or new issues overnight. Rested comfortably most of shift. Remains on 1L O2, still some mild SOA with activity but patient states is improving. VSS. Possible dc today. Will ct to monitor.

## 2020-08-19 NOTE — PROGRESS NOTES
"[facility]    Patient Name: Jordana Hdz  :1940  79 y.o.  LOS: 3  Encounter Provider: Brian Skinner Jr, MD      Patient Care Team:  Olive Claire MD as PCP - General    Chief Complaint: Follow-up acute on chronic diastolic heart failure, persistent atrial fibrillation, pleural effusion, Hemoccult positive stool    Interval History: Fairly well-controlled heart rate overnight.       Objective   Vital Signs  Temp:  [97.6 °F (36.4 °C)-97.9 °F (36.6 °C)] 97.6 °F (36.4 °C)  Heart Rate:  [72-86] 86  Resp:  [16-20] 16  BP: (106-144)/(52-78) 144/71    Intake/Output Summary (Last 24 hours) at 2020 0825  Last data filed at 2020 0504  Gross per 24 hour   Intake 838 ml   Output 1700 ml   Net -862 ml     Flowsheet Rows      First Filed Value   Admission Height  172.7 cm (68\") Documented at 2020 1604   Admission Weight  88.5 kg (195 lb) Documented at 2020 1604            Physical Exam   Constitutional: She is oriented to person, place, and time. She appears well-developed and well-nourished.   HENT:   Head: Normocephalic and atraumatic.   Eyes: Pupils are equal, round, and reactive to light. Conjunctivae are normal.   Neck: No JVD present. No thyromegaly present.   Cardiovascular: Exam reveals no gallop and no friction rub.   No murmur heard.  Irregular rhythm   Pulmonary/Chest: No respiratory distress. She exhibits no tenderness.   Decreased breath sounds right side   Abdominal: Bowel sounds are normal. She exhibits no distension.   Musculoskeletal: She exhibits edema. She exhibits no tenderness.   1+ edema bilateral   Neurological: She is alert and oriented to person, place, and time.   Skin: No rash noted. No erythema.   Psychiatric: She has a normal mood and affect. Judgment normal.   Vitals reviewed.        Pertinent Test Results:  Results from last 7 days   Lab Units 20  0607 20  2241 20  0626 20  0615 20  1657   SODIUM mmol/L 141  " --  139 140 139   POTASSIUM mmol/L 4.1 4.4 3.1* 3.6 3.9   CHLORIDE mmol/L 99  --  95* 98 102   CO2 mmol/L 32.8*  --  33.1* 27.6 27.4   BUN mg/dL 15  --  13 7* 8   CREATININE mg/dL 0.76  --  0.81 0.65 0.65   GLUCOSE mg/dL 102*  --  111* 101* 142*   CALCIUM mg/dL 8.3*  --  8.5* 8.3* 8.2*   AST (SGOT) U/L  --   --   --   --  19   ALT (SGPT) U/L  --   --   --   --  16     Results from last 7 days   Lab Units 08/16/20  1657   TROPONIN T ng/mL <0.010     Results from last 7 days   Lab Units 08/19/20  0607 08/18/20  0626 08/17/20  0615 08/17/20  0054 08/16/20  1657   WBC 10*3/mm3 8.36 8.31 8.15  --  6.73   HEMOGLOBIN g/dL 10.6* 11.3* 10.3* 10.9* 10.3*   HEMATOCRIT % 33.1* 33.6* 32.2* 33.1* 31.2*   PLATELETS 10*3/mm3 269 291 252  --  260     Results from last 7 days   Lab Units 08/16/20  1659   INR  1.43*     Results from last 7 days   Lab Units 08/17/20  0615 08/16/20  1657   MAGNESIUM mg/dL 1.9 1.3*           Invalid input(s): LDLCALC  Results from last 7 days   Lab Units 08/16/20  1657   PROBNP pg/mL 1,631.0               Medication Review:     apixaban 5 mg Oral Q12H   atorvastatin 40 mg Oral Daily   carvedilol 25 mg Oral BID With Meals   digoxin 125 mcg Oral Daily   furosemide 40 mg Intravenous Q12H   levothyroxine 25 mcg Oral Daily   pantoprazole 40 mg Oral QAM   sertraline 50 mg Oral Daily   sodium chloride 10 mL Intravenous Q12H             Assessment/Plan   1. Acute on chronic diastolic heart failure -thought to be secondary to dietary indiscretion.  Patient carries a phone sarah that shows fairly well-controlled heart rates.  Heart rate is fairly well controlled overnight.  She feels much better after the initiation of diuretic.  She does have a large pericardial effusion that is followed by pulmonary hopefully treated conservatively.  Continue IV diuresis.  Echo results reviewed with hyperdynamic LV.  No significant valvular heart disease.  2. Moderate to large pleural effusion - repeat CXR in AM to assess  progress. pulm thinks we can avoid thoracentesis.   3. Persistent atrial fibrillation - continue rate control and anticoagulation. She is on apixaban.   4. Hypertension - likely accelerated by volume overload and increased sodium intake. It was very elevated in the emergency room, now improved. In general, her blood pressure is well controlled at home. Will follow.   5. Heme + stools on anticoagulation - Hgb is stable. Planning for outpatient upper and lower endoscopy.   6. Suspected sleep apnea -needs outpatient work-up but has refused in the past  7. Hypothyroidism - TSH stable in June.  8. Decreased appetite without weight loss.      Brian Skinner Jr, MD  [facility]  08/19/20  8:25 AM

## 2020-08-19 NOTE — PROGRESS NOTES
Continued Stay Note  [facility]     Patient Name: Jordana Hdz  MRN: [id number]  Today's Date: 8/19/2020    Admit Date: 8/16/2020    Discharge Plan     Row Name 08/19/20 0949       Plan    Plan  Return home, may need O2.    Patient/Family in Agreement with Plan  yes    Plan Comments  Spoke with patient at bedside.  Plan remains for [place] to [place] at [place].  She is agreeable to using [place] if needs O2.  CCP will continue to follow.  BHumeniuk RN       Expected Discharge Date and Time     Expected Discharge Date Expected Discharge Time    Aug 19, 2020             Becky S. Humeniuk, RN

## 2020-08-20 VITALS
BODY MASS INDEX: 29.86 KG/M2 | TEMPERATURE: 97.7 F | RESPIRATION RATE: 16 BRPM | WEIGHT: 197 LBS | DIASTOLIC BLOOD PRESSURE: 50 MMHG | OXYGEN SATURATION: 95 % | HEIGHT: 68 IN | HEART RATE: 86 BPM | SYSTOLIC BLOOD PRESSURE: 142 MMHG

## 2020-08-20 LAB
ANION GAP SERPL CALCULATED.3IONS-SCNC: 11.8 MMOL/L (ref 5–15)
BASOPHILS # BLD AUTO: 0.09 10*3/MM3 (ref 0–0.2)
BASOPHILS NFR BLD AUTO: 0.9 % (ref 0–1.5)
BUN SERPL-MCNC: 18 MG/DL (ref 8–23)
BUN/CREAT SERPL: 25.7 (ref 7–25)
CALCIUM SPEC-SCNC: 9.1 MG/DL (ref 8.6–10.5)
CHLORIDE SERPL-SCNC: 94 MMOL/L (ref 98–107)
CO2 SERPL-SCNC: 33.2 MMOL/L (ref 22–29)
CREAT SERPL-MCNC: 0.7 MG/DL (ref 0.57–1)
DEPRECATED RDW RBC AUTO: 40.3 FL (ref 37–54)
EOSINOPHIL # BLD AUTO: 0.35 10*3/MM3 (ref 0–0.4)
EOSINOPHIL NFR BLD AUTO: 3.4 % (ref 0.3–6.2)
ERYTHROCYTE [DISTWIDTH] IN BLOOD BY AUTOMATED COUNT: 13 % (ref 12.3–15.4)
GFR SERPL CREATININE-BSD FRML MDRD: 81 ML/MIN/1.73
GLUCOSE SERPL-MCNC: 117 MG/DL (ref 65–99)
HCT VFR BLD AUTO: 33.4 % (ref 34–46.6)
HGB BLD-MCNC: 11.2 G/DL (ref 12–15.9)
IMM GRANULOCYTES # BLD AUTO: 0.05 10*3/MM3 (ref 0–0.05)
IMM GRANULOCYTES NFR BLD AUTO: 0.5 % (ref 0–0.5)
LYMPHOCYTES # BLD AUTO: 2.03 10*3/MM3 (ref 0.7–3.1)
LYMPHOCYTES NFR BLD AUTO: 19.5 % (ref 19.6–45.3)
MCH RBC QN AUTO: 28.9 PG (ref 26.6–33)
MCHC RBC AUTO-ENTMCNC: 33.5 G/DL (ref 31.5–35.7)
MCV RBC AUTO: 86.3 FL (ref 79–97)
MONOCYTES # BLD AUTO: 1 10*3/MM3 (ref 0.1–0.9)
MONOCYTES NFR BLD AUTO: 9.6 % (ref 5–12)
NEUTROPHILS NFR BLD AUTO: 6.9 10*3/MM3 (ref 1.7–7)
NEUTROPHILS NFR BLD AUTO: 66.1 % (ref 42.7–76)
NRBC BLD AUTO-RTO: 0 /100 WBC (ref 0–0.2)
PLATELET # BLD AUTO: 289 10*3/MM3 (ref 140–450)
PMV BLD AUTO: 11.3 FL (ref 6–12)
POTASSIUM SERPL-SCNC: 4.5 MMOL/L (ref 3.5–5.2)
RBC # BLD AUTO: 3.87 10*6/MM3 (ref 3.77–5.28)
SODIUM SERPL-SCNC: 139 MMOL/L (ref 136–145)
WBC # BLD AUTO: 10.42 10*3/MM3 (ref 3.4–10.8)

## 2020-08-20 PROCEDURE — 80048 BASIC METABOLIC PNL TOTAL CA: CPT | Performed by: INTERNAL MEDICINE

## 2020-08-20 PROCEDURE — 25010000002 FUROSEMIDE PER 20 MG: Performed by: HOSPITALIST

## 2020-08-20 PROCEDURE — 85025 COMPLETE CBC W/AUTO DIFF WBC: CPT | Performed by: INTERNAL MEDICINE

## 2020-08-20 PROCEDURE — 99232 SBSQ HOSP IP/OBS MODERATE 35: CPT | Performed by: INTERNAL MEDICINE

## 2020-08-20 RX ORDER — POTASSIUM CHLORIDE 20 MEQ/1
20 TABLET, EXTENDED RELEASE ORAL 2 TIMES DAILY
Qty: 60 TABLET | Refills: 0 | Status: SHIPPED | OUTPATIENT
Start: 2020-08-20 | End: 2020-08-27 | Stop reason: DRUGHIGH

## 2020-08-20 RX ORDER — FUROSEMIDE 40 MG/1
40 TABLET ORAL DAILY
Status: DISCONTINUED | OUTPATIENT
Start: 2020-08-21 | End: 2020-08-20 | Stop reason: HOSPADM

## 2020-08-20 RX ORDER — FUROSEMIDE 40 MG/1
40 TABLET ORAL DAILY
Qty: 30 TABLET | Refills: 0 | Status: SHIPPED | OUTPATIENT
Start: 2020-08-21 | End: 2020-09-15 | Stop reason: SDUPTHER

## 2020-08-20 RX ADMIN — SERTRALINE 50 MG: 50 TABLET, FILM COATED ORAL at 09:00

## 2020-08-20 RX ADMIN — PANTOPRAZOLE SODIUM 40 MG: 40 TABLET, DELAYED RELEASE ORAL at 06:52

## 2020-08-20 RX ADMIN — CARVEDILOL 25 MG: 25 TABLET, FILM COATED ORAL at 09:00

## 2020-08-20 RX ADMIN — FUROSEMIDE 40 MG: 10 INJECTION, SOLUTION INTRAMUSCULAR; INTRAVENOUS at 06:52

## 2020-08-20 RX ADMIN — ATORVASTATIN CALCIUM 40 MG: 20 TABLET, FILM COATED ORAL at 09:00

## 2020-08-20 RX ADMIN — SODIUM CHLORIDE, PRESERVATIVE FREE 10 ML: 5 INJECTION INTRAVENOUS at 09:00

## 2020-08-20 RX ADMIN — APIXABAN 5 MG: 5 TABLET, FILM COATED ORAL at 09:00

## 2020-08-20 RX ADMIN — DIGOXIN 125 MCG: 125 TABLET ORAL at 09:00

## 2020-08-20 RX ADMIN — LEVOTHYROXINE SODIUM 25 MCG: 25 TABLET ORAL at 09:00

## 2020-08-20 NOTE — PLAN OF CARE
Problem: Patient Care Overview  Goal: Plan of Care Review  Outcome: Ongoing (interventions implemented as appropriate)  Flowsheets (Taken 8/20/2020 4153)  Progress: improving  Plan of Care Reviewed With: patient  Outcome Summary: No complaints or new issues overnight. Rested comfortably most of shift. Remains on 1L NC to maintain oxygenation. Still SOA with activity but patient states is improving. Up ad carlos. VSS. Will ct to monitor.

## 2020-08-20 NOTE — PROGRESS NOTES
"[facility]    Patient Name: Jordana Hdz  :1940  79 y.o.  LOS: 4  Encounter Provider: Brian Skinner Jr, MD      Patient Care Team:  Olive Claire MD as PCP - General    Chief Complaint: Follow-up acute on chronic diastolic heart failure, chronic atrial fibrillation, pleural effusion    Interval History: No acute issues overnight.  Heart rate controlled on telemetry.       Objective   Vital Signs  Temp:  [97.4 °F (36.3 °C)-97.9 °F (36.6 °C)] 97.7 °F (36.5 °C)  Heart Rate:  [26-86] 86  Resp:  [16-18] 16  BP: (102-147)/(50-78) 142/50    Intake/Output Summary (Last 24 hours) at 2020 1307  Last data filed at 2020 0900  Gross per 24 hour   Intake 480 ml   Output 1900 ml   Net -1420 ml     Flowsheet Rows      First Filed Value   Admission Height  172.7 cm (68\") Documented at 2020 1604   Admission Weight  88.5 kg (195 lb) Documented at 2020 1604            Physical Exam   Constitutional: She is oriented to person, place, and time. She appears well-developed and well-nourished.   HENT:   Head: Normocephalic and atraumatic.   Eyes: Pupils are equal, round, and reactive to light. Conjunctivae are normal.   Neck: No JVD present. No thyromegaly present.   Cardiovascular: Exam reveals no gallop and no friction rub.   No murmur heard.  Irregular rhythm   Pulmonary/Chest: No respiratory distress. She exhibits no tenderness.   Abdominal: Bowel sounds are normal. She exhibits no distension.   Musculoskeletal: She exhibits edema. She exhibits no tenderness.   Trace edema bilateral   Neurological: She is alert and oriented to person, place, and time.   Skin: No rash noted. No erythema.   Psychiatric: She has a normal mood and affect. Judgment normal.   Vitals reviewed.        Pertinent Test Results:  Results from last 7 days   Lab Units 20  0510 20  0607 20  2241 20  0626 20  0615 20  1657   SODIUM mmol/L 139 141  --  139 140 139 "   POTASSIUM mmol/L 4.5 4.1 4.4 3.1* 3.6 3.9   CHLORIDE mmol/L 94* 99  --  95* 98 102   CO2 mmol/L 33.2* 32.8*  --  33.1* 27.6 27.4   BUN mg/dL 18 15  --  13 7* 8   CREATININE mg/dL 0.70 0.76  --  0.81 0.65 0.65   GLUCOSE mg/dL 117* 102*  --  111* 101* 142*   CALCIUM mg/dL 9.1 8.3*  --  8.5* 8.3* 8.2*   AST (SGOT) U/L  --   --   --   --   --  19   ALT (SGPT) U/L  --   --   --   --   --  16     Results from last 7 days   Lab Units 08/16/20  1657   TROPONIN T ng/mL <0.010     Results from last 7 days   Lab Units 08/20/20  0510 08/19/20  0607 08/18/20  0626 08/17/20  0615 08/17/20  0054 08/16/20  1657   WBC 10*3/mm3 10.42 8.36 8.31 8.15  --  6.73   HEMOGLOBIN g/dL 11.2* 10.6* 11.3* 10.3* 10.9* 10.3*   HEMATOCRIT % 33.4* 33.1* 33.6* 32.2* 33.1* 31.2*   PLATELETS 10*3/mm3 289 269 291 252  --  260     Results from last 7 days   Lab Units 08/16/20  1659   INR  1.43*     Results from last 7 days   Lab Units 08/17/20  0615 08/16/20  1657   MAGNESIUM mg/dL 1.9 1.3*           Invalid input(s): LDLCALC  Results from last 7 days   Lab Units 08/16/20  1657   PROBNP pg/mL 1,631.0               Medication Review:     apixaban 5 mg Oral Q12H   atorvastatin 40 mg Oral Daily   carvedilol 25 mg Oral BID With Meals   digoxin 125 mcg Oral Daily   [START ON 8/21/2020] furosemide 40 mg Oral Daily   levothyroxine 25 mcg Oral Daily   pantoprazole 40 mg Oral QAM   sertraline 50 mg Oral Daily   sodium chloride 10 mL Intravenous Q12H             Assessment/Plan   1. Acute on chronic diastolic heart failure -thought to be secondary to dietary indiscretion.  Patient carries a phone sarah that shows fairly well-controlled heart rates.  Heart rate is fairly well controlled overnight.  She feels much better after the initiation of diuretic.  She does have a large pleural effusion that is followed by pulmonary hopefully treated conservatively.  Diuretics switched to oral dosing.  Echo results reviewed with hyperdynamic LV.  No significant valvular heart  disease.  2. Moderate to large pleural effusion - repeat CXR in AM to assess progress. pulm thinks we can avoid thoracentesis.   3. Persistent atrial fibrillation - continue rate control and anticoagulation. She is on apixaban.   4. Hypertension - likely accelerated by volume overload and increased sodium intake. It was very elevated in the emergency room, now improved. In general, her blood pressure is well controlled at home. Will follow.   5. Heme + stools on anticoagulation - Hgb is stable. Planning for outpatient upper and lower endoscopy.   6. Suspected sleep apnea -needs outpatient work-up but has refused in the past  7. Hypothyroidism - TSH stable in June.  8. Decreased appetite without weight loss.      I think she is much improved and cardiovascular issues are stable.  Per her overall weight monitoring in-house she is only lost 3 pounds but her symptoms are much improved.  Stable for discharge home from my point of view on higher dose of furosemide which was increased to 40 mg daily.  Patient will need daily home weights and has been given specific instructions on when to call the office with increased weight of more than 2 pounds in 1 day or 5 pounds in 1 week.  He had a very long discussion regarding sodium and volume restriction.  She will need short-term follow-up with Dr. Dolan or ESTEVAN.    Brian Skinner Jr, MD  [facility]  08/20/20  1:07 PM

## 2020-08-20 NOTE — PLAN OF CARE
Pt without complaints of pain. No shortness of air noted. Desats on roomair at rest to 85%. Pt to have 2L NC. Home oxygen set up. Appetite good. Taking po pills. Discharged to home with oxygen. Provided discharge instruction and education sheets.

## 2020-08-20 NOTE — DISCHARGE SUMMARY
Patient Name: Jordana Hdz  : 1940  MRN: [id number]    Date of Admission: 2020  Date of Discharge:  2020  Primary Care Physician: Olive Claire MD      Chief Complaint:   Shortness of Breath      Discharge Diagnoses     Active Hospital Problems    Diagnosis  POA   • **Pleural effusion on left [J90]  Yes   • Atrial fibrillation (CMS/HCC) [I48.91]  Yes   • Hypothyroidism (acquired) [E03.9]  Yes   • GERD (gastroesophageal reflux disease) [K21.9]  Yes   • HLD (hyperlipidemia) [E78.5]  Yes   • HTN (hypertension) [I10]  Yes   • Anemia [D64.9]  Yes   • Hypomagnesemia [E83.42]  Yes   • GI bleed [K92.2]  Yes   • Chronic diastolic CHF (congestive heart failure) (CMS/HCC) [I50.32]  Yes   • Dyspnea on exertion [R06.00]  Yes      Resolved Hospital Problems   No resolved problems to display.        Hospital Course   Ms. Hdz is a 79 y.o. female with a history of a fib on eliquis, hypothyroidism, HTN, HLD, GERD, dCHF that presents to [facility] complaining of shortness of breath. Patient reports progressively increasing shortness of breath and fatigue in past month, initially worse with exertion and laying flat but states it is now present most all the time, no relieving factors. She also reports diarrhea for past few weeks but denies dark or tarry stools. Patient denies chest pain, abdominal pain, nausea, vomiting, dizziness, urinary symptoms or edema. Labs done tonight show hemoglobin of 10.3, hypomagnesemia, and hemoccult positive stool was also noted. Patient denies history of GI bleeding in the past. CXR shows moderate to large left pleural effusions which is increased from prior imaging and left lower lobe atelectasis/infiltrate. Patient was given dose of IV lasix, mag sulfate, and protonix while in ED prior to transfer to the floor.     1. Acute hypoxic respiratory failure secondary to moderate to large left-sided pleural effusion, this is most likely secondary to acute  diastolic heart failure and patient  Patient was initially diuresed with IV Lasix and is being switched to p.o. Lasix upon discharge.  Echo revealed a normal ejection fraction.  Pulmonary did evaluate and no need for thoracentesis at this point.  Follow-up with pulmonary as an outpatient basis.  She is requiring 2 L of oxygen and eventually she should be able to be weaned off as an outpatient basis.  2. Anemia, occult blood is positive and GI consult has been obtained.   No obvious GI bleed.  3. History of atrial fibrillation, currently patient is rate controlled.  Continue with Eliquis, Coreg and digoxin.    4. Hypothyroidism, on Synthroid.    5. History of hypertension, blood pressure is reasonably well controlled and will continue with Coreg.    6. Hyperlipidemia, on statins.    7. Hypomagnesemia,Replace via protocol.      Please note patient was seen examined today on day of discharge.    Day of Discharge         Physical Exam:  Temp:  [97.4 °F (36.3 °C)-97.9 °F (36.6 °C)] 97.7 °F (36.5 °C)  Heart Rate:  [26-86] 86  Resp:  [16] 16  BP: (102-147)/(50-78) 142/50  Body mass index is 29.95 kg/m².  Physical Exam   HEENT:  Atraumatic, normocephalic.  PERRLA.  Extraocular movements intact.  Conjunctivae pink.  Sclerae, no icterus.  Mucous membranes dry.  Oropharynx is  clear.  NECK:  Supple.  No JVD.  HEART:  Irregular  rhythm.  Normal S1, S2.   LUNGS:  Fairly clear to auscultation anteriorly.  No wheezes.  No crackles.  ABDOMEN:   Soft, nontender.  Bowel sounds present.  No rebound.  No  guarding.  EXTREMITIES:  No cyanosis, clubbing, or edema.  Palpable pedal pulses.  NEURO:  Grossly nonfocal.  No facial asymmetry.  Good strength in all 4  extremities.  SKIN:  Warm and dry.  No evidence of rashes.  LYMPH NODES:  No palpable cervical or supraclavicular lymphadenopathy.    Consultants     Consult Orders (all) (From admission, onward)     Start     Ordered    08/17/20 1626  Inpatient Pulmonology Consult  Once      Specialty:  Pulmonary Disease  Provider:  Stephen Reyes MD    08/17/20 1625    08/17/20 1111  Inpatient Cardiology Consult  Once     Specialty:  Cardiology  Provider:  Guicho Pérez MD    08/17/20 1111    08/17/20 1058  Inpatient Case Management  Consult  Once     Provider:  (Not yet assigned)    08/17/20 1057    08/16/20 2100  Inpatient Pulmonology Consult  Once,   Status:  Canceled     Specialty:  Pulmonary Disease  Provider:  Stephen Reyes MD    08/16/20 2059 08/16/20 2058  Inpatient Gastroenterology Consult  Once     Specialty:  Gastroenterology  Provider:  Jared Cooper MD    08/16/20 2059 08/16/20 1902  [facility] (on-call MD unless specified) Details  Once     Specialty:  Hospitalist  Provider:  (Not yet assigned)    08/16/20 1901              Procedures     Imaging Results (All)     Procedure Component Value Units Date/Time    XR Chest PA & Lateral [[id number]] Collected:  08/19/20 1330     Updated:  08/19/20 1334    Narrative:       XR CHEST PA AND LATERAL-     HISTORY:  Pleural effusion.     COMPARISON:  Chest radiographs 08/18/2020.     FINDINGS:    3 views of the chest were obtained. The cardiac silhouette is enlarged,  similar to the prior study. Mediastinal and hilar contours are  unchanged. There is calcific aortic atherosclerosis. A small left  pleural effusion with mild adjacent patchy opacity has slightly  improved. There is no new focal consolidation. There is multilevel  degenerative disc disease. There is bilateral glenohumeral  osteoarthritis. There is calcific atherosclerosis of the upper abdominal  aorta.     CONCLUSION(S):       1.  Small left pleural effusion, slightly improved.     This report was finalized on 8/19/2020 1:31 PM by Dr. Dora Miner M.D.       XR Chest 2 View [[id number]] Collected:  08/18/20 1503     Updated:  08/18/20 1523    Narrative:       TWO-VIEW CHEST     HISTORY: Follow-up of left pleural effusion.     FINDINGS: There is a moderate left  pleural effusion showing very minimal  decrease in size since a study of 2 days ago. There is some adjacent  atelectasis and the lungs are otherwise clear. The heart remains mildly  enlarged.     This report was finalized on 8/18/2020 3:20 PM by Dr. Rafael Pastor M.D.       XR Chest 1 View [[id number]] Collected:  08/16/20 1738     Updated:  08/17/20 0850    Narrative:       PORTABLE CHEST     HISTORY: Dyspnea.     TECHNIQUE: A single portable view of chest was obtained and compared to  11/09/2019.     FINDINGS: The heart is within normal limits in size. There is a  moderate-to-large left pleural effusion which is increased in size as  compared to the prior examination. There is likely layering of a pleural  fluid collection on the right. There is left lower lobe  atelectasis/infiltrate. There is no evidence of congestive failure.     This report was finalized on 8/17/2020 8:47 AM by Dr. Cory Rodriguez M.D.             Pertinent Labs     Results from last 7 days   Lab Units 08/20/20  0510 08/19/20  0607 08/18/20  0626 08/17/20  0615   WBC 10*3/mm3 10.42 8.36 8.31 8.15   HEMOGLOBIN g/dL 11.2* 10.6* 11.3* 10.3*   PLATELETS 10*3/mm3 289 269 291 252     Results from last 7 days   Lab Units 08/20/20  0510 08/19/20  0607 08/18/20  2241 08/18/20  0626 08/17/20  0615   SODIUM mmol/L 139 141  --  139 140   POTASSIUM mmol/L 4.5 4.1 4.4 3.1* 3.6   CHLORIDE mmol/L 94* 99  --  95* 98   CO2 mmol/L 33.2* 32.8*  --  33.1* 27.6   BUN mg/dL 18 15  --  13 7*   CREATININE mg/dL 0.70 0.76  --  0.81 0.65   GLUCOSE mg/dL 117* 102*  --  111* 101*   Estimated Creatinine Clearance: 67.8 mL/min (by C-G formula based on SCr of 0.7 mg/dL).  Results from last 7 days   Lab Units 08/16/20  1657   ALBUMIN g/dL 3.40*   BILIRUBIN mg/dL 0.6   ALK PHOS U/L 101   AST (SGOT) U/L 19   ALT (SGPT) U/L 16     Results from last 7 days   Lab Units 08/20/20  0510 08/19/20  0607 08/18/20  0626 08/17/20  0615 08/16/20  1657   CALCIUM mg/dL 9.1 8.3* 8.5* 8.3*  8.2*   ALBUMIN g/dL  --   --   --   --  3.40*   MAGNESIUM mg/dL  --   --   --  1.9 1.3*       Results from last 7 days   Lab Units 08/16/20  1657   TROPONIN T ng/mL <0.010   PROBNP pg/mL 1,631.0   DIGOXIN LVL ng/mL 1.10     Results from last 7 days   Lab Units 08/17/20  0615   URIC ACID mg/dL 4.9         Invalid input(s): LDLCALC  Results from last 7 days   Lab Units 08/16/20  1700 08/16/20  1659   BLOODCX  No growth at 4 days No growth at 4 days       Test Results Pending at Discharge      Order Current Status    Blood Culture - Blood, Arm, Left Preliminary result    Blood Culture - Blood, Arm, Left Preliminary result          Discharge Details        Discharge Medications      New Medications      Instructions Start Date   potassium chloride 20 MEQ CR tablet  Commonly known as:  K-DUR,KLOR-CON   20 mEq, Oral, 2 Times Daily         Changes to Medications      Instructions Start Date   furosemide 40 MG tablet  Commonly known as:  LASIX  What changed:    medication strength  how much to take   40 mg, Oral, Daily   Start Date:  August 21, 2020        Continue These Medications      Instructions Start Date   apixaban 5 MG tablet tablet  Commonly known as:  ELIQUIS   5 mg, Oral, 2 Times Daily      carvedilol 25 MG tablet  Commonly known as:  COREG   25 mg, Oral, 2 Times Daily With Meals      cetirizine 10 MG tablet  Commonly known as:  zyrTEC   10 mg, Oral, Daily PRN      digoxin 125 MCG tablet  Commonly known as:  LANOXIN   125 mcg, Oral, Daily      levothyroxine 25 MCG tablet  Commonly known as:  SYNTHROID, LEVOTHROID   25 mcg, Oral, Daily      Lipitor 40 MG tablet  Generic drug:  atorvastatin   40 mg, Oral, Daily      omeprazole 20 MG capsule  Commonly known as:  priLOSEC   20 mg, Oral, Daily      Zoloft 50 MG tablet  Generic drug:  sertraline   Oral, Daily             No Known Allergies      Discharge Disposition:  Home or Self Care    Discharge Diet:  Diet Order   Procedures   • Diet Regular       Discharge  Activity:   Activity Instructions     Activity as Tolerated            CODE STATUS:    Code Status and Medical Interventions:   Ordered at: 08/16/20 2059     Code Status:    CPR     Medical Interventions (Level of Support Prior to Arrest):    Full       Future Appointments   Date Time Provider Department Center   12/30/2020  1:00 PM Guicho Pérez MD [facility] None     Additional Instructions for the Follow-ups that You Need to Schedule     Discharge Follow-up with PCP   As directed       Currently Documented PCP:    Olive Claire MD    PCP Phone Number:    [phone]     Follow Up Details:  2 weeks         Discharge Follow-up with Specified Provider: ; 2 Weeks   As directed      To:      Follow Up:  2 Weeks         Discharge Follow-up with Specified Provider: ; 1 Month   As directed      To:      Follow Up:  1 Month         Potassium    Aug 25, 2020 (Approximate)         Contact information for follow-up providers     Olive Claire MD .    Specialty:  Internal Medicine  Why:  2 weeks  Contact information:  [address]  [address]  [facility] [address]  [phone]                   Contact information for after-discharge care     Durable Medical Equipment     [facility] [place] .    Service:  Durable Medical Equipment  Contact information:  [address]  [place] [address]  [phone]                             Additional Instructions for the Follow-ups that You Need to Schedule     Discharge Follow-up with PCP   As directed       Currently Documented PCP:    Olive Claire MD    PCP Phone Number:    [phone]     Follow Up Details:  2 weeks         Discharge Follow-up with Specified Provider: ; 2 Weeks   As directed      To:      Follow Up:  2 Weeks         Discharge Follow-up with Specified Provider: ; 1 Month   As directed      To:      Follow Up:  1 Month         Potassium    Aug  25, 2020 (Approximate)        Time Spent on Discharge:  Greater than 30 minutes      Jc Hernandez MD  [facility]  08/20/20  6:20 PM

## 2020-08-20 NOTE — PROGRESS NOTES
[facility] INPATIENT PROGRESS NOTE         [address]    2020      PATIENT IDENTIFICATION:  Name: Jordana Hdz ADMIT: 2020   : 1940  PCP: Olive Claire MD    MRN: [id number] LOS: 4 days   AGE/SEX: 79 y.o. female  ROOM: [facility]                     LOS 4    Reason for visit: Follow-up pleural effusion and respiratory failure      SUBJECTIVE:      Says that she feels that her breathing has improved.  She is on 1 L of supplemental oxygen with saturations 97% at time of visit.  Diminished breath sounds on auscultation left greater than right mildly at bases.  No rhonchi.  No wheezing.  2D echo result is pending.    Objective   OBJECTIVE:    Vital Sign Min/Max for last 24 hours  Temp  Min: 97.4 °F (36.3 °C)  Max: 97.9 °F (36.6 °C)   BP  Min: 102/78  Max: 147/60   Pulse  Min: 26  Max: 86   Resp  Min: 16  Max: 18   SpO2  Min: 93 %  Max: 97 %   No data recorded   No data recorded                         Body mass index is 29.95 kg/m².    Intake/Output Summary (Last 24 hours) at 2020 1039  Last data filed at 2020 0006  Gross per 24 hour   Intake 600 ml   Output 1900 ml   Net -1300 ml         Exam:  GEN:  No distress, appears stated age  EYES:   PERRL, anicteric sclera  ENT:    External ears/nose normal, OP clear  NECK:  No adenopathy, midline trachea  LUNGS: Normal chest on inspection, palpation and auscultation  CV:  Normal S1S2, without murmur  ABD:  Non tender, non distended, no hepatosplenomegaly, +BS  EXT:  No edema, cyanosis or clubbing    Scheduled meds:      apixaban 5 mg Oral Q12H   atorvastatin 40 mg Oral Daily   carvedilol 25 mg Oral BID With Meals   digoxin 125 mcg Oral Daily   [START ON 2020] furosemide 40 mg Oral Daily   levothyroxine 25 mcg Oral Daily   pantoprazole 40 mg Oral QAM   sertraline 50 mg Oral Daily   sodium chloride 10 mL Intravenous Q12H     IV meds:                         Data Review:  Results from last 7 days   Lab Units 20  0510  08/19/20  0607 08/18/20  2241 08/18/20  0626 08/17/20  0615 08/16/20  1657   SODIUM mmol/L 139 141  --  139 140 139   POTASSIUM mmol/L 4.5 4.1 4.4 3.1* 3.6 3.9   CHLORIDE mmol/L 94* 99  --  95* 98 102   CO2 mmol/L 33.2* 32.8*  --  33.1* 27.6 27.4   BUN mg/dL 18 15  --  13 7* 8   CREATININE mg/dL 0.70 0.76  --  0.81 0.65 0.65   GLUCOSE mg/dL 117* 102*  --  111* 101* 142*   CALCIUM mg/dL 9.1 8.3*  --  8.5* 8.3* 8.2*         Estimated Creatinine Clearance: 67.8 mL/min (by C-G formula based on SCr of 0.7 mg/dL).  Results from last 7 days   Lab Units 08/20/20  0510 08/19/20  0607 08/18/20  0626 08/17/20  0615 08/17/20  0054 08/16/20  1657   WBC 10*3/mm3 10.42 8.36 8.31 8.15  --  6.73   HEMOGLOBIN g/dL 11.2* 10.6* 11.3* 10.3* 10.9* 10.3*   PLATELETS 10*3/mm3 289 269 291 252  --  260     Results from last 7 days   Lab Units 08/16/20  1659   INR  1.43*     Results from last 7 days   Lab Units 08/16/20  1657   ALT (SGPT) U/L 16   AST (SGOT) U/L 19         Results from last 7 days   Lab Units 08/16/20  1659 08/16/20  1657   PROCALCITONIN ng/mL  --  0.06   LACTATE mmol/L 1.2  --            X-ray 8/19/2020 shows improvement in left pleural effusion      2D echo reviewed: EF 71%      Microbiology reviewed  )        Active Hospital Problems    Diagnosis  POA   • **Dyspnea on exertion [R06.00]  Yes   • Atrial fibrillation (CMS/HCC) [I48.91]  Yes   • Hypothyroidism (acquired) [E03.9]  Yes   • GERD (gastroesophageal reflux disease) [K21.9]  Yes   • HLD (hyperlipidemia) [E78.5]  Yes   • HTN (hypertension) [I10]  Yes   • Pleural effusion on left [J90]  Yes   • Anemia [D64.9]  Yes   • Hypomagnesemia [E83.42]  Yes   • GI bleed [K92.2]  Yes   • Chronic diastolic CHF (congestive heart failure) (CMS/HCC) [I50.32]  Yes      Resolved Hospital Problems   No resolved problems to display.       Assessment   ASSESSMENT:  Acute hypoxemic respiratory failure due to moderate left pleural effusion  Acute on chronic diastolic CHF  Chronic atrial  fibrillation  Hypothyroidism  Former smoker quit 1976    PLAN:    Diuresing well.  Repeat chest x-ray 8/19 shows slight improvement in small left pleural effusion.  No need for thoracentesis per      Mo Villalobos MD  Pulmonary and Critical Care Medicine  [facility], [facility]  8/20/2020    10:39

## 2020-08-20 NOTE — PROGRESS NOTES
Continued Stay Note  [facility]     Patient Name: Jordana Hdz  MRN: [id number]  Today's Date: 8/20/2020    Admit Date: 8/16/2020    Discharge Plan     Row Name 08/20/20 1205       Plan    Plan  return home- [facility] to provide home o2    Plan Comments  DC orders in EPIC.  Spoke with patient at bedside.  Discussed HH, but she does not think she will need it.  She will need o2 at dc and is agreeable to using [facility].  Referral called to Valerie/Ritesh. Betsey Solorzano RN        Discharge Codes    No documentation.       Expected Discharge Date and Time     Expected Discharge Date Expected Discharge Time    Aug 20, 2020             Betsey Solorzano RN

## 2020-08-21 ENCOUNTER — READMISSION MANAGEMENT (OUTPATIENT)
Dept: CALL CENTER | Facility: HOSPITAL | Age: 80
End: 2020-08-21

## 2020-08-21 LAB
BACTERIA SPEC AEROBE CULT: NORMAL
BACTERIA SPEC AEROBE CULT: NORMAL

## 2020-08-21 NOTE — OUTREACH NOTE
Prep Survey      Responses   [facility] facility patient discharged from?  [place]   Is LACE score < 7 ?  No   Eligibility  Readm Mgmt   Discharge diagnosis  Left pleural effusion, acute diastolic heart failure   COVID-19 Test Status  Negative   Does the patient have one of the following disease processes/diagnoses(primary or secondary)?  CHF   Does the patient have Home health ordered?  No   Is there a DME ordered?  Yes   What DME was ordered?  [facility] for home oxygen   Comments regarding appointments  Per AVS   Prep survey completed?  Yes          Cata Graves RN

## 2020-08-21 NOTE — PROGRESS NOTES
Case Management Discharge Note      Final Note: DC'd home with [facility] to provide o2. Betsey Solorzano RN         Destination      No service has been selected for the patient.      Durable Medical Equipment - Selection Complete      Service Provider Request Status Selected Services Address Phone Number Fax Number    [facility] [place] Selected Durable Medical Equipment [address] [address] [phone]      Dialysis/Infusion      No service has been selected for the patient.      Home Medical Care      No service has been selected for the patient.      Therapy      No service has been selected for the patient.      Community Resources      No service has been selected for the patient.        Transportation Services  Private: Car    Final Discharge Disposition Code: 01 - home or self-care

## 2020-08-25 ENCOUNTER — READMISSION MANAGEMENT (OUTPATIENT)
Dept: CALL CENTER | Facility: HOSPITAL | Age: 80
End: 2020-08-25

## 2020-08-25 NOTE — OUTREACH NOTE
CHF Week 1 Survey      Responses   [facility] patient discharged from?  [place]   Does the patient have one of the following disease processes/diagnoses(primary or secondary)?  CHF   CHF Week 1 attempt successful?  Yes   Call start time  1347   Call end time  1411   Discharge diagnosis  Left pleural effusion, acute diastolic heart failure   Meds reviewed with patient/caregiver?  Yes   Is the patient having any side effects they believe may be caused by any medication additions or changes?  No   Does the patient have all medications ordered at discharge?  Yes   Is the patient taking all medications as directed (includes completed medication regime)?  Yes   Medication comments  Pt having very difficult time swallowing KCl pills. She has been dissolving them, which is advised against according to her medication bottle. Advised her to call MD to ask for a different or smaller form of KCL.   Does the patient have a primary care provider?   Yes   Does the patient have an appointment with their PCP within 7 days of discharge?  Yes   Has the patient kept scheduled appointments due by today?  N/A   What DME was ordered?  [facility] for home oxygen   Has all DME been delivered?  Yes   DME comments  2L continuously   Pulse Ox monitoring  None   Psychosocial issues?  No   Comments  Pt reports breathing better. She reports sleeping well and feels the O2 is helping. She has had 14#wt loss per pt.    Did the patient receive a copy of their discharge instructions?  Yes   Nursing interventions  Reviewed instructions with patient   What is the patient's perception of their health status since discharge?  Improving   Nursing interventions  Nurse provided patient education   Is the patient weighing daily?  Yes   Does the patient have scales?  Yes   Daily weight interventions  Education provided on importance of daily weight   Is the patient able to teach back Heart Failure diet management?  Yes   Is the patient able to teach back  Heart Failure Zones?  Yes   Is the patient able to teach back signs and symptoms of worsening condition? (i.e. weight gain, shortness of air, etc.)  Yes   Is the patient/caregiver able to teach back the hierarchy of who to call/visit for symptoms/problems? PCP, Specialist, Home health nurse, Urgent Care, ED, 911  Yes    CHF Week 1 call completed?  Yes          Shandra Hawkins RN

## 2020-08-27 ENCOUNTER — TELEPHONE (OUTPATIENT)
Dept: CARDIOLOGY | Facility: CLINIC | Age: 80
End: 2020-08-27

## 2020-08-27 RX ORDER — POTASSIUM CHLORIDE 750 MG/1
TABLET, FILM COATED, EXTENDED RELEASE ORAL
Qty: 120 TABLET | Refills: 2 | Status: SHIPPED | OUTPATIENT
Start: 2020-08-27 | End: 2020-12-01

## 2020-08-27 RX ORDER — POTASSIUM CHLORIDE 750 MG/1
10 TABLET, FILM COATED, EXTENDED RELEASE ORAL
COMMUNITY
End: 2020-08-27 | Stop reason: SDUPTHER

## 2020-08-27 NOTE — TELEPHONE ENCOUNTER
Pt called stating the Potassium 20 meq tablets(BID) are really hard to swallow.  She would like to know if we could send in a new prescription for Potassium 10 meq tablets?  She states the 10 meq tablets are easier to swallow.  She is aware she would have to take 2 tablets BID.  Thanks/anum    # [phone]

## 2020-09-01 ENCOUNTER — TELEPHONE (OUTPATIENT)
Dept: CARDIOLOGY | Facility: CLINIC | Age: 80
End: 2020-09-01

## 2020-09-01 NOTE — TELEPHONE ENCOUNTER
We received a fax from Dr. Cooper's office regarding Ms. Hdz.  She is scheduled for an EDG/colonoscopy on 9/2520.  They would like pt to hold the Eliquis for 3-5 days prior.  I placed the fax in your inbox for your review.  Thanks/jlf    Dr. Cooper's # [phone]  Fax# [phone]

## 2020-09-02 ENCOUNTER — READMISSION MANAGEMENT (OUTPATIENT)
Dept: CALL CENTER | Facility: HOSPITAL | Age: 80
End: 2020-09-02

## 2020-09-02 NOTE — OUTREACH NOTE
CHF Week 2 Survey      Responses   [facility] patient discharged from?  [place]   Does the patient have one of the following disease processes/diagnoses(primary or secondary)?  CHF   Week 2 attempt successful?  No   Unsuccessful attempts  Attempt 1          Danni Winter LPN

## 2020-09-02 NOTE — TELEPHONE ENCOUNTER
Patient is scheduled 10/8/20 with Dr. Pérez for hospital follow up. Does she need to see you sooner?     Thanks  Penny

## 2020-09-04 ENCOUNTER — OFFICE VISIT (OUTPATIENT)
Dept: CARDIOLOGY | Facility: CLINIC | Age: 80
End: 2020-09-04

## 2020-09-04 ENCOUNTER — READMISSION MANAGEMENT (OUTPATIENT)
Dept: CALL CENTER | Facility: HOSPITAL | Age: 80
End: 2020-09-04

## 2020-09-04 ENCOUNTER — LAB (OUTPATIENT)
Dept: LAB | Facility: HOSPITAL | Age: 80
End: 2020-09-04

## 2020-09-04 VITALS
SYSTOLIC BLOOD PRESSURE: 120 MMHG | WEIGHT: 188 LBS | HEART RATE: 77 BPM | BODY MASS INDEX: 28.49 KG/M2 | HEIGHT: 68 IN | DIASTOLIC BLOOD PRESSURE: 58 MMHG

## 2020-09-04 DIAGNOSIS — I10 ESSENTIAL HYPERTENSION: ICD-10-CM

## 2020-09-04 DIAGNOSIS — Z51.81 ENCOUNTER FOR THERAPEUTIC DRUG LEVEL MONITORING: ICD-10-CM

## 2020-09-04 DIAGNOSIS — I48.91 ATRIAL FIBRILLATION, UNSPECIFIED TYPE (HCC): Primary | ICD-10-CM

## 2020-09-04 DIAGNOSIS — I50.32 CHRONIC DIASTOLIC CHF (CONGESTIVE HEART FAILURE) (HCC): ICD-10-CM

## 2020-09-04 DIAGNOSIS — E78.5 HYPERLIPIDEMIA, UNSPECIFIED HYPERLIPIDEMIA TYPE: ICD-10-CM

## 2020-09-04 LAB
ANION GAP SERPL CALCULATED.3IONS-SCNC: 11.2 MMOL/L (ref 5–15)
BUN SERPL-MCNC: 17 MG/DL (ref 8–23)
BUN/CREAT SERPL: 19.3 (ref 7–25)
CALCIUM SPEC-SCNC: 9 MG/DL (ref 8.6–10.5)
CHLORIDE SERPL-SCNC: 99 MMOL/L (ref 98–107)
CO2 SERPL-SCNC: 25.8 MMOL/L (ref 22–29)
CREAT SERPL-MCNC: 0.88 MG/DL (ref 0.57–1)
GFR SERPL CREATININE-BSD FRML MDRD: 62 ML/MIN/1.73
GLUCOSE SERPL-MCNC: 121 MG/DL (ref 65–99)
POTASSIUM SERPL-SCNC: 4.5 MMOL/L (ref 3.5–5.2)
SODIUM SERPL-SCNC: 136 MMOL/L (ref 136–145)

## 2020-09-04 PROCEDURE — 99214 OFFICE O/P EST MOD 30 MIN: CPT | Performed by: NURSE PRACTITIONER

## 2020-09-04 PROCEDURE — 36415 COLL VENOUS BLD VENIPUNCTURE: CPT

## 2020-09-04 PROCEDURE — 93000 ELECTROCARDIOGRAM COMPLETE: CPT | Performed by: NURSE PRACTITIONER

## 2020-09-04 PROCEDURE — 80048 BASIC METABOLIC PNL TOTAL CA: CPT

## 2020-09-04 NOTE — PROGRESS NOTES
Date of Office Visit: 20  Encounter Provider: ESTEVAN Zafar  Place of Service: [facility] CARDIOLOGY  Patient Name: Jordana Hdz  :1940    Chief Complaint   Patient presents with   • Paroxysmal atrial fibrillation (CMS/HCC)   • Congestive Heart Failure   • Follow-up   :     HPI: Jordana Hdz is a 79 y.o. female  with history of hypertension, hyperlipidemia, congestive heart failure, and atrial fibrillation/flutter. She is followed by Dr. Pérez and I will see her in follow up today      She has a history of palpitation and had 1 episode of atrial flutter.  Ablation was discussed but it was declined.  She also had PVCs.  She was last seen in the office in 2016.  She had an occasional skipped beat sensation which was rare but no fluttering sensation.     She had an ER visit ion 2017 with complaint of dizziness and a near syncope episdoe which had resolved upon arrival. She also reported the week prior having anepisode of rapid palpitation which resolved quickly. TSH was elevated, Cr0.68, GFR84. EKG NSR.  In 2018 she presented and was in atrial fibrillation with a heart rate of 115.  Carvedilol was increased to 12.5 mg twice daily and she was to start Eliquis.  At that time she was taking an antibiotic for salivary gland infection.  At her 1 month follow-up she continued to have symptoms of increased shortness of breath with activity but no palpitations.  Her heart rate was elevated at 112  so we increase carvedilol to 25 mg twice daily.  She was evaluated by Dr. Smalls and discuss rate versus rhythm control strategy.  24-hour Holter completed 10/2/2018 showed average heart rate 92.  She did not want cardioversion so rate control and anticoagulation was continued.     She presented to the emergency department on 2019 with worsening shortness of breath.  proBNP was within normal range.  Chest x-ray showed minimal left pleural  effusion and basilar atelectasis.  She received IV Lasix 40 mg x 1 and was started on by mouth Lasix and potassium replacement. Indapamide had been stopped approximately one month prior.follow up BMP was stable and she was continued on furosemide.      Then in 05/2020, she reported heart rates as high as 130 bpm but blood pressure was low and she was started on digoxin 125 mcg daily.     She then was admitted in the hospital after presenting with shortness of breath.  She was found to have left moderate to large pleural effusion.  Troponin, BNP, procalcitonin were normal range.  She was found to be anemic.  Folate, ferritin and vitamin B-12 were normal.  Iron profile was abnormal.  Echocardiogram revealed hyperdynamic left ventricular systolic function with an EF of 71%.  There was mild thickening of the aortic valve but no stenosis or regurgitation noted.  She responded well to diuretic.  Follow-up chest x-ray showed decreased pleural effusion and pulmonary did not feel that thoracentesis was needed.  Also had elevated blood pressure on admission which improved.  She had heme positive stool and was to have an outpatient upper and lower scope.      We visit today for hospital discharge follow-up.  She has been weighing herself and is down a total of 12 pounds since prior to admission.  She is not had significant weight gain such as 2 pound gain overnight or 5 pound gain in a week.  She was not watching her sodium intake prior to admission and has been very strict about limiting salt.  She had a chest x-ray yesterday with pulmonary and was told her pleural effusion has resolved.  She did a 6-minute walk test and is to continue using 2 L of oxygen with exertion as well as at night.  She has baseline palpitations she has been watching her heart rate on her Fitbit and that has been consistently less than 100.  She is not having edema, chest pain tightness pressure.  She has some intermittent lightheadedness and fatigue.  " She is not performing any structured exercise.  She is not had follow-up labs either.        No Known Allergies    Past Medical History:   Diagnosis Date   • Acute congestive heart failure (CMS/HCC)    • Atrial fibrillation (CMS/HCC)    • Atrial flutter (CMS/HCC)    • Disease of thyroid gland    • GERD (gastroesophageal reflux disease)    • Hyperlipidemia    • Hypertension        Past Surgical History:   Procedure Laterality Date   • CATARACT EXTRACTION     • TUBAL ABDOMINAL LIGATION           Family and social history reviewed.     ROS  All other systems were reviewed and are negative          Objective:     Vitals:    09/04/20 1324   BP: 120/58   BP Location: Left arm   Patient Position: Sitting   Pulse: 77   Weight: 85.3 kg (188 lb)   Height: 172.7 cm (68\")     Body mass index is 28.59 kg/m².    PHYSICAL EXAM:  Physical Exam   Constitutional: She is oriented to person, place, and time. She appears well-developed and well-nourished. No distress.   HENT:   Head: Normocephalic.   Eyes: Conjunctivae are normal.   Neck: Normal range of motion. No JVD present.   Cardiovascular: Normal rate, normal heart sounds and intact distal pulses. An irregularly irregular rhythm present.   No murmur heard.  Pulses:       Carotid pulses are 2+ on the right side, and 2+ on the left side.       Radial pulses are 2+ on the right side, and 2+ on the left side.        Posterior tibial pulses are 2+ on the right side, and 2+ on the left side.   Pulmonary/Chest: Effort normal and breath sounds normal. No respiratory distress. She has no wheezes. She has no rhonchi. She has no rales. She exhibits no tenderness.   Abdominal: Soft. Bowel sounds are normal. She exhibits no distension.   Musculoskeletal: Normal range of motion. She exhibits no edema.   Neurological: She is alert and oriented to person, place, and time.   Skin: Skin is warm, dry and intact. No rash noted. She is not diaphoretic. No cyanosis.   Psychiatric: She has a normal " mood and affect. Her behavior is normal. Judgment and thought content normal.         ECG 12 Lead  Date/Time: 9/4/2020 2:14 PM  Performed by: Vilma Patterson APRN  Authorized by: Vilma Patterson APRN   Comparison: compared with previous ECG   Similar to previous ECG  Rhythm: atrial fibrillation  Rate: normal    Clinical impression: abnormal EKG            Current Outpatient Medications   Medication Sig Dispense Refill   • apixaban (ELIQUIS) 5 MG tablet tablet Take 5 mg by mouth 2 (Two) Times a Day.     • atorvastatin (LIPITOR) 40 MG tablet Take 40 mg by mouth Daily.     • carvedilol (COREG) 25 MG tablet Take 25 mg by mouth 2 (Two) Times a Day With Meals.     • cetirizine (zyrTEC) 10 MG tablet Take 10 mg by mouth Daily As Needed.     • digoxin (LANOXIN) 125 MCG tablet Take 1 tablet by mouth Daily. 30 tablet 11   • furosemide (LASIX) 40 MG tablet Take 1 tablet by mouth Daily for 30 days. 30 tablet 0   • levothyroxine (SYNTHROID, LEVOTHROID) 25 MCG tablet Take 25 mcg by mouth Daily.     • omeprazole (priLOSEC) 20 MG capsule Take 20 mg by mouth Daily.     • potassium chloride (K-DUR) 10 MEQ CR tablet Take 2 tablets twice daily 120 tablet 2   • sertraline (ZOLOFT) 50 MG tablet Take  by mouth daily.       No current facility-administered medications for this visit.      Assessment:       Diagnosis Plan   1. Atrial fibrillation, unspecified type (CMS/HCC)     2. Chronic diastolic CHF (congestive heart failure) (CMS/HCC)     3. Essential hypertension     4. Hyperlipidemia, unspecified hyperlipidemia type     5. Encounter for therapeutic drug level monitoring  Basic Metabolic Panel        Orders Placed This Encounter   Procedures   • Basic Metabolic Panel     Standing Status:   Future     Standing Expiration Date:   9/5/2021         Plan:       1.  79-year-old female with paroxysmal atrial fibrillation. CHADS2-VASc score is 2 anticoagulated with Eliquis. Digoxin level stable. Rate controlled  2.Hyperlipidemia on target dose  atorvastatin  3. Hypertension blood pressure appears stable today she has history of fluctuating blood pressure no sustained elevations  4. Suspected sleep apnea she has no desire to really be tested  5.Hypothyroidism on replacement therapy  6.history of premature ventricular contraction  7.  Heart failure preserved ventricular ejection fraction.  Now on increased furosemide at 40 mg daily with potassium replacement.  She is to have a BMP today if that is stable continue the same call with significant weight gain.  She is to continue limiting sodium.  8. GERD on therapy    Follow up in one month with Dr. Péerz. She would like to follow with Dr. Skinner in the future.           It has been a pleasure to participate in this patient's care.      Thank you,  ESTEVAN Zafar      **I used Dragon to dictate this note:**

## 2020-09-04 NOTE — OUTREACH NOTE
CHF Week 2 Survey      Responses   [facility] patient discharged from?  [place]   Does the patient have one of the following disease processes/diagnoses(primary or secondary)?  CHF   Week 2 attempt successful?  No   Unsuccessful attempts  Attempt 2          Dwain Felipe RN

## 2020-09-14 ENCOUNTER — READMISSION MANAGEMENT (OUTPATIENT)
Dept: CALL CENTER | Facility: HOSPITAL | Age: 80
End: 2020-09-14

## 2020-09-14 NOTE — OUTREACH NOTE
CHF Week 3 Survey      Responses   [facility] patient discharged from?  [place]   Does the patient have one of the following disease processes/diagnoses(primary or secondary)?  CHF   Week 3 attempt successful?  Yes   Call start time  1317   Call end time  1340   Discharge diagnosis  Left pleural effusion, acute diastolic heart failure   Meds reviewed with patient/caregiver?  Yes   Is the patient having any side effects they believe may be caused by any medication additions or changes?  No   Does the patient have all medications ordered at discharge?  Yes   Is the patient taking all medications as directed (includes completed medication regime)?  Yes   Does the patient have a primary care provider?   Yes   Does the patient have an appointment with their PCP within 7 days of discharge?  Yes   Has the patient kept scheduled appointments due by today?  Yes   Has home health visited the patient within 72 hours of discharge?  N/A   Pulse Ox monitoring  None   Psychosocial issues?  No   Comments  pt states feels weak at times, has had loose stools and diarrhea most of summmer, states has colonoscopy scheduled soon to determine cause of anemia, rectal bleeding and diarrhea   Did the patient receive a copy of their discharge instructions?  Yes   Nursing interventions  Reviewed instructions with patient   What is the patient's perception of their health status since discharge?  Improving   Nursing interventions  Nurse provided patient education   Is the patient weighing daily?  Yes   Does the patient have scales?  Yes   Is the patient able to teach back Heart Failure diet management?  Yes   Is the patient able to teach back Heart Failure Zones?  Yes   Is the patient able to teach back signs and symptoms of worsening condition? (i.e. weight gain, shortness of air, etc.)  Yes   Is the patient/caregiver able to teach back the hierarchy of who to call/visit for symptoms/problems? PCP, Specialist, Home health nurse,  Urgent Care, ED, 911  Yes   Additional teach back comments  discussed sources of iron in diet, pt is on Eliquis so moderation is important,    CHF Week 3 call completed?  Yes          Janette Seals RN

## 2020-09-15 RX ORDER — FUROSEMIDE 40 MG/1
40 TABLET ORAL DAILY
Qty: 90 TABLET | Refills: 2 | Status: SHIPPED | OUTPATIENT
Start: 2020-09-15 | End: 2020-10-12 | Stop reason: SDUPTHER

## 2020-09-22 ENCOUNTER — TRANSCRIBE ORDERS (OUTPATIENT)
Dept: ADMINISTRATIVE | Facility: HOSPITAL | Age: 80
End: 2020-09-22

## 2020-09-22 DIAGNOSIS — Z01.818 OTHER SPECIFIED PRE-OPERATIVE EXAMINATION: Primary | ICD-10-CM

## 2020-09-23 ENCOUNTER — LAB (OUTPATIENT)
Dept: LAB | Facility: HOSPITAL | Age: 80
End: 2020-09-23

## 2020-09-23 ENCOUNTER — READMISSION MANAGEMENT (OUTPATIENT)
Dept: CALL CENTER | Facility: HOSPITAL | Age: 80
End: 2020-09-23

## 2020-09-23 DIAGNOSIS — Z01.818 OTHER SPECIFIED PRE-OPERATIVE EXAMINATION: ICD-10-CM

## 2020-09-23 PROCEDURE — U0004 COV-19 TEST NON-CDC HGH THRU: HCPCS

## 2020-09-23 PROCEDURE — C9803 HOPD COVID-19 SPEC COLLECT: HCPCS

## 2020-09-23 NOTE — OUTREACH NOTE
CHF Week 4 Survey      Responses   [facility] patient discharged from?  [place]   Does the patient have one of the following disease processes/diagnoses(primary or secondary)?  CHF   Week 4 attempt successful?  Yes   Call start time  0918   Call end time  0923   Discharge diagnosis  Left pleural effusion, acute diastolic heart failure   Is patient permission given to speak with other caregiver?  No   Meds reviewed with patient/caregiver?  Yes   Is the patient taking all medications as directed (includes completed medication regime)?  Yes   Has the patient kept scheduled appointments due by today?  Yes   Comments  Patient reports that she is having a colonoscopy on Friday    Is the patient still receiving Home Health Services?  N/A   Pulse Ox monitoring  None   Psychosocial issues?  No   What is the patient's perception of their health status since discharge?  Improving   Nursing interventions  Nurse provided patient education   Is the patient weighing daily?  Yes [Patient reports stable weight. ]   Does the patient have scales?  Yes   Daily weight interventions  Education provided on importance of daily weight   Is the patient able to teach back Heart Failure diet management?  Yes [Low sodium]   Is the patient able to teach back signs and symptoms of worsening condition? (i.e. weight gain, shortness of air, etc.)  Yes   Is the patient/caregiver able to teach back the hierarchy of who to call/visit for symptoms/problems? PCP, Specialist, Home health nurse, Urgent Care, ED, 911  Yes   Week 4 Call Completed?  Yes   Would the patient like one additional call?  No   Graduated  Yes   Did the patient feel the follow up calls were helpful during their recovery period?  Yes   Was the number of calls appropriate?  Yes          Valerie Richards RN

## 2020-09-24 LAB — SARS-COV-2 RNA RESP QL NAA+PROBE: NOT DETECTED

## 2020-09-25 ENCOUNTER — ANESTHESIA (OUTPATIENT)
Dept: GASTROENTEROLOGY | Facility: HOSPITAL | Age: 80
End: 2020-09-25

## 2020-09-25 ENCOUNTER — TELEPHONE (OUTPATIENT)
Dept: CARDIOLOGY | Facility: CLINIC | Age: 80
End: 2020-09-25

## 2020-09-25 ENCOUNTER — ON CAMPUS - OUTPATIENT (OUTPATIENT)
Dept: URBAN - METROPOLITAN AREA HOSPITAL 114 | Facility: HOSPITAL | Age: 80
End: 2020-09-25
Payer: COMMERCIAL

## 2020-09-25 ENCOUNTER — HOSPITAL ENCOUNTER (OUTPATIENT)
Facility: HOSPITAL | Age: 80
Setting detail: HOSPITAL OUTPATIENT SURGERY
Discharge: HOME | End: 2020-09-25
Attending: INTERNAL MEDICINE | Admitting: INTERNAL MEDICINE

## 2020-09-25 ENCOUNTER — ANESTHESIA EVENT (OUTPATIENT)
Dept: GASTROENTEROLOGY | Facility: HOSPITAL | Age: 80
End: 2020-09-25

## 2020-09-25 VITALS
RESPIRATION RATE: 16 BRPM | OXYGEN SATURATION: 94 % | DIASTOLIC BLOOD PRESSURE: 81 MMHG | HEART RATE: 80 BPM | WEIGHT: 180 LBS | TEMPERATURE: 97.8 F | HEIGHT: 68 IN | SYSTOLIC BLOOD PRESSURE: 133 MMHG | BODY MASS INDEX: 27.28 KG/M2

## 2020-09-25 DIAGNOSIS — D50.9 IRON DEFICIENCY ANEMIA, UNSPECIFIED: ICD-10-CM

## 2020-09-25 DIAGNOSIS — K31.819 ANGIODYSPLASIA OF STOMACH AND DUODENUM WITHOUT BLEEDING: ICD-10-CM

## 2020-09-25 DIAGNOSIS — K62.1 RECTAL POLYP: ICD-10-CM

## 2020-09-25 DIAGNOSIS — K44.9 DIAPHRAGMATIC HERNIA WITHOUT OBSTRUCTION OR GANGRENE: ICD-10-CM

## 2020-09-25 DIAGNOSIS — K29.50 UNSPECIFIED CHRONIC GASTRITIS WITHOUT BLEEDING: ICD-10-CM

## 2020-09-25 DIAGNOSIS — K63.5 POLYP OF COLON: ICD-10-CM

## 2020-09-25 DIAGNOSIS — D50.9 IRON DEFICIENCY ANEMIA: ICD-10-CM

## 2020-09-25 DIAGNOSIS — K57.30 DIVERTICULOSIS OF LARGE INTESTINE WITHOUT PERFORATION OR ABS: ICD-10-CM

## 2020-09-25 DIAGNOSIS — D12.5 BENIGN NEOPLASM OF SIGMOID COLON: ICD-10-CM

## 2020-09-25 PROCEDURE — 45380 COLONOSCOPY AND BIOPSY: CPT | Performed by: INTERNAL MEDICINE

## 2020-09-25 PROCEDURE — 43239 EGD BIOPSY SINGLE/MULTIPLE: CPT | Performed by: INTERNAL MEDICINE

## 2020-09-25 PROCEDURE — 25010000002 ONDANSETRON PER 1 MG: Performed by: NURSE ANESTHETIST, CERTIFIED REGISTERED

## 2020-09-25 PROCEDURE — 88305 TISSUE EXAM BY PATHOLOGIST: CPT | Performed by: INTERNAL MEDICINE

## 2020-09-25 PROCEDURE — 25010000002 PROPOFOL 10 MG/ML EMULSION: Performed by: NURSE ANESTHETIST, CERTIFIED REGISTERED

## 2020-09-25 RX ORDER — ONDANSETRON 2 MG/ML
INJECTION INTRAMUSCULAR; INTRAVENOUS AS NEEDED
Status: DISCONTINUED | OUTPATIENT
Start: 2020-09-25 | End: 2020-09-25 | Stop reason: SURG

## 2020-09-25 RX ORDER — SODIUM CHLORIDE, SODIUM LACTATE, POTASSIUM CHLORIDE, CALCIUM CHLORIDE 600; 310; 30; 20 MG/100ML; MG/100ML; MG/100ML; MG/100ML
30 INJECTION, SOLUTION INTRAVENOUS CONTINUOUS PRN
Status: DISCONTINUED | OUTPATIENT
Start: 2020-09-25 | End: 2020-09-25 | Stop reason: HOSPADM

## 2020-09-25 RX ORDER — PROPOFOL 10 MG/ML
VIAL (ML) INTRAVENOUS CONTINUOUS PRN
Status: DISCONTINUED | OUTPATIENT
Start: 2020-09-25 | End: 2020-09-25 | Stop reason: SURG

## 2020-09-25 RX ORDER — LIDOCAINE HYDROCHLORIDE 20 MG/ML
INJECTION, SOLUTION INFILTRATION; PERINEURAL AS NEEDED
Status: DISCONTINUED | OUTPATIENT
Start: 2020-09-25 | End: 2020-09-25 | Stop reason: SURG

## 2020-09-25 RX ORDER — PROPOFOL 10 MG/ML
VIAL (ML) INTRAVENOUS AS NEEDED
Status: DISCONTINUED | OUTPATIENT
Start: 2020-09-25 | End: 2020-09-25 | Stop reason: SURG

## 2020-09-25 RX ADMIN — ONDANSETRON HYDROCHLORIDE 4 MG: 2 SOLUTION INTRAMUSCULAR; INTRAVENOUS at 08:02

## 2020-09-25 RX ADMIN — PROPOFOL 80 MG: 10 INJECTION, EMULSION INTRAVENOUS at 08:01

## 2020-09-25 RX ADMIN — SODIUM CHLORIDE, POTASSIUM CHLORIDE, SODIUM LACTATE AND CALCIUM CHLORIDE: 600; 310; 30; 20 INJECTION, SOLUTION INTRAVENOUS at 07:56

## 2020-09-25 RX ADMIN — PROPOFOL 120 MCG/KG/MIN: 10 INJECTION, EMULSION INTRAVENOUS at 08:01

## 2020-09-25 RX ADMIN — LIDOCAINE HYDROCHLORIDE 50 MG: 20 INJECTION, SOLUTION INFILTRATION; PERINEURAL at 08:01

## 2020-09-25 NOTE — ANESTHESIA PREPROCEDURE EVALUATION
Anesthesia Evaluation     Patient summary reviewed and Nursing notes reviewed   no history of anesthetic complications:  NPO Solid Status: > 8 hours  NPO Liquid Status: > 2 hours           Airway   Mallampati: II  TM distance: >3 FB  Neck ROM: full  No difficulty expected  Dental - normal exam     Pulmonary - normal exam    breath sounds clear to auscultation  (+) pleural effusion,   Cardiovascular     Rhythm: irregular  Rate: normal    (+) hypertension, dysrhythmias Atrial Fib, CHF , MCD[place]ELL, hyperlipidemia,       Neuro/Psych- negative ROS  GI/Hepatic/Renal/Endo    (+)  GERD, GI bleeding , thyroid problem hypothyroidism    Musculoskeletal (-) negative ROS    Abdominal  - normal exam   Substance History - negative use     OB/GYN negative ob/gyn ROS         Other - negative ROS                       Anesthesia Plan    ASA 3     MAC     intravenous induction     Anesthetic plan, all risks, benefits, and alternatives have been provided, discussed and informed consent has been obtained with: patient.

## 2020-09-25 NOTE — ANESTHESIA POSTPROCEDURE EVALUATION
Patient: Jordana Hdz    Procedure Summary     Date: 09/25/20 Room / Location:  [facility]    Anesthesia Start: 0758 Anesthesia Stop: 0852    Procedures:       ESOPHAGOGASTRODUODENOSCOPY with APC and biopses (N/A Esophagus)      COLONOSCOPY to cecum into TI with cold biopsy poypectomies (N/A ) Diagnosis:     Surgeon: Jared Cooper MD Provider: Selin Dawkins MD    Anesthesia Type: MAC ASA Status: 3          Anesthesia Type: MAC    Vitals  Vitals Value Taken Time   /81 09/25/20 0910   Temp     Pulse 73 09/25/20 0910   Resp 16 09/25/20 0910   SpO2 94 % 09/25/20 0910           Post Anesthesia Care and Evaluation    Patient location during evaluation: PACU  Patient participation: complete - patient cannot participate  Level of consciousness: awake and alert  Pain management: adequate  Airway patency: patent  Anesthetic complications: No anesthetic complications  PONV Status: none  Cardiovascular status: acceptable  Respiratory status: acceptable  Hydration status: acceptable

## 2020-09-25 NOTE — TELEPHONE ENCOUNTER
Pt called stating she had her EDG/colonoscopy today.  She is due to start back on her Eliquis tomorrow(Saturday 9/26/20)) but Dr. Cooper advised pt to start back on Tuesday(9/29/20).  She wanted to make sure you are okay with this.  Thanks/jlf    # [phone]

## 2020-09-25 NOTE — H&P
Patient Care Team:  Olive Claire MD as PCP - General    Chief complaint Age related colon cancer screening    Subjective     History of Present Illness  The patient presents with no gastrointestinal  Symptoms or issues at this time   Review of Systems   All other systems reviewed and are negative.       Past Medical History:   Diagnosis Date   • Acute congestive heart failure (CMS/HCC)    • Atrial fibrillation (CMS/HCC)    • Atrial flutter (CMS/HCC)    • Disease of thyroid gland    • GERD (gastroesophageal reflux disease)    • Hyperlipidemia    • Hypertension      Past Surgical History:   Procedure Laterality Date   • CATARACT EXTRACTION     • COLONOSCOPY     • ENDOSCOPY     • TUBAL ABDOMINAL LIGATION       Family History   Problem Relation Age of Onset   • Heart attack Father    • Hypertension Father    • Aneurysm Father         brain   • Heart disease Father    • Diabetes Paternal Aunt      Social History     Tobacco Use   • Smoking status: Former Smoker     Packs/day: 1.00     Years: 15.00     Pack years: 15.00     Types: Cigarettes     Quit date:      Years since quittin.7   • Smokeless tobacco: Never Used   • Tobacco comment: caffeine 1-2 cups decaf coffee   Substance Use Topics   • Alcohol use: Not Currently   • Drug use: No     E-cigarette/Vaping     E-cigarette/Vaping Substances     No medications prior to admission.     Allergies:  Patient has no known allergies.    Objective      Vital Signs  Temp:  [97.9 °F (36.6 °C)] 97.9 °F (36.6 °C)  Heart Rate:  [89] 89  Resp:  [16] 16  BP: (162)/(91) 162/91    Physical Exam  HENT:      Right Ear: External ear normal.      Mouth/Throat:      Pharynx: Oropharynx is clear.   Eyes:      Conjunctiva/sclera: Conjunctivae normal.   Pulmonary:      Effort: Pulmonary effort is normal.   Abdominal:      General: Bowel sounds are normal.   Skin:     General: Skin is warm and dry.   Neurological:      Mental Status: She is alert.   Psychiatric:         Mood and  Affect: Mood normal.         Results Review:   I reviewed the patient's new clinical results.      Assessment/Plan       * No active hospital problems. *      Assessment:  (Age related colon cancer screening).     Plan:   (Colonoscopy risks, alternatives and benefits discussed with patient and the patient is agreeable to having procedure done.).       I discussed the patients findings and my recommendations with patient and nursing staff    Jared Cooper MD  09/25/20  08:07 EDT

## 2020-09-25 NOTE — SIGNIFICANT NOTE
Patient states she is ready to go home. Daughter states she feels comfortable taking her home. Patient able to sit on side of bed without difficulty.

## 2020-09-28 LAB
CYTO UR: NORMAL
LAB AP CASE REPORT: NORMAL
PATH REPORT.FINAL DX SPEC: NORMAL
PATH REPORT.GROSS SPEC: NORMAL

## 2020-10-08 ENCOUNTER — OFFICE VISIT (OUTPATIENT)
Dept: CARDIOLOGY | Facility: CLINIC | Age: 80
End: 2020-10-08

## 2020-10-08 VITALS
HEIGHT: 68 IN | BODY MASS INDEX: 28.04 KG/M2 | HEART RATE: 81 BPM | SYSTOLIC BLOOD PRESSURE: 100 MMHG | WEIGHT: 185 LBS | DIASTOLIC BLOOD PRESSURE: 70 MMHG

## 2020-10-08 DIAGNOSIS — E78.2 MIXED HYPERLIPIDEMIA: ICD-10-CM

## 2020-10-08 DIAGNOSIS — I50.32 CHRONIC DIASTOLIC CHF (CONGESTIVE HEART FAILURE) (HCC): ICD-10-CM

## 2020-10-08 DIAGNOSIS — I10 ESSENTIAL HYPERTENSION: ICD-10-CM

## 2020-10-08 DIAGNOSIS — I48.21 PERMANENT ATRIAL FIBRILLATION (HCC): Primary | ICD-10-CM

## 2020-10-08 PROCEDURE — 99214 OFFICE O/P EST MOD 30 MIN: CPT | Performed by: INTERNAL MEDICINE

## 2020-10-08 PROCEDURE — 93000 ELECTROCARDIOGRAM COMPLETE: CPT | Performed by: INTERNAL MEDICINE

## 2020-10-08 NOTE — PROGRESS NOTES
Date of Office Visit: 10/08/20  Encounter Provider: Guicho Pérez MD  Place of Service: [facility] CARDIOLOGY  Patient Name: Jordana Hdz  :1940  Referral Provider:No ref. provider found      Chief Complaint   Patient presents with   • Atrial Fibrillation     History of Present Illness  The patient is a pleasant 78 yo who we saw in 2016 with palpitations it appeared that she had an episode of atrial flutter but that was only episode we discussed further treatment with ablation but she declined.  Had a negative workup at that time including an echocardiogram that was unremarkable.  She was to see us when necessary but then in 2018 developed increasing palpitations came back in the office this time was found to be in atrial flutter she had a repeat echocardiogram that showed normal left ventricular systolic function no significant valvular disease she was started on beta blocker carvedilol and eliquis.  Then felt she was having symptoms from the atrial fibrillation we sent her to the arrhythmia service they discussed with her rhythm control versus cardioversion she really did not want cardioversion so she wore a Holter monitor that showed normal rate control and was kept on that.    She presented to the emergency department on 2019 with worsening shortness of breath that began a couple days prior.  proBNP was within normal range.  Chest x-ray showed minimal left pleural effusion and basilar atelectasis.  She received IV Lasix 40 mg x 1 and was started on by mouth Lasix and potassium replacement.  Just prior to that episode her PCP had stopped her diuretic.   She then was admitted  in the hospital after presenting with shortness of breath.  She was found to have left moderate to large pleural effusion.  Troponin, BNP, procalcitonin were normal range.  She was found to be anemic.  Folate, ferritin and vitamin B-12 were normal.  Iron profile was  abnormal.  Echocardiogram revealed hyperdynamic left ventricular systolic function with an EF of 71%.  There was mild thickening of the aortic valve but no stenosis or regurgitation noted.  She responded well to diuretic.  Follow-up chest x-ray showed decreased pleural effusion and pulmonary did not feel that thoracentesis was needed.  Also had elevated blood pressure on admission which improved.  She had heme positive stool and was to have an outpatient upper and lower scope.   She now comes in for follow up.  She has been doing better.  She did have her endoscopy they apparently treated some bleeding sites as well as had some polyps removed.  She says that she is now to keep that weight off and she is weighing daily without problems.  No chest pain or pressure gets some occasional dizziness.  No palpitations no near syncope or syncope.  Has some fatigue.  But again overall she feels like she is doing better.    Hypertension  Associated symptoms include malaise/fatigue. Pertinent negatives include no blurred vision or headaches.         Past Medical History:   Diagnosis Date   • Acute congestive heart failure (CMS/HCC)    • Atrial fibrillation (CMS/HCC)    • Atrial flutter (CMS/HCC)    • Disease of thyroid gland    • GERD (gastroesophageal reflux disease)    • Hyperlipidemia    • Hypertension          Past Surgical History:   Procedure Laterality Date   • CATARACT EXTRACTION     • COLONOSCOPY     • COLONOSCOPY N/A 9/25/2020    Procedure: COLONOSCOPY to cecum into TI with cold biopsy poypectomies;  Surgeon: Jared Cooper MD;  Location: [facility] ENDOSCOPY;  Service: Gastroenterology;  Laterality: N/A;  pre: iron deficiency anemia  post: polyps, diverticulosis   • ENDOSCOPY     • ENDOSCOPY N/A 9/25/2020    Procedure: ESOPHAGOGASTRODUODENOSCOPY with APC and biopses;  Surgeon: Jared Cooper MD;  Location: [facility] ENDOSCOPY;  Service: Gastroenterology;  Laterality: N/A;  pre: iron deficiency anemia  post: hiatal  hernia, gastritis, watermelon stomach   • TUBAL ABDOMINAL LIGATION           Current Outpatient Medications on File Prior to Visit   Medication Sig Dispense Refill   • apixaban (ELIQUIS) 5 MG tablet tablet Take 5 mg by mouth 2 (Two) Times a Day.     • atorvastatin (LIPITOR) 40 MG tablet Take 40 mg by mouth Daily.     • carvedilol (COREG) 25 MG tablet Take 25 mg by mouth 2 (Two) Times a Day With Meals.     • cetirizine (zyrTEC) 10 MG tablet Take 10 mg by mouth Daily As Needed.     • digoxin (LANOXIN) 125 MCG tablet Take 1 tablet by mouth Daily. 30 tablet 11   • furosemide (LASIX) 40 MG tablet Take 1 tablet by mouth Daily for 30 days. 90 tablet 2   • levothyroxine (SYNTHROID, LEVOTHROID) 25 MCG tablet Take 25 mcg by mouth Daily.     • omeprazole (priLOSEC) 20 MG capsule Take 20 mg by mouth Daily.     • potassium chloride (K-DUR) 10 MEQ CR tablet Take 2 tablets twice daily 120 tablet 2   • sertraline (ZOLOFT) 50 MG tablet Take  by mouth daily.       No current facility-administered medications on file prior to visit.          Social History     Socioeconomic History   • Marital status:      Spouse name: Not on file   • Number of children: Not on file   • Years of education: Not on file   • Highest education level: Not on file   Tobacco Use   • Smoking status: Former Smoker     Packs/day: 1.00     Years: 15.00     Pack years: 15.00     Types: Cigarettes     Quit date:      Years since quittin.8   • Smokeless tobacco: Never Used   • Tobacco comment: caffeine 1-2 cups decaf coffee   Substance and Sexual Activity   • Alcohol use: Not Currently   • Drug use: No   • Sexual activity: Defer   Lifestyle   • Physical activity     Days per week: 0 days     Minutes per session: 0 min   • Stress: To some extent       Family History   Problem Relation Age of Onset   • Heart attack Father    • Hypertension Father    • Aneurysm Father         brain   • Heart disease Father    • Diabetes Paternal Aunt          Review  "of Systems   Constitution: Positive for malaise/fatigue. Negative for decreased appetite, diaphoresis, fever, weight gain and weight loss.   HENT: Negative for congestion, hearing loss, nosebleeds and tinnitus.    Eyes: Negative for blurred vision, double vision, vision loss in left eye, vision loss in right eye and visual disturbance.   Cardiovascular:        As noted in HPI   Respiratory:        As noted HPI   Endocrine: Negative for cold intolerance and heat intolerance.   Hematologic/Lymphatic: Negative for bleeding problem. Does not bruise/bleed easily.   Skin: Negative for color change, flushing, itching and rash.   Musculoskeletal: Positive for joint pain. Negative for arthritis, back pain, joint swelling, muscle weakness and myalgias.   Gastrointestinal: Negative for bloating, abdominal pain, constipation, diarrhea, dysphagia, heartburn, hematemesis, hematochezia, melena, nausea and vomiting.   Genitourinary: Negative for bladder incontinence, dysuria, frequency, nocturia and urgency.   Neurological: Positive for dizziness. Negative for focal weakness, headaches, light-headedness, loss of balance, numbness, paresthesias, vertigo and weakness.   Psychiatric/Behavioral: Negative for depression, memory loss and substance abuse.       Procedures      ECG 12 Lead    Date/Time: 10/8/2020 10:30 AM  Performed by: Guicho Pérez MD  Authorized by: Guicho Pérez MD   Comparison: compared with previous ECG   Similar to previous ECG  Rhythm: atrial fibrillation  Rate: normal  QRS axis: normal                  Objective:    /70   Pulse 81   Ht 172.7 cm (68\")   Wt 83.9 kg (185 lb)   BMI 28.13 kg/m²        Physical Exam  Vitals signs reviewed.   Constitutional:       General: Not in acute distress.     Appearance: Well-developed. Not diaphoretic.   Eyes:      General: No scleral icterus.     Conjunctiva/sclera: Conjunctivae normal.      Pupils: Pupils are equal, round, and reactive to light.   HENT:      " Head: Normocephalic.   Neck:      Musculoskeletal: No edema or erythema.      Thyroid: No thyromegaly.      Vascular: Normal carotid pulses. No carotid bruit, hepatojugular reflux or JVD.      Trachea: No tracheal deviation.   Pulmonary:      Effort: Pulmonary effort is normal. No respiratory distress.      Breath sounds: Normal breath sounds. No decreased breath sounds. No wheezing. No rhonchi. No rales.   Chest:      Chest wall: Not tender to palpatation.   Cardiovascular:      PMI at left midclavicular line. Normal rate. Irregularly irregular rhythm. S1 with normal intensity. S2 with normal intensity.      No gallop. No click. No rub.   Pulses:     Intact distal pulses.   Edema:     Peripheral edema absent.   Abdominal:      General: Bowel sounds are normal. There is no distension.      Palpations: Abdomen is soft. There is no abdominal mass.      Tenderness: There is no abdominal tenderness. There is no guarding or rebound.   Musculoskeletal:         General: No tenderness or deformity.      Extremities: No clubbing present.  Skin:     General: Skin is warm and dry. There is no cyanosis.      Coloration: Skin is not pale.      Findings: No erythema or rash.   Neurological:      Mental Status: Alert and oriented to person, place, and time.   Psychiatric:         Speech: Speech normal.         Behavior: Behavior normal.         Thought Content: Thought content normal.         Judgment: Judgment normal.             Assessment:   1.  79 showed female with paroxysmal atrial flutter and atrial fibrillation. The patient's CHADS2-VASc score is 2.  Rate is improved.  On anticoagulation continue the same.  2.  Hyperlipidemia on atorvastatin.   3.  Hypertension blood pressure under adequate control.  If anything low side  4.  Heart failure preserved ventricular ejection fraction is episodes may been precipitated by her atrial fibrillation, diastolic dysfunction, and/or anemia.  She is stable now and she is getting continue  to weigh daily if she gains 2 or 3 pounds in a day will take an extra diuretic.  She will follow-up with us in 6 months and call if problems.       Plan:

## 2020-10-12 RX ORDER — FUROSEMIDE 40 MG/1
40 TABLET ORAL DAILY
Qty: 90 TABLET | Refills: 1 | Status: SHIPPED | OUTPATIENT
Start: 2020-10-12 | End: 2021-06-08

## 2020-10-12 RX ORDER — FUROSEMIDE 20 MG/1
20 TABLET ORAL DAILY
Qty: 90 TABLET | Refills: 1
Start: 2020-10-12

## 2020-12-01 RX ORDER — POTASSIUM CHLORIDE 750 MG/1
TABLET, FILM COATED, EXTENDED RELEASE ORAL
Qty: 360 TABLET | Refills: 1 | Status: SHIPPED | OUTPATIENT
Start: 2020-12-01 | End: 2021-05-25

## 2020-12-30 ENCOUNTER — OFFICE VISIT (OUTPATIENT)
Dept: CARDIOLOGY | Facility: CLINIC | Age: 80
End: 2020-12-30

## 2020-12-30 VITALS
WEIGHT: 180.4 LBS | SYSTOLIC BLOOD PRESSURE: 94 MMHG | BODY MASS INDEX: 27.34 KG/M2 | DIASTOLIC BLOOD PRESSURE: 62 MMHG | HEART RATE: 103 BPM | HEIGHT: 68 IN

## 2020-12-30 DIAGNOSIS — I50.32 CHRONIC DIASTOLIC CHF (CONGESTIVE HEART FAILURE) (HCC): ICD-10-CM

## 2020-12-30 DIAGNOSIS — I10 ESSENTIAL HYPERTENSION: ICD-10-CM

## 2020-12-30 DIAGNOSIS — I48.21 PERMANENT ATRIAL FIBRILLATION (HCC): Primary | ICD-10-CM

## 2020-12-30 DIAGNOSIS — E78.2 MIXED HYPERLIPIDEMIA: ICD-10-CM

## 2020-12-30 PROCEDURE — 93000 ELECTROCARDIOGRAM COMPLETE: CPT | Performed by: INTERNAL MEDICINE

## 2020-12-30 PROCEDURE — 99214 OFFICE O/P EST MOD 30 MIN: CPT | Performed by: INTERNAL MEDICINE

## 2020-12-30 NOTE — PROGRESS NOTES
Date of Office Visit: 20  Encounter Provider: Guicho Pérez MD  Place of Service: [facility] CARDIOLOGY  Patient Name: Jordana Hdz  :1940  Referral Provider:No ref. provider found      No chief complaint on file.    History of Present Illness  The patient is a pleasant 81 yo who we saw in 2016 with palpitations it appeared that she had an episode of atrial flutter but that was only episode we discussed further treatment with ablation but she declined.  Had a negative workup at that time including an echocardiogram that was unremarkable.  She was to see us when necessary but then in 2018 developed increasing palpitations came back in the office this time was found to be in atrial flutter she had a repeat echocardiogram that showed normal left ventricular systolic function no significant valvular disease she was started on beta blocker carvedilol and eliquis.  Then felt she was having symptoms from the atrial fibrillation we sent her to the arrhythmia service they discussed with her rhythm control versus cardioversion she really did not want cardioversion so she wore a Holter monitor that showed normal rate control and was kept on that.    She presented to the emergency department on 2019 with worsening shortness of breath that began a couple days prior.  proBNP was within normal range.  Chest x-ray showed minimal left pleural effusion and basilar atelectasis.  She received IV Lasix 40 mg x 1 and was started on by mouth Lasix and potassium replacement.  Just prior to that episode her PCP had stopped her diuretic.   She then was admitted  in the hospital after presenting with shortness of breath.  She was found to have left moderate to large pleural effusion.  Troponin, BNP, procalcitonin were normal range.  She was found to be anemic.  Folate, ferritin and vitamin B-12 were normal.  Iron profile was abnormal.  Echocardiogram revealed  hyperdynamic left ventricular systolic function with an EF of 71%.  There was mild thickening of the aortic valve but no stenosis or regurgitation noted.  She responded well to diuretic.  Follow-up chest x-ray showed decreased pleural effusion and pulmonary did not feel that thoracentesis was needed.  Also had elevated blood pressure on admission which improved.  She had heme positive stool and was to have an outpatient upper and lower scope.   She now comes in for follow up.  Unfortunately she still very tired and fatigued ever since she has been in A. fib and has been a little more out of breath with activity.  No real chest pain or pressure no near syncope or syncope.  Of her blood pressure has been low.    Hypertension  Associated symptoms include malaise/fatigue. Pertinent negatives include no blurred vision or headaches.         Past Medical History:   Diagnosis Date   • Acute congestive heart failure (CMS/HCC)    • Atrial fibrillation (CMS/HCC)    • Atrial flutter (CMS/HCC)    • Disease of thyroid gland    • GERD (gastroesophageal reflux disease)    • Hyperlipidemia    • Hypertension          Past Surgical History:   Procedure Laterality Date   • CATARACT EXTRACTION     • COLONOSCOPY     • COLONOSCOPY N/A 9/25/2020    Procedure: COLONOSCOPY to cecum into TI with cold biopsy poypectomies;  Surgeon: Jared Cooper MD;  Location: [facility] ENDOSCOPY;  Service: Gastroenterology;  Laterality: N/A;  pre: iron deficiency anemia  post: polyps, diverticulosis   • ENDOSCOPY     • ENDOSCOPY N/A 9/25/2020    Procedure: ESOPHAGOGASTRODUODENOSCOPY with APC and biopses;  Surgeon: Jared Cooper MD;  Location: [facility] ENDOSCOPY;  Service: Gastroenterology;  Laterality: N/A;  pre: iron deficiency anemia  post: hiatal hernia, gastritis, watermelon stomach   • TUBAL ABDOMINAL LIGATION           Current Outpatient Medications on File Prior to Visit   Medication Sig Dispense Refill   • amoxicillin-clavulanate (AUGMENTIN) 703-386  MG per tablet Take 1 tablet by mouth 2 (Two) Times a Day. 20 tablet 0   • apixaban (ELIQUIS) 5 MG tablet tablet Take 5 mg by mouth 2 (Two) Times a Day.     • atorvastatin (LIPITOR) 40 MG tablet Take 40 mg by mouth Daily.     • carvedilol (COREG) 25 MG tablet Take 25 mg by mouth 2 (Two) Times a Day With Meals.     • cetirizine (zyrTEC) 10 MG tablet Take 10 mg by mouth Daily As Needed.     • digoxin (LANOXIN) 125 MCG tablet Take 1 tablet by mouth Daily. 30 tablet 11   • furosemide (LASIX) 40 MG tablet Take 1 tablet by mouth Daily. 90 tablet 1   • levothyroxine (SYNTHROID, LEVOTHROID) 25 MCG tablet Take 25 mcg by mouth Daily.     • omeprazole (priLOSEC) 20 MG capsule Take 20 mg by mouth Daily.     • potassium chloride 10 MEQ CR tablet TAKE 2 TABLETS BY MOUTH TWICE A  tablet 1   • sertraline (ZOLOFT) 50 MG tablet Take  by mouth daily.       No current facility-administered medications on file prior to visit.          Social History     Socioeconomic History   • Marital status:      Spouse name: Not on file   • Number of children: Not on file   • Years of education: Not on file   • Highest education level: Not on file   Tobacco Use   • Smoking status: Former Smoker     Packs/day: 1.00     Years: 15.00     Pack years: 15.00     Types: Cigarettes     Quit date:      Years since quittin.0   • Smokeless tobacco: Never Used   • Tobacco comment: caffeine 1-2 cups decaf coffee   Substance and Sexual Activity   • Alcohol use: Not Currently   • Drug use: No   • Sexual activity: Defer   Lifestyle   • Physical activity     Days per week: 0 days     Minutes per session: 0 min   • Stress: To some extent       Family History   Problem Relation Age of Onset   • Heart attack Father    • Hypertension Father    • Aneurysm Father         brain   • Heart disease Father    • Diabetes Paternal Aunt          Review of Systems   Constitution: Positive for malaise/fatigue. Negative for decreased appetite, diaphoresis,  fever, weight gain and weight loss.   HENT: Negative for congestion, hearing loss, nosebleeds and tinnitus.    Eyes: Negative for blurred vision, double vision, vision loss in left eye, vision loss in right eye and visual disturbance.   Cardiovascular:        As noted in HPI   Respiratory:        As noted HPI   Endocrine: Negative for cold intolerance and heat intolerance.   Hematologic/Lymphatic: Negative for bleeding problem. Does not bruise/bleed easily.   Skin: Negative for color change, flushing, itching and rash.   Musculoskeletal: Negative for arthritis, back pain, joint pain, joint swelling, muscle weakness and myalgias.   Gastrointestinal: Negative for bloating, abdominal pain, constipation, diarrhea, dysphagia, heartburn, hematemesis, hematochezia, melena, nausea and vomiting.   Genitourinary: Negative for bladder incontinence, dysuria, frequency, nocturia and urgency.   Neurological: Negative for dizziness, focal weakness, headaches, light-headedness, loss of balance, numbness, paresthesias, vertigo and weakness.   Psychiatric/Behavioral: Negative for depression, memory loss and substance abuse.       Procedures      ECG 12 Lead    Date/Time: 12/30/2020 1:42 PM  Performed by: Guicho Pérez MD  Authorized by: Guicho Pérez MD   Comparison: compared with previous ECG   Similar to previous ECG  Rhythm: atrial fibrillation  Rate: tachycardic  QRS axis: normal  Other findings: low voltage                Objective:    There were no vitals taken for this visit.       Physical Exam  Vitals signs reviewed.   Constitutional:       General: Not in acute distress.     Appearance: Well-developed. Not diaphoretic.   Eyes:      General: No scleral icterus.     Conjunctiva/sclera: Conjunctivae normal.      Pupils: Pupils are equal, round, and reactive to light.   HENT:      Head: Normocephalic.   Neck:      Musculoskeletal: No edema or erythema.      Thyroid: No thyromegaly.      Vascular: Normal carotid  pulses. No carotid bruit, hepatojugular reflux or JVD.      Trachea: No tracheal deviation.   Pulmonary:      Effort: Pulmonary effort is normal. No respiratory distress.      Breath sounds: Normal breath sounds. No decreased breath sounds. No wheezing. No rhonchi. No rales.   Chest:      Chest wall: Not tender to palpatation.   Cardiovascular:      PMI at left midclavicular line. Tachycardia present. Regularly irregular rhythm. S1 with normal intensity. S2 with normal intensity.      Murmurs: There is no murmur.      No gallop. No click. No rub.   Pulses:     Intact distal pulses.   Edema:     Peripheral edema absent.   Abdominal:      General: Bowel sounds are normal. There is no distension.      Palpations: Abdomen is soft. There is no abdominal mass.      Tenderness: There is no abdominal tenderness. There is no guarding or rebound.   Musculoskeletal:         General: No tenderness or deformity.      Extremities: No clubbing present.  Skin:     General: Skin is warm and dry. There is no cyanosis.      Coloration: Skin is not pale.      Findings: No erythema or rash.   Neurological:      Mental Status: Alert and oriented to person, place, and time.   Psychiatric:         Speech: Speech normal.         Behavior: Behavior normal.         Thought Content: Thought content normal.         Judgment: Judgment normal.             Assessment:   1.  80 showed female with paroxysmal atrial flutter and atrial fibrillation. The patient's CHADS2-VASc score is 2.  On anticoagulation continue the same.  Her rate is still little increased.  2.  Hyperlipidemia on atorvastatin.   3.  Hypertension blood pressure under adequate control.  If anything low side  4.  Heart failure preserved ventricular ejection fraction is episodes may been precipitated by her atrial fibrillation, diastolic dysfunction, and/or anemia.  She is stable now and she is getting continue to weigh daily if she gains 2 or 3 pounds in a day will take an extra  diuretic.  Her weights have actually been relatively stable I do go up and down some.  5.  Fatigue weakness and shortness of breath I can help but believe that this is related to her A. fib and she may still be tachycardic and/or her low blood pressure.  1 option would be to switch her beta-blocker other than that I am not sure what else to do we can refer her back to arrhythmia service.       Plan:

## 2021-03-02 DIAGNOSIS — Z23 IMMUNIZATION DUE: ICD-10-CM

## 2021-03-22 RX ORDER — CARVEDILOL 25 MG/1
25 TABLET ORAL 2 TIMES DAILY WITH MEALS
Qty: 180 TABLET | Refills: 2 | Status: SHIPPED | OUTPATIENT
Start: 2021-03-22 | End: 2021-05-26

## 2021-03-24 RX ORDER — APIXABAN 5 MG/1
TABLET, FILM COATED ORAL
Qty: 60 TABLET | Refills: 11 | Status: SHIPPED | OUTPATIENT
Start: 2021-03-24 | End: 2022-03-14

## 2021-04-08 ENCOUNTER — OFFICE VISIT (OUTPATIENT)
Dept: CARDIOLOGY | Facility: CLINIC | Age: 81
End: 2021-04-08

## 2021-04-08 VITALS
BODY MASS INDEX: 26.22 KG/M2 | WEIGHT: 173 LBS | DIASTOLIC BLOOD PRESSURE: 72 MMHG | HEART RATE: 80 BPM | HEIGHT: 68 IN | SYSTOLIC BLOOD PRESSURE: 128 MMHG

## 2021-04-08 DIAGNOSIS — I10 ESSENTIAL HYPERTENSION: ICD-10-CM

## 2021-04-08 DIAGNOSIS — I48.21 PERMANENT ATRIAL FIBRILLATION (HCC): Primary | ICD-10-CM

## 2021-04-08 DIAGNOSIS — I48.19 PERSISTENT ATRIAL FIBRILLATION (HCC): ICD-10-CM

## 2021-04-08 DIAGNOSIS — R42 DIZZINESS: ICD-10-CM

## 2021-04-08 PROCEDURE — 99214 OFFICE O/P EST MOD 30 MIN: CPT | Performed by: NURSE PRACTITIONER

## 2021-04-08 PROCEDURE — 93000 ELECTROCARDIOGRAM COMPLETE: CPT | Performed by: NURSE PRACTITIONER

## 2021-04-08 RX ORDER — ROSUVASTATIN CALCIUM 40 MG/1
40 TABLET, COATED ORAL DAILY
COMMUNITY
Start: 2021-02-12

## 2021-04-08 NOTE — PROGRESS NOTES
Date of Office Visit: 21  Encounter Provider: ESTEVAN Zafar  Place of Service: [facility] CARDIOLOGY  Patient Name: Jordana Hdz  :1940    Chief Complaint   Patient presents with   • Persistent atrial fibrillation   :     HPI: Jordana Hdz is a 80 y.o. female  with history of hypertension, hyperlipidemia, congestive heart failure, and atrial fibrillation/flutter. She is followed by Dr. Pérez and I will see her in follow up today      She has a history of palpitation and had 1 episode of atrial flutter.  Ablation was discussed but it was declined.  She also had PVCs.  She was last seen in the office in 2016.  She had an occasional skipped beat sensation which was rare but no fluttering sensation.     She had an ER visit ion 2017 with complaint of dizziness and a near syncope episdoe which had resolved upon arrival. She also reported the week prior having anepisode of rapid palpitation which resolved quickly. TSH was elevated, Cr0.68, GFR84. EKG NSR.  In 2018 she presented and was in atrial fibrillation with a heart rate of 115.  Carvedilol was increased to 12.5 mg twice daily and she was to start Eliquis.  At that time she was taking an antibiotic for salivary gland infection.  At her 1 month follow-up she continued to have symptoms of increased shortness of breath with activity but no palpitations.  Her heart rate was elevated at 112  so we increase carvedilol to 25 mg twice daily.  She was evaluated by Dr. Smalls and discuss rate versus rhythm control strategy.  24-hour Holter completed 10/2/2018 showed average heart rate 92.  She did not want cardioversion so rate control and anticoagulation was continued.     She presented to the emergency department on 2019 with worsening shortness of breath.  proBNP was within normal range.  Chest x-ray showed minimal left pleural effusion and basilar atelectasis.  She received IV Lasix 40  "mg x 1 and was started on by mouth Lasix and potassium replacement. Indapamide had been stopped approximately one month prior.follow up BMP was stable and she was continued on furosemide.      Then in 05/2020, she reported heart rates as high as 130 bpm but blood pressure was low and she was started on digoxin 125 mcg daily.      She then was admitted in the hospital after presenting with shortness of breath.  She was found to have left moderate to large pleural effusion.  Troponin, BNP, procalcitonin were normal range.  She was found to be anemic.  Folate, ferritin and vitamin B-12 were normal.  Iron profile was abnormal.  Echocardiogram revealed hyperdynamic left ventricular systolic function with an EF of 71%.  There was mild thickening of the aortic valve but no stenosis or regurgitation noted.  She responded well to diuretic.  Follow-up chest x-ray showed decreased pleural effusion and pulmonary did not feel that thoracentesis was needed.  Also had elevated blood pressure on admission which improved.  She had heme positive stool and was to have an outpatient upper and lower scope.  Outpatient chest x-ray showed resolution of pleural effusion    She presents for 6-month reassessment.  She continues to have dizziness.  This is not a new symptom but more frequent.  She has dizziness with position changes but also dizziness at sporadic moments just sitting there.  She checked her blood pressure yesterday during the dizzy spell which was 100/57.  She has been out of routine checking her blood pressure consistently.  She denies chest pain, shortness of breath, near-syncope or syncope.          No Known Allergies        Family and social history reviewed.     ROS  All other systems were reviewed and are negative          Objective:     Vitals:    04/08/21 1103   BP: 128/72   BP Location: Left arm   Patient Position: Sitting   Pulse: 80   Weight: 78.5 kg (173 lb)   Height: 172.7 cm (68\")     Body mass index is 26.3 " kg/m².    PHYSICAL EXAM:  Constitutional:       General: Not in acute distress.     Appearance: Well-developed. Not diaphoretic.   HENT:      Head: Normocephalic.   Pulmonary:      Effort: Pulmonary effort is normal. No respiratory distress.      Breath sounds: Normal breath sounds. No wheezing. No rhonchi. No rales.   Cardiovascular:      Normal rate. Irregularly irregular rhythm.      Comments: Puffy ankles but no pitting edema.  Pulses:     Radial: 2+ bilaterally.  Skin:     General: Skin is warm and dry. There is no cyanosis.      Findings: No rash.   Neurological:      Mental Status: Alert and oriented to person, place, and time.   Psychiatric:         Behavior: Behavior normal.         Thought Content: Thought content normal.         Judgment: Judgment normal.           ECG 12 Lead    Date/Time: 4/8/2021 11:25 AM  Performed by: Vilma Patterosn APRN  Authorized by: Vilma Patterson APRN   Comparison: compared with previous ECG   Similar to previous ECG  Rhythm: atrial fibrillation  Rate: normal    Clinical impression: abnormal EKG              Current Outpatient Medications   Medication Sig Dispense Refill   • carvedilol (COREG) 25 MG tablet Take 1 tablet by mouth 2 (Two) Times a Day With Meals. 180 tablet 2   • cetirizine (zyrTEC) 10 MG tablet Take 10 mg by mouth Daily As Needed.     • digoxin (LANOXIN) 125 MCG tablet Take 1 tablet by mouth Daily. 30 tablet 11   • Eliquis 5 MG tablet tablet TAKE 1 TABLET BY MOUTH EVERY 12 HOURS 60 tablet 11   • furosemide (LASIX) 40 MG tablet Take 1 tablet by mouth Daily. 90 tablet 1   • levothyroxine (SYNTHROID, LEVOTHROID) 25 MCG tablet Take one and one half tablets by mouth every morning     • omeprazole (priLOSEC) 20 MG capsule Take 20 mg by mouth Daily.     • potassium chloride 10 MEQ CR tablet TAKE 2 TABLETS BY MOUTH TWICE A  tablet 1   • rosuvastatin (CRESTOR) 40 MG tablet Take 40 mg by mouth Daily.     • sertraline (ZOLOFT) 50 MG tablet Take  by mouth daily.        No current facility-administered medications for this visit.     Assessment:       Diagnosis Plan   1. Permanent atrial fibrillation (CMS/HCC)     2. Essential hypertension     3. Persistent atrial fibrillation (CMS/HCC)          No orders of the defined types were placed in this encounter.        Plan:       1.  80-year-old female with paroxysmal atrial fibrillation. CHADS2-VASc score is 2 anticoagulated with Eliquis. Digoxin level stable. Rate controlled.  If her blood pressure continues to run low may need to try atenolol.  She also may benefit from EP evaluation  2.Hyperlipidemia on target dose atorvastatin  3. Hypertension blood pressure appears stable but occasionally drops low.  She is to follow her blood pressure and heart rate over the next couple weeks.  4. Suspected sleep apnea she has no desire to really be tested  5.Hypothyroidism on replacement therapy  6.history of premature ventricular contraction  7.  Heart failure preserved ventricular ejection fraction.  She appears euvolemic on furosemide 40 mg daily.  She checks her weights routinely and knows to call with significant weight gain  8. GERD on therapy  9.  Dizziness/fatigue-her blood pressure is on the low side but she is also borderline tachycardic at times.  As above she is going to check blood pressure and heart rate.  She is also unaware of 48-hour Holter.  May need to try atenolol as opposed to carvedilol and if unsuccessful with improving her symptoms consider EP evaluation            It has been a pleasure to participate in this patient's care.      Thank you,  ESTEVAN Zafar      **I used Dragon to dictate this note:**

## 2021-05-25 RX ORDER — POTASSIUM CHLORIDE 750 MG/1
TABLET, FILM COATED, EXTENDED RELEASE ORAL
Qty: 360 TABLET | Refills: 3 | Status: SHIPPED | OUTPATIENT
Start: 2021-05-25 | End: 2021-05-26 | Stop reason: SDUPTHER

## 2021-05-26 ENCOUNTER — TELEPHONE (OUTPATIENT)
Dept: CARDIOLOGY | Facility: CLINIC | Age: 81
End: 2021-05-26

## 2021-05-26 RX ORDER — POTASSIUM CHLORIDE 750 MG/1
30 TABLET, FILM COATED, EXTENDED RELEASE ORAL 2 TIMES DAILY
Qty: 360 TABLET | Refills: 3
Start: 2021-05-26 | End: 2022-07-18

## 2021-05-26 RX ORDER — ATENOLOL 25 MG/1
25 TABLET ORAL 2 TIMES DAILY
Qty: 60 TABLET | Refills: 5 | Status: SHIPPED | OUTPATIENT
Start: 2021-05-26 | End: 2021-11-17 | Stop reason: SDUPTHER

## 2021-05-26 NOTE — TELEPHONE ENCOUNTER
Monitor showed average HR in a fib controlled at 75. No pauses or blocks noted.  Blood pressure has been 92/53. Will stop carvedilol and try atenolol. She was telling me potassium was 5.4 on recent labs with PCP.  Her potassium was decreased she was taking forty mmeq BIS and is now taking 30 MEq BID.  she is overdue for her recheck and advised that she have that done sometime this week for her PCP.    Please call PCP to get copy of most recent labs.

## 2021-06-02 ENCOUNTER — TELEPHONE (OUTPATIENT)
Dept: CARDIOLOGY | Facility: CLINIC | Age: 81
End: 2021-06-02

## 2021-06-02 NOTE — TELEPHONE ENCOUNTER
Called to follow-up on blood pressure which is running 110-125/60-70. She has more energy and less dizziness. HR 60-80.  She will continue on current dose atenolol.      I am going to transition her to Dr. Moss.  She will need an appointment with Dr. Moss in 6 months. please call her to discuss

## 2021-06-07 RX ORDER — DIGOXIN 125 MCG
125 TABLET ORAL DAILY
Qty: 30 TABLET | Refills: 6 | Status: SHIPPED | OUTPATIENT
Start: 2021-06-07 | End: 2021-08-26 | Stop reason: SDUPTHER

## 2021-06-08 RX ORDER — FUROSEMIDE 40 MG/1
TABLET ORAL
Qty: 90 TABLET | Refills: 1 | Status: SHIPPED | OUTPATIENT
Start: 2021-06-08 | End: 2021-12-30 | Stop reason: SDUPTHER

## 2021-07-29 RX ORDER — DIGOXIN 125 MCG
125 TABLET ORAL DAILY
Qty: 30 TABLET | Refills: 6 | OUTPATIENT
Start: 2021-07-29

## 2021-07-29 NOTE — TELEPHONE ENCOUNTER
Refused, this was sent in 6/7/2021 30 day supply with 6 refills.    Normal rate, regular rhythm.  Heart sounds S1, S2.  No murmurs, rubs or gallops.

## 2021-08-26 RX ORDER — DIGOXIN 125 MCG
125 TABLET ORAL DAILY
Qty: 30 TABLET | Refills: 11 | Status: SHIPPED | OUTPATIENT
Start: 2021-08-26 | End: 2021-08-30 | Stop reason: SDUPTHER

## 2021-08-31 RX ORDER — DIGOXIN 125 MCG
125 TABLET ORAL DAILY
Qty: 90 TABLET | Refills: 3 | Status: SHIPPED | OUTPATIENT
Start: 2021-08-31 | End: 2022-09-19

## 2021-11-17 RX ORDER — ATENOLOL 25 MG/1
25 TABLET ORAL 2 TIMES DAILY
Qty: 180 TABLET | Refills: 2 | Status: SHIPPED | OUTPATIENT
Start: 2021-11-17 | End: 2022-08-10

## 2021-12-17 ENCOUNTER — OFFICE VISIT (OUTPATIENT)
Dept: CARDIOLOGY | Facility: CLINIC | Age: 81
End: 2021-12-17

## 2021-12-17 ENCOUNTER — TELEPHONE (OUTPATIENT)
Dept: CARDIOLOGY | Facility: CLINIC | Age: 81
End: 2021-12-17

## 2021-12-17 VITALS
WEIGHT: 169 LBS | HEIGHT: 68 IN | SYSTOLIC BLOOD PRESSURE: 124 MMHG | BODY MASS INDEX: 25.61 KG/M2 | DIASTOLIC BLOOD PRESSURE: 80 MMHG | OXYGEN SATURATION: 99 % | HEART RATE: 69 BPM | RESPIRATION RATE: 16 BRPM

## 2021-12-17 DIAGNOSIS — Z79.01 LONG TERM (CURRENT) USE OF ANTICOAGULANTS: ICD-10-CM

## 2021-12-17 DIAGNOSIS — I50.32 CHRONIC DIASTOLIC CHF (CONGESTIVE HEART FAILURE) (HCC): ICD-10-CM

## 2021-12-17 DIAGNOSIS — I48.21 PERMANENT ATRIAL FIBRILLATION (HCC): Primary | ICD-10-CM

## 2021-12-17 DIAGNOSIS — I10 ESSENTIAL HYPERTENSION: ICD-10-CM

## 2021-12-17 DIAGNOSIS — E78.2 MIXED HYPERLIPIDEMIA: ICD-10-CM

## 2021-12-17 PROCEDURE — 99214 OFFICE O/P EST MOD 30 MIN: CPT | Performed by: INTERNAL MEDICINE

## 2021-12-17 PROCEDURE — 93000 ELECTROCARDIOGRAM COMPLETE: CPT | Performed by: INTERNAL MEDICINE

## 2021-12-17 NOTE — PROGRESS NOTES
CARDIOLOGY    Evelyn Moss MD    ENCOUNTER DATE:  12/17/2021    Jordana Hdz / 81 y.o. / female        CHIEF COMPLAINT / REASON FOR OFFICE VISIT     Atrial Fibrillation (6 month )      HISTORY OF PRESENT ILLNESS       HPI    Jordana Hdz is a 81 y.o. female     This is a patient who previously followed with Dr. Pérez.  She has hypertension, hyperlipidemia and paroxysmal atrial fibrillation.  This is generally been managed medically.  Procedure such as ablation and cardioversion have been discussed in the past but she has always declined.  Her most recent echo was in August 2020 showing an ejection fraction of 71%, trace mitral regurgitation, normal right ventricular systolic pressure.  48-hour Holter monitor May 2021 which showed that she was in atrial fibrillation with an average heart rate of 75 bpm.    She reports feeling well.  She has occasional palpitations that last maybe 20 minutes but not frequently.  Her blood pressure is doing much better since her medications were adjusted.  No chest pain or shortness of breath.  She does not wear her oxygen at night but her Fitbit says her sats only get as low as 94w.      REVIEW OF SYSTEMS     Review of Systems   Constitutional: Negative for chills, fever, weight gain and weight loss.   Cardiovascular: Negative for leg swelling.   Respiratory: Positive for snoring. Negative for cough and wheezing.    Hematologic/Lymphatic: Negative for bleeding problem. Does not bruise/bleed easily.   Skin: Negative for color change.   Musculoskeletal: Positive for joint pain. Negative for falls and myalgias.   Gastrointestinal: Negative for melena.   Genitourinary: Negative for hematuria.   Neurological: Negative for excessive daytime sleepiness.   Psychiatric/Behavioral: Negative for depression. The patient is not nervous/anxious.          VITAL SIGNS     Visit Vitals  /80 (BP Location: Right arm, Patient Position: Sitting, Cuff Size: Adult)   Pulse 69  "  Resp 16   Ht 172.7 cm (68\")   Wt 76.7 kg (169 lb)   SpO2 99%   BMI 25.70 kg/m²         Wt Readings from Last 3 Encounters:   12/17/21 76.7 kg (169 lb)   04/08/21 78.5 kg (173 lb)   12/30/20 81.8 kg (180 lb 6.4 oz)     Body mass index is 25.7 kg/m².      PHYSICAL EXAMINATION     Constitutional:       General: Not in acute distress.  Neck:      Vascular: No carotid bruit or JVD.   Pulmonary:      Effort: Pulmonary effort is normal.      Breath sounds: Normal breath sounds.   Cardiovascular:      Normal rate. Regularly irregular rhythm.      Murmurs: There is no murmur.   Psychiatric:         Mood and Affect: Mood and affect normal.           REVIEWED DATA       ECG 12 Lead    Date/Time: 12/17/2021 11:37 AM  Performed by: Evelyn Moss MD  Authorized by: Evelyn Moss MD   Comparison: compared with previous ECG from 4/8/2021  Rhythm: atrial fibrillation  BPM: 69  Conduction: conduction normal  ST Segments: ST segments normal  T Waves: T waves normal                    Lab Results   Component Value Date    GLUCOSE 121 (H) 09/04/2020    BUN 17 09/04/2020    CREATININE 0.88 09/04/2020    EGFRIFNONA 62 09/04/2020    BCR 19.3 09/04/2020    K 4.5 09/04/2020    CO2 25.8 09/04/2020    CALCIUM 9.0 09/04/2020    ALBUMIN 3.40 (L) 08/16/2020    AST 19 08/16/2020    ALT 16 08/16/2020       ASSESSMENT & PLAN      Diagnosis Plan   1. Permanent atrial fibrillation (HCC)  ECG 12 Lead   2. Essential hypertension  ECG 12 Lead   3. Chronic diastolic CHF (congestive heart failure) (HCC)  ECG 12 Lead   4. Mixed hyperlipidemia  ECG 12 Lead   5. Long term (current) use of anticoagulants  ECG 12 Lead       1.  Persistent atrial fibrillation.  CHADS2-Vasc score 4.  Rate controlled.  Anticoagulated with Eliquis.  2.  Hyperlipidemia.  On rosuvastatin  3.  Hypertension.  Blood pressures controlled.  4.  Hypothyroid on replacement therapy  5.  Heart failure with preserved ejection fraction.  On furosemide.  6.  GERD    No changes to her " medications today.  I am going to get a copy of her labs from Dr. Claire.  She complained of arthritic pain.  I recommended Tylenol.  Follow-up with Vilma in 1 year.    Orders Placed This Encounter   Procedures   • ECG 12 Lead     This order was created via procedure documentation     Order Specific Question:   Release to patient     Answer:   Immediate           MEDICATIONS         Discharge Medications          Accurate as of December 17, 2021 11:40 AM. If you have any questions, ask your nurse or doctor.            Changes to Medications      Instructions Start Date   potassium chloride 10 MEQ CR tablet  What changed: how much to take   30 mEq, Oral, 2 Times Daily         Continue These Medications      Instructions Start Date   atenolol 25 MG tablet  Commonly known as: TENORMIN   25 mg, Oral, 2 Times Daily      cetirizine 10 MG tablet  Commonly known as: zyrTEC   10 mg, Oral, Daily PRN      digoxin 125 MCG tablet  Commonly known as: LANOXIN   125 mcg, Oral, Daily      Eliquis 5 MG tablet tablet  Generic drug: apixaban   TAKE 1 TABLET BY MOUTH EVERY 12 HOURS      furosemide 40 MG tablet  Commonly known as: LASIX   TAKE 1 TABLET BY MOUTH EVERY DAY      levothyroxine 25 MCG tablet  Commonly known as: SYNTHROID, LEVOTHROID   Take one and one half tablets by mouth every morning      omeprazole 20 MG capsule  Commonly known as: priLOSEC   20 mg, Oral, Daily      rosuvastatin 40 MG tablet  Commonly known as: CRESTOR   40 mg, Oral, Daily      Zoloft 50 MG tablet  Generic drug: sertraline   Oral, Daily               Evelyn Moss MD  12/17/21  11:40 EST    **Dragon Disclaimer:   Much of this encounter note is an electronic transcription/translation of spoken language to printed text. The electronic translation of spoken language may permit erroneous, or at times, nonsensical words or phrases to be inadvertently transcribed. Although I have reviewed the note for such errors, some may still exist.

## 2021-12-30 RX ORDER — FUROSEMIDE 40 MG/1
40 TABLET ORAL DAILY
Qty: 90 TABLET | Refills: 3 | Status: SHIPPED | OUTPATIENT
Start: 2021-12-30 | End: 2022-12-27

## 2022-03-14 RX ORDER — APIXABAN 5 MG/1
TABLET, FILM COATED ORAL
Qty: 60 TABLET | Refills: 11 | Status: SHIPPED | OUTPATIENT
Start: 2022-03-14 | End: 2023-04-03

## 2022-07-18 RX ORDER — POTASSIUM CHLORIDE 750 MG/1
20 TABLET, FILM COATED, EXTENDED RELEASE ORAL 2 TIMES DAILY
Qty: 360 TABLET | Refills: 3 | Status: SHIPPED | OUTPATIENT
Start: 2022-07-18

## 2022-08-10 RX ORDER — ATENOLOL 25 MG/1
TABLET ORAL
Qty: 180 TABLET | Refills: 2 | Status: SHIPPED | OUTPATIENT
Start: 2022-08-10

## 2022-09-19 RX ORDER — DIGOXIN 125 MCG
TABLET ORAL
Qty: 90 TABLET | Refills: 3 | Status: SHIPPED | OUTPATIENT
Start: 2022-09-19

## 2022-12-15 ENCOUNTER — TELEPHONE (OUTPATIENT)
Dept: CARDIOLOGY | Facility: CLINIC | Age: 82
End: 2022-12-15

## 2022-12-15 ENCOUNTER — OFFICE VISIT (OUTPATIENT)
Dept: CARDIOLOGY | Facility: CLINIC | Age: 82
End: 2022-12-15

## 2022-12-15 VITALS
HEART RATE: 68 BPM | SYSTOLIC BLOOD PRESSURE: 130 MMHG | HEIGHT: 68 IN | WEIGHT: 162 LBS | DIASTOLIC BLOOD PRESSURE: 70 MMHG | BODY MASS INDEX: 24.55 KG/M2

## 2022-12-15 DIAGNOSIS — I48.21 PERMANENT ATRIAL FIBRILLATION: Primary | ICD-10-CM

## 2022-12-15 DIAGNOSIS — I10 ESSENTIAL HYPERTENSION: ICD-10-CM

## 2022-12-15 DIAGNOSIS — I50.32 CHRONIC DIASTOLIC CHF (CONGESTIVE HEART FAILURE): ICD-10-CM

## 2022-12-15 DIAGNOSIS — E78.2 MIXED HYPERLIPIDEMIA: ICD-10-CM

## 2022-12-15 PROCEDURE — 99214 OFFICE O/P EST MOD 30 MIN: CPT | Performed by: NURSE PRACTITIONER

## 2022-12-15 PROCEDURE — 93000 ELECTROCARDIOGRAM COMPLETE: CPT | Performed by: NURSE PRACTITIONER

## 2022-12-15 NOTE — PROGRESS NOTES
Date of Office Visit: 12/15/22  Encounter Provider: ESTEVAN Zafar  Place of Service: [facility] CARDIOLOGY  Patient Name: Jordana Hdz  :1940    Chief Complaint   Patient presents with   • Permanent atrial fibrillation (CMS/HCC)   • Hypertension   • Hyperlipidemia   • Congestive Heart Failure   • Follow-up   :     HPI: Jordana Hdz is a 82 y.o. female  with hypertension, hyperlipidemia, congestive heart failure, and atrial fibrillation/flutter. She is followed by Dr. Pérez and I will see her in follow up today      She has a history of palpitation and had 1 episode of atrial flutter.  Ablation was discussed but it was declined.   In 2018 she presented and was in atrial fibrillation with a heart rate of 115.  Carvedilol was increased to 12.5 mg twice daily and she was to start Eliquis.  At that time she was taking an antibiotic for salivary gland infection.  At her 1 month follow-up she continued to have symptoms of increased shortness of breath with activity but no palpitations.  Her heart rate was elevated at 112  so we increase carvedilol to 25 mg twice daily.  She was evaluated by Dr. Smalls and discuss rate versus rhythm control strategy.  24-hour Holter completed 10/2/2018 showed average heart rate 92.  She did not want cardioversion so rate control and anticoagulation was continued.     She presented to the emergency department on 2019 with worsening shortness of breath.  proBNP was within normal range.  Chest x-ray showed minimal left pleural effusion and basilar atelectasis.  She received IV Lasix 40 mg x 1 and was started on by mouth Lasix and potassium replacement. Indapamide had been stopped approximately one month prior.follow up BMP was stable and she was continued on furosemide.      Then in 2020, she reported heart rates as high as 130 bpm but blood pressure was low and she was started on digoxin 125 mcg daily.      She then was  "admitted in the hospital after presenting with shortness of breath.  She was found to have left moderate to large pleural effusion.  Troponin, BNP, procalcitonin were normal range.  She was found to be anemic.  Folate, ferritin and vitamin B-12 were normal.  Iron profile was abnormal.  Echocardiogram revealed hyperdynamic left ventricular systolic function with an EF of 71%.  There was mild thickening of the aortic valve but no stenosis or regurgitation noted.  She responded well to diuretic.  Follow-up chest x-ray showed decreased pleural effusion and pulmonary did not feel that thoracentesis was needed.  Also had elevated blood pressure on admission which improved.  She had heme positive stool and was to have an outpatient upper and lower scope.  Outpatient chest x-ray showed resolution of pleural effusion    She presents today for reassessment.  She is now well over this past year.  She denies chest pain or shortness of breath.  Her lightheadedness is better overall.  She has been on iron replacement which was up to twice daily but more recently decreased to once daily because her blood counts improved.  She believes she had a digoxin level drawn with her PCP earlier this year.  She has no falls, near-syncope or syncope or bleeding issues such as blood in the urine or stool.  She reportedly had a negative Hemoccult study.          No Known Allergies        Family and social history reviewed.     ROS  All other systems were reviewed and are negative          Objective:     Vitals:    12/15/22 1305   BP: 130/70   BP Location: Left arm   Patient Position: Sitting   Pulse: 68   Weight: 73.5 kg (162 lb)   Height: 172.7 cm (68\")     Body mass index is 24.63 kg/m².    PHYSICAL EXAM:  Pulmonary:      Effort: Pulmonary effort is normal.      Breath sounds: Normal breath sounds.   Cardiovascular:      Regular rhythm.           ECG 12 Lead    Date/Time: 12/15/2022 1:51 PM  Performed by: Vilma Patterson APRN  Authorized by: " Vilma Patterson APRN   Comparison: compared with previous ECG   Similar to previous ECG  Rhythm: atrial fibrillation  Rate: normal  QRS axis: right                Current Outpatient Medications   Medication Sig Dispense Refill   • atenolol (TENORMIN) 25 MG tablet TAKE 1 TABLET BY MOUTH TWICE A  tablet 2   • cetirizine (zyrTEC) 10 MG tablet Take 10 mg by mouth Daily As Needed.     • digoxin (LANOXIN) 125 MCG tablet TAKE 1 TABLET BY MOUTH EVERY DAY 90 tablet 3   • Eliquis 5 MG tablet tablet TAKE 1 TABLET BY MOUTH EVERY 12 HOURS 60 tablet 11   • Ferrous Sulfate (IRON PO) Take  by mouth Daily.     • furosemide (LASIX) 40 MG tablet Take 1 tablet by mouth Daily. 90 tablet 3   • levothyroxine (SYNTHROID, LEVOTHROID) 25 MCG tablet Take one and one half tablets by mouth every morning     • omeprazole (priLOSEC) 20 MG capsule Take 20 mg by mouth Daily.     • potassium chloride 10 MEQ CR tablet Take 2 tablets by mouth 2 (Two) Times a Day. 360 tablet 3   • rosuvastatin (CRESTOR) 40 MG tablet Take 40 mg by mouth Daily.     • sertraline (ZOLOFT) 50 MG tablet Take  by mouth daily.       No current facility-administered medications for this visit.     Assessment:       Diagnosis Plan   1. Permanent atrial fibrillation (HCC)        2. Essential hypertension        3. Mixed hyperlipidemia        4. Chronic diastolic CHF (congestive heart failure) (HCC)             Orders Placed This Encounter   Procedures   • ECG 12 Lead     This order was created via procedure documentation     Order Specific Question:   Release to patient     Answer:   Routine Release         Plan:     1.  82-year-old female with paroxysmal atrial fibrillation. CHADS2-VASc score is 2 anticoagulated with Eliquis.  Last digoxin level stable in 2021.  We will request her most recent blood work from PCP.  She is rate controlled and is to continue the same at this time  2.Hyperlipidemia on target dose atorvastatin  3. Hypertension blood pressure appears stable    4. Suspected sleep apnea she has no desire to really be tested  5.Hypothyroidism on replacement therapy  6.history of premature ventricular contraction denies palpitations  7.  Heart failure preserved ventricular ejection fraction.  She appears euvolemic on furosemide 40 mg daily.  She checks her weights routinely and knows to call with significant weight gain  8. GERD on therapy  9.  Iron deficient anemia-we will request her most recent blood work from her PCP     Follow-up in 1 year call questions or concerns          It has been a pleasure to participate in this patient's care.      Thank you,  ESTEVAN Zafar      **I used Dragon to dictate this note:**

## 2022-12-21 NOTE — TELEPHONE ENCOUNTER
Rx Refill Note  Requested Prescriptions     Pending Prescriptions Disp Refills    furosemide (LASIX) 40 MG tablet [Pharmacy Med Name: FUROSEMIDE 40 MG TABLET] 90 tablet 3     Sig: TAKE 1 TABLET BY MOUTH EVERY DAY      Last office visit with prescribing clinician: 12/17/2021   Last telemedicine visit with prescribing clinician: Visit date not found   Next office visit with prescribing clinician: 12/27/2023                         Would you like a call back once the refill request has been completed: [] Yes [] No    If the office needs to give you a call back, can they leave a voicemail: [] Yes [] No    Dora Pena MA  12/21/22, 16:27 EST

## 2022-12-23 NOTE — TELEPHONE ENCOUNTER
Rx Refill Note  Requested Prescriptions     Pending Prescriptions Disp Refills    furosemide (LASIX) 40 MG tablet [Pharmacy Med Name: FUROSEMIDE 40 MG TABLET] 90 tablet 3     Sig: TAKE 1 TABLET BY MOUTH EVERY DAY      Last office visit with prescribing clinician: 12/17/2021   Last telemedicine visit with prescribing clinician: Visit date not found   Next office visit with prescribing clinician: 12/27/2023                         Would you like a call back once the refill request has been completed: [] Yes [] No    If the office needs to give you a call back, can they leave a voicemail: [] Yes [] No    Dora Pena MA  12/23/22, 11:51 EST

## 2022-12-27 ENCOUNTER — TELEPHONE (OUTPATIENT)
Dept: CARDIOLOGY | Facility: CLINIC | Age: 82
End: 2022-12-27

## 2022-12-27 RX ORDER — FUROSEMIDE 40 MG/1
TABLET ORAL
Qty: 90 TABLET | Refills: 3 | Status: SHIPPED | OUTPATIENT
Start: 2022-12-27

## 2022-12-27 NOTE — TELEPHONE ENCOUNTER
Caller: Jordana Hdz    Relationship to patient: Self    Best call back number: [phone]    Patient is needing: PATIENT CALLED IN THAT SHE HAS BEEN TRYING TO GET HER LASIX FILLED AT [facility] FOR OVER A WEEK AND SHE TOOK HER LAST PILL TODAY. LOOKS LIKE REFILL WAS SENT TO [facility] ON 12.22.23 BUT PHARMACY REQUESTED PATIENT TO REACH OUT TO THE OFFICE.

## 2023-04-03 RX ORDER — APIXABAN 5 MG/1
TABLET, FILM COATED ORAL
Qty: 180 TABLET | Refills: 3 | Status: SHIPPED | OUTPATIENT
Start: 2023-04-03

## 2023-05-17 RX ORDER — ATENOLOL 25 MG/1
TABLET ORAL
Qty: 180 TABLET | Refills: 2 | Status: SHIPPED | OUTPATIENT
Start: 2023-05-17

## 2023-09-13 RX ORDER — DIGOXIN 125 MCG
TABLET ORAL
Qty: 90 TABLET | Refills: 3 | Status: SHIPPED | OUTPATIENT
Start: 2023-09-13

## 2023-10-18 RX ORDER — FUROSEMIDE 40 MG/1
40 TABLET ORAL DAILY
Qty: 90 TABLET | Refills: 2 | Status: SHIPPED | OUTPATIENT
Start: 2023-10-18

## 2023-10-18 RX ORDER — POTASSIUM CHLORIDE 750 MG/1
20 TABLET, FILM COATED, EXTENDED RELEASE ORAL 2 TIMES DAILY
Qty: 180 TABLET | Refills: 2 | Status: SHIPPED | OUTPATIENT
Start: 2023-10-18

## 2023-10-19 ENCOUNTER — TELEPHONE (OUTPATIENT)
Dept: CARDIOLOGY | Facility: CLINIC | Age: 83
End: 2023-10-19

## 2023-10-19 NOTE — TELEPHONE ENCOUNTER
JUST UPDATING PHARMACY NO REFILLS NEEDED AT THIS TIME.       [facility]

## 2023-12-04 RX ORDER — FUROSEMIDE 40 MG/1
40 TABLET ORAL DAILY
Qty: 90 TABLET | Refills: 2 | Status: SHIPPED | OUTPATIENT
Start: 2023-12-04

## 2023-12-04 RX ORDER — ATENOLOL 25 MG/1
25 TABLET ORAL 2 TIMES DAILY
Qty: 180 TABLET | Refills: 2 | Status: SHIPPED | OUTPATIENT
Start: 2023-12-04

## 2023-12-04 NOTE — TELEPHONE ENCOUNTER
Caller: Jordana Hdz    Relationship: Self        Requested Prescriptions:   Requested Prescriptions     Pending Prescriptions Disp Refills    atenolol (TENORMIN) 25 MG tablet 180 tablet 2     Sig: Take 1 tablet by mouth 2 (Two) Times a Day.    furosemide (LASIX) 40 MG tablet 90 tablet 2     Sig: Take 1 tablet by mouth Daily.        Pharmacy where request should be sent: [facility] - [place], [place] - [address] [facility] - [phone]  - [phone]      Last office visit with prescribing clinician: 12/17/2021   Last telemedicine visit with prescribing clinician: Visit date not found   Next office visit with prescribing clinician: 12/27/2023     Additional details provided by patient:     Does the patient have less than a 3 day supply:  [] Yes  [x] No    Would you like a call back once the refill request has been completed: [] Yes [] No    If the office needs to give you a call back, can they leave a voicemail: [] Yes [] No    Nilson Aldana Rep   12/04/23 11:48 EST

## 2023-12-12 ENCOUNTER — TRANSCRIBE ORDERS (OUTPATIENT)
Dept: ADMINISTRATIVE | Facility: HOSPITAL | Age: 83
End: 2023-12-12
Payer: MEDICARE

## 2023-12-12 DIAGNOSIS — K11.22 ACUTE RECURRENT SIALOADENITIS: Primary | ICD-10-CM

## 2023-12-18 ENCOUNTER — HOSPITAL ENCOUNTER (OUTPATIENT)
Dept: ULTRASOUND IMAGING | Facility: HOSPITAL | Age: 83
Discharge: HOME | End: 2023-12-18
Admitting: OTOLARYNGOLOGY
Payer: MEDICARE

## 2023-12-18 DIAGNOSIS — K11.22 ACUTE RECURRENT SIALOADENITIS: ICD-10-CM

## 2023-12-18 PROCEDURE — 76536 US EXAM OF HEAD AND NECK: CPT

## 2023-12-27 ENCOUNTER — OFFICE VISIT (OUTPATIENT)
Dept: CARDIOLOGY | Facility: CLINIC | Age: 83
End: 2023-12-27
Payer: MEDICARE

## 2023-12-27 VITALS
WEIGHT: 165.6 LBS | DIASTOLIC BLOOD PRESSURE: 74 MMHG | HEART RATE: 63 BPM | OXYGEN SATURATION: 96 % | HEIGHT: 68 IN | SYSTOLIC BLOOD PRESSURE: 124 MMHG | BODY MASS INDEX: 25.1 KG/M2

## 2023-12-27 DIAGNOSIS — I50.32 CHRONIC DIASTOLIC CHF (CONGESTIVE HEART FAILURE): Primary | ICD-10-CM

## 2023-12-27 DIAGNOSIS — I48.21 PERMANENT ATRIAL FIBRILLATION: ICD-10-CM

## 2023-12-27 DIAGNOSIS — E78.5 HYPERLIPIDEMIA, UNSPECIFIED HYPERLIPIDEMIA TYPE: ICD-10-CM

## 2023-12-27 DIAGNOSIS — I10 PRIMARY HYPERTENSION: ICD-10-CM

## 2023-12-27 NOTE — PROGRESS NOTES
"      CARDIOLOGY    Evelyn Moss MD    ENCOUNTER DATE:  12/27/2023    Jordana Hdz / 83 y.o. / female        CHIEF COMPLAINT / REASON FOR OFFICE VISIT     Follow-up      HISTORY OF PRESENT ILLNESS       HPI    Jordana Hdz is a 83 y.o. female     This is a patient who previously followed with Dr. Pérez.  She has hypertension, hyperlipidemia and paroxysmal atrial fibrillation.  This is generally been managed medically.  Procedure such as ablation and cardioversion have been discussed in the past but she has always declined.  Her most recent echo was in August 2020 showing an ejection fraction of 71%, trace mitral regurgitation, normal right ventricular systolic pressure.  48-hour Holter monitor May 2021 which showed that she was in atrial fibrillation with an average heart rate of 75 bpm.     She is doing well.  She feels an occasional palpitation when she is stressed.  She is not doing much in the way of exercise because of arthritis.  Her blood pressure has been well-controlled.      REVIEW OF SYSTEMS     Review of Systems   Constitutional: Negative for chills, fever, weight gain and weight loss.   Cardiovascular:  Negative for leg swelling.   Respiratory:  Negative for cough, snoring and wheezing.    Hematologic/Lymphatic: Negative for bleeding problem. Does not bruise/bleed easily.   Skin:  Negative for color change.   Musculoskeletal:  Negative for falls, joint pain and myalgias.   Gastrointestinal:  Negative for melena.   Genitourinary:  Negative for hematuria.   Neurological:  Negative for excessive daytime sleepiness.   Psychiatric/Behavioral:  Negative for depression. The patient is not nervous/anxious.          VITAL SIGNS     Visit Vitals  /74   Pulse 63   Ht 172.7 cm (68\")   Wt 75.1 kg (165 lb 9.6 oz)   SpO2 96%   BMI 25.18 kg/m²         Wt Readings from Last 3 Encounters:   12/27/23 75.1 kg (165 lb 9.6 oz)   12/15/22 73.5 kg (162 lb)   12/17/21 76.7 kg (169 lb)     Body mass " index is 25.18 kg/m².      PHYSICAL EXAMINATION     Constitutional:       General: Not in acute distress.  Neck:      Vascular: No carotid bruit or JVD.   Pulmonary:      Effort: Pulmonary effort is normal.      Breath sounds: Normal breath sounds.   Cardiovascular:      Normal rate. Irregularly irregular rhythm.      Murmurs: There is no murmur.   Psychiatric:         Mood and Affect: Mood and affect normal.           REVIEWED DATA       ECG 12 Lead    Date/Time: 12/27/2023 1:13 PM  Performed by: Evelyn Moss MD    Authorized by: Evelyn Moss MD  Comparison: compared with previous ECG from 12/15/2022  Rhythm: atrial fibrillation  BPM: 63  ST Segments: ST segments normal  T Waves: T waves normal    Clinical impression: abnormal EKG                Lab Results   Component Value Date    GLUCOSE 121 (H) 09/04/2020    BUN 17 09/04/2020    CREATININE 0.88 09/04/2020    EGFR >60 03/21/2014    BCR 19.3 09/04/2020    K 4.5 09/04/2020    CO2 25.8 09/04/2020    CALCIUM 9.0 09/04/2020    ALBUMIN 3.40 (L) 08/16/2020    BILITOT 0.6 08/16/2020    AST 19 08/16/2020    ALT 16 08/16/2020       ASSESSMENT & PLAN      Diagnosis Plan   1. Chronic diastolic CHF (congestive heart failure)        2. Hyperlipidemia, unspecified hyperlipidemia type        3. Permanent atrial fibrillation        4. Primary hypertension              1.  Persistent atrial fibrillation.  CHADS2-Vasc score 4.  Rate controlled.  Continue with atenolol and Eliquis.  2.  Hyperlipidemia.  On rosuvastatin  3.  Hypertension.  Blood pressure well-controlled.  4.  Hypothyroid on replacement therapy  5.  Heart failure with preserved ejection fraction.  Euvolemic.  Continue with current medications.  6.  GERD     Follow-up in 1 year unless she has a change in her symptoms sooner.    Orders Placed This Encounter   Procedures    ECG 12 Lead     This order was created via procedure documentation     Order Specific Question:   Release to patient     Answer:   Routine  Release [[id number]]           MEDICATIONS         Discharge Medications            Accurate as of December 27, 2023  1:14 PM. If you have any questions, ask your nurse or doctor.                Continue These Medications        Instructions Start Date   atenolol 25 MG tablet  Commonly known as: TENORMIN   25 mg, Oral, 2 Times Daily      cetirizine 10 MG tablet  Commonly known as: zyrTEC   10 mg, Oral, Daily PRN      digoxin 125 MCG tablet  Commonly known as: LANOXIN   TAKE 1 TABLET BY MOUTH EVERY DAY      Eliquis 5 MG tablet tablet  Generic drug: apixaban   TAKE 1 TABLET BY MOUTH EVERY 12 HOURS      furosemide 40 MG tablet  Commonly known as: LASIX   40 mg, Oral, Daily      IRON PO   Oral, Daily      levothyroxine 25 MCG tablet  Commonly known as: SYNTHROID, LEVOTHROID   Take one and one half tablets by mouth every morning      omeprazole 20 MG capsule  Commonly known as: priLOSEC   20 mg, Oral, Daily      potassium chloride 10 MEQ CR tablet   20 mEq, Oral, 2 Times Daily      rosuvastatin 40 MG tablet  Commonly known as: CRESTOR   40 mg, Oral, Daily      sertraline 50 MG tablet  Commonly known as: ZOLOFT   Oral, Daily                 Evelyn Moss MD  12/27/23  13:14 EST    Part of this note may be an electronic transcription/translation of spoken language to printed text using the Dragon dictation system.

## 2024-03-26 RX ORDER — APIXABAN 5 MG/1
5 TABLET, FILM COATED ORAL EVERY 12 HOURS
Qty: 180 TABLET | Refills: 3 | Status: SHIPPED | OUTPATIENT
Start: 2024-03-26

## 2024-04-19 RX ORDER — FUROSEMIDE 40 MG/1
40 TABLET ORAL DAILY
Qty: 90 TABLET | Refills: 1 | Status: SHIPPED | OUTPATIENT
Start: 2024-04-19

## 2024-04-19 RX ORDER — POTASSIUM CHLORIDE 750 MG/1
20 TABLET, FILM COATED, EXTENDED RELEASE ORAL 2 TIMES DAILY
Qty: 540 TABLET | Refills: 1 | Status: SHIPPED | OUTPATIENT
Start: 2024-04-19

## 2024-06-17 RX ORDER — ATENOLOL 25 MG/1
25 TABLET ORAL 2 TIMES DAILY
Qty: 180 TABLET | Refills: 1 | Status: SHIPPED | OUTPATIENT
Start: 2024-06-17

## 2024-07-11 RX ORDER — DIGOXIN 125 MCG
125 TABLET ORAL DAILY
Qty: 90 TABLET | Refills: 2 | Status: SHIPPED | OUTPATIENT
Start: 2024-07-11

## 2024-09-12 ENCOUNTER — TRANSCRIBE ORDERS (OUTPATIENT)
Dept: ADMINISTRATIVE | Facility: HOSPITAL | Age: 84
End: 2024-09-12
Payer: MEDICARE

## 2024-09-12 DIAGNOSIS — K11.22 ACUTE RECURRENT SIALOADENITIS: Primary | ICD-10-CM

## 2024-09-24 ENCOUNTER — HOSPITAL ENCOUNTER (OUTPATIENT)
Dept: CT IMAGING | Facility: HOSPITAL | Age: 84
Discharge: HOME | End: 2024-09-24
Admitting: OTOLARYNGOLOGY
Payer: MEDICARE

## 2024-09-24 DIAGNOSIS — K11.22 ACUTE RECURRENT SIALOADENITIS: ICD-10-CM

## 2024-09-24 PROCEDURE — 70492 CT SFT TSUE NCK W/O & W/DYE: CPT

## 2024-09-24 PROCEDURE — 25510000001 IOPAMIDOL 61 % SOLUTION: Performed by: OTOLARYNGOLOGY

## 2024-09-24 RX ORDER — IOPAMIDOL 612 MG/ML
100 INJECTION, SOLUTION INTRAVASCULAR
Status: COMPLETED | OUTPATIENT
Start: 2024-09-24 | End: 2024-09-24

## 2024-09-24 RX ADMIN — IOPAMIDOL 75 ML: 612 INJECTION, SOLUTION INTRAVENOUS at 15:36

## 2024-12-30 ENCOUNTER — OFFICE VISIT (OUTPATIENT)
Dept: CARDIOLOGY | Facility: CLINIC | Age: 84
End: 2024-12-30
Payer: MEDICARE

## 2024-12-30 VITALS
WEIGHT: 166.6 LBS | DIASTOLIC BLOOD PRESSURE: 70 MMHG | OXYGEN SATURATION: 96 % | SYSTOLIC BLOOD PRESSURE: 130 MMHG | HEART RATE: 61 BPM | BODY MASS INDEX: 25.25 KG/M2 | HEIGHT: 68 IN

## 2024-12-30 DIAGNOSIS — I48.21 PERMANENT ATRIAL FIBRILLATION: Primary | ICD-10-CM

## 2024-12-30 DIAGNOSIS — I50.32 CHRONIC DIASTOLIC CHF (CONGESTIVE HEART FAILURE): ICD-10-CM

## 2024-12-30 DIAGNOSIS — I10 PRIMARY HYPERTENSION: ICD-10-CM

## 2024-12-30 DIAGNOSIS — E78.5 HYPERLIPIDEMIA, UNSPECIFIED HYPERLIPIDEMIA TYPE: ICD-10-CM

## 2024-12-30 RX ORDER — EZETIMIBE 10 MG/1
1 TABLET ORAL DAILY
COMMUNITY
Start: 2024-12-16

## 2024-12-30 NOTE — PROGRESS NOTES
Date of Office Visit: 24  Encounter Provider: ESTEVAN Zafar  Place of Service: [facility] CARDIOLOGY  Patient Name: Jordana Hdz  :1940    Chief Complaint   Patient presents with    Palpitations    Follow-up    Atrial Fibrillation   :     HPI: Jordana Hdz is a 84 y.o. female  with hypertension, hyperlipidemia, congestive heart failure, iron deficient anemia, and atrial fibrillation/flutter.     She was previously followed by Dr. Guicho Pérez.  She is now followed by Dr. Evelyn Moss.  I will visit with her in follow-up today and have reviewed her medical record.     She has a history of palpitation and had 1 episode of atrial flutter.  Ablation was discussed but it was declined.   In 2018 she presented and was in atrial fibrillation with a heart rate of 115.  Carvedilol was increased to 12.5 mg twice daily and she was to start Eliquis.  At that time she was taking an antibiotic for salivary gland infection.  At her 1 month follow-up she continued to have symptoms of increased shortness of breath with activity but no palpitations.  Her heart rate was elevated at 112  so we increase carvedilol to 25 mg twice daily.  She was evaluated by Dr. Smalls and discuss rate versus rhythm control strategy.  24-hour Holter completed 10/2/2018 showed average heart rate 92.  She did not want cardioversion so rate control and anticoagulation was continued.     She presented to the emergency department on 2019 with worsening shortness of breath.  proBNP was within normal range.  Chest x-ray showed minimal left pleural effusion and basilar atelectasis.  She received IV Lasix 40 mg x 1 and was started on by mouth Lasix and potassium replacement. Indapamide had been stopped approximately one month prior.follow up BMP was stable and she was continued on furosemide.      Then in 2020, she reported heart rates as high as 130 bpm but blood pressure was low and  "she was started on digoxin 125 mcg daily.      She then was admitted in the hospital after presenting with shortness of breath.  She was found to have left moderate to large pleural effusion.  Troponin, BNP, procalcitonin were normal range.  She was found to be anemic.  Folate, ferritin and vitamin B-12 were normal.  Iron profile was abnormal.  Echocardiogram revealed hyperdynamic left ventricular systolic function with an EF of 71%.  There was mild thickening of the aortic valve but no stenosis or regurgitation noted.  She responded well to diuretic.  Follow-up chest x-ray showed decreased pleural effusion and pulmonary did not feel that thoracentesis was needed.  Also had elevated blood pressure on admission which improved.  She had heme positive stool and was to have an outpatient upper and lower scope.  Outpatient chest x-ray showed resolution of pleural effusion    She presents today for reassessment.  She has no chest pain or shortness of breath or edema or dizziness.  No near-syncope or syncope.  She has rare palpitations which typically occur if she is anxious or stressed.  She describes this as a racing sensation for 15-20 minutes and then resolved spontaneously.  No blood in the urine or stool.  Reportedly is taking ezetimibe and is scheduled for repeat labs with her PCP in March.          No Known Allergies        Family and social history reviewed.     ROS  All other systems were reviewed and are negative          Objective:     Vitals:    12/30/24 1338   BP: 130/70   BP Location: Left arm   Patient Position: Sitting   Cuff Size: Adult   Pulse: 61   SpO2: 96%   Weight: 75.6 kg (166 lb 9.6 oz)   Height: 172.7 cm (68\")     Body mass index is 25.33 kg/m².    PHYSICAL EXAM:  Pulmonary:      Effort: Pulmonary effort is normal.      Breath sounds: Normal breath sounds.   Cardiovascular:      Normal rate. Irregularly irregular rhythm.           ECG 12 Lead    Date/Time: 12/30/2024 2:01 PM  Performed by: Yesenia" ESTEVAN Esparza    Authorized by: Vilma Patterson APRN  Comparison: compared with previous ECG   Similar to previous ECG  Rhythm: atrial fibrillation  Rate: normal  Q waves: V1 and V2    QRS axis: normal    Clinical impression: abnormal EKG            Current Outpatient Medications   Medication Sig Dispense Refill    apixaban (Eliquis) 5 MG tablet tablet Take 1 tablet by mouth Every 12 (Twelve) Hours. 180 tablet 3    atenolol (TENORMIN) 25 MG tablet TAKE ONE TABLET BY MOUTH TWICE DAILY 180 tablet 1    cetirizine (zyrTEC) 10 MG tablet Take 1 tablet by mouth Daily As Needed.      digoxin (LANOXIN) 125 MCG tablet TAKE ONE TABLET BY MOUTH EVERY DAY 90 tablet 2    ezetimibe (ZETIA) 10 MG tablet Take 1 tablet by mouth Daily.      Ferrous Sulfate (IRON PO) Take  by mouth Daily.      furosemide (LASIX) 40 MG tablet TAKE ONE TABLET BY MOUTH DAILY 90 tablet 1    levothyroxine (SYNTHROID, LEVOTHROID) 25 MCG tablet Take one and one half tablets by mouth every morning      omeprazole (priLOSEC) 20 MG capsule Take 1 capsule by mouth Daily.      potassium chloride 10 MEQ CR tablet TAKE TWO TABLETS BY MOUTH TWICE DAILY 540 tablet 1    rosuvastatin (CRESTOR) 40 MG tablet Take 1 tablet by mouth Daily.      sertraline (ZOLOFT) 50 MG tablet Take  by mouth daily.       No current facility-administered medications for this visit.     Assessment:       Diagnosis Plan   1. Permanent atrial fibrillation        2. Primary hypertension        3. Hyperlipidemia, unspecified hyperlipidemia type        4. Chronic diastolic CHF (congestive heart failure)             Orders Placed This Encounter   Procedures    ECG 12 Lead     This order was created via procedure documentation     Order Specific Question:   Release to patient     Answer:   Routine Release [[id number]]         Plan:        1.  84-year-old female with paroxysmal atrial fibrillation. CHADS2-VASc score is 6 anticoagulated with Eliquis 5 mg twice daily.  She has stable but rare  palpitations with mental stress.  Overall, she is stable.   - I do not have a recent digoxin level and patient states that she will request this from her PCP in March with her repeat blood work.  2.Hyperlipidemia on rosuvastatin 40 mg with ezetimibe 10 mg daily.  3. Hypertension blood pressure appears stable   4. Suspected sleep apnea she has no desire to really be tested  5.Hypothyroidism on replacement therapy  6.history of premature ventricular contraction denies palpitations  7.  Heart failure preserved ventricular ejection fraction.  She appears euvolemic on furosemide 40 mg daily.  She checks her weights routinely and knows to call with significant weight gain  8. GERD on therapy  9.  Iron deficient anemia-remains on iron replacement.  I will set a reminder to request her repeat blood work after updating her labs with her PCP in March.    Follow-up in 1 year in cardiology clinic.  Call sooner with any questions or concerns.          It has been a pleasure to participate in this patient's care.      Thank you,  ESTEVAN Zafar      **I used Dragon to dictate this note:**

## 2025-01-08 RX ORDER — DIGOXIN 125 MCG
125 TABLET ORAL DAILY
Qty: 90 TABLET | Refills: 2 | Status: SHIPPED | OUTPATIENT
Start: 2025-01-08

## 2025-01-08 RX ORDER — ATENOLOL 25 MG/1
25 TABLET ORAL 2 TIMES DAILY
Qty: 180 TABLET | Refills: 0 | Status: SHIPPED | OUTPATIENT
Start: 2025-01-08

## 2025-01-14 RX ORDER — FUROSEMIDE 40 MG/1
40 TABLET ORAL DAILY
Qty: 90 TABLET | Refills: 1 | OUTPATIENT
Start: 2025-01-14

## 2025-01-14 RX ORDER — FUROSEMIDE 40 MG/1
40 TABLET ORAL DAILY
Qty: 90 TABLET | Refills: 3 | Status: SHIPPED | OUTPATIENT
Start: 2025-01-14

## 2025-04-01 ENCOUNTER — PATIENT MESSAGE (OUTPATIENT)
Dept: CARDIOLOGY | Age: 85
End: 2025-04-01
Payer: MEDICARE

## 2025-04-01 DIAGNOSIS — E78.5 HYPERLIPIDEMIA, UNSPECIFIED HYPERLIPIDEMIA TYPE: ICD-10-CM

## 2025-04-01 DIAGNOSIS — Z51.81 ENCOUNTER FOR MONITORING DIGOXIN THERAPY: ICD-10-CM

## 2025-04-01 DIAGNOSIS — I48.21 PERMANENT ATRIAL FIBRILLATION: Primary | ICD-10-CM

## 2025-04-01 DIAGNOSIS — Z79.899 ENCOUNTER FOR MONITORING DIGOXIN THERAPY: ICD-10-CM

## 2025-04-01 DIAGNOSIS — Z51.81 ENCOUNTER FOR THERAPEUTIC DRUG MONITORING: ICD-10-CM

## 2025-04-21 ENCOUNTER — TELEPHONE (OUTPATIENT)
Dept: CARDIOLOGY | Age: 85
End: 2025-04-21
Payer: MEDICARE

## 2025-04-21 NOTE — TELEPHONE ENCOUNTER
Caller: Jordana Hdz    Relationship: Self    Best call back number: [phone]    Requested Prescriptions:   Requested Prescriptions     Pending Prescriptions Disp Refills    apixaban (Eliquis) 5 MG tablet tablet 180 tablet 3     Sig: Take 1 tablet by mouth Every 12 (Twelve) Hours.        Pharmacy where request should be sent:      [facility] - [place], [place] - [address] [address] - [phone]  - [phone] [facility] [facility] [address]   Phone: [phone] Fax: [phone]       Last office visit with prescribing clinician: 12/27/2023   Last telemedicine visit with prescribing clinician: Visit date not found   Next office visit with prescribing clinician: 12/31/2025     Additional details provided by patient: PT TOOK LAST PILL THIS MORNING    Does the patient have less than a 3 day supply:  [x] Yes  [] No    Would you like a call back once the refill request has been completed: [] Yes [x] No    If the office needs to give you a call back, can they leave a voicemail: [] Yes [x] No    Nilson Jay Rep   04/21/25 16:15 EDT

## 2025-05-20 RX ORDER — POTASSIUM CHLORIDE 750 MG/1
20 TABLET, EXTENDED RELEASE ORAL 2 TIMES DAILY
Qty: 120 TABLET | Refills: 2 | Status: SHIPPED | OUTPATIENT
Start: 2025-05-20

## 2025-06-26 RX ORDER — ATENOLOL 25 MG/1
25 TABLET ORAL 2 TIMES DAILY
Qty: 180 TABLET | Refills: 0 | Status: SHIPPED | OUTPATIENT
Start: 2025-06-26

## 2025-07-08 ENCOUNTER — OFFICE VISIT (OUTPATIENT)
Dept: ORTHOPEDIC SURGERY | Facility: CLINIC | Age: 85
End: 2025-07-08
Payer: MEDICARE

## 2025-07-08 VITALS — BODY MASS INDEX: 25.25 KG/M2 | HEIGHT: 68 IN | TEMPERATURE: 98.2 F | WEIGHT: 166.6 LBS

## 2025-07-08 DIAGNOSIS — R52 PAIN: Primary | ICD-10-CM

## 2025-07-08 PROCEDURE — 99204 OFFICE O/P NEW MOD 45 MIN: CPT | Performed by: ORTHOPAEDIC SURGERY

## 2025-07-08 NOTE — PROGRESS NOTES
Patient: Jordana Hdz  YOB: 1940 84 y.o. female  Medical Record Number: [id number]    Chief Complaints:   Chief Complaint   Patient presents with    Right Hip - Pain, Initial Evaluation       History of Present Illness:Jordana Hdz is a 84 y.o. female who presents with  right hip pain  -  limiits adls and walking tolerance -  uses a cane. Has worsened over the last years.    Allergies: No Known Allergies    Medications:   Current Outpatient Medications   Medication Sig Dispense Refill    apixaban (Eliquis) 5 MG tablet tablet Take 1 tablet by mouth Every 12 (Twelve) Hours. 180 tablet 3    atenolol (TENORMIN) 25 MG tablet TAKE ONE TABLET BY MOUTH TWICE DAILY 180 tablet 0    cetirizine (zyrTEC) 10 MG tablet Take 1 tablet by mouth Daily As Needed.      digoxin (LANOXIN) 125 MCG tablet TAKE ONE TABLET BY MOUTH EVERY DAY 90 tablet 2    ezetimibe (ZETIA) 10 MG tablet Take 1 tablet by mouth Daily.      furosemide (LASIX) 40 MG tablet Take 1 tablet by mouth Daily. 90 tablet 3    levothyroxine (SYNTHROID, LEVOTHROID) 25 MCG tablet Take one and one half tablets by mouth every morning      omeprazole (priLOSEC) 20 MG capsule Take 1 capsule by mouth Daily.      potassium chloride 10 MEQ CR tablet TAKE TWO TABLETS BY MOUTH TWICE DAILY 120 tablet 2    rosuvastatin (CRESTOR) 40 MG tablet Take 1 tablet by mouth Daily.      sertraline (ZOLOFT) 50 MG tablet Take  by mouth daily.      Ferrous Sulfate (IRON PO) Take  by mouth Daily. (Patient not taking: Reported on 7/8/2025)       No current facility-administered medications for this visit.         The following portions of the patient's history were reviewed and updated as appropriate: allergies, current medications, past family history, past medical history, past social history, past surgical history and problem list.    Review of Systems:   Pertinent positives/negative listed in HPI above    Physical Exam:   Vitals:    07/08/25 1249   Temp: 98.2 °F (36.8  "°C)   Weight: 75.6 kg (166 lb 9.6 oz)   Height: 171.5 cm (67.5\")   PainSc: 8    PainLoc: Hip       General: A and O x 3, ASA, NAD      Hip:  right    LEG ALIGNMENT:     Neutral        LEG LENGTH DISCREPANCY   :    left longer by 0.5 cm    GAIT:     Antalgic    SKIN:     No abnormality    RANGE OF MOTION:     Limited by joint irritability    STRENGTH:     Limited by joint irratibility    DISTAL PULSES:    Paplable    DISTAL SENSATION :   Intact    LYMPHATICS:     No   lymphadenopathy    OTHER:          +   Stinchfeld test      -    log roll      -   Tenderness to palpation trochanteric bursa         Radiology:  Xrays 2views right hip  (AP bilateral hips, and lateral of the hip) ordered and reviewed for evaluation of hip pain  demonstrating  advanced, end-satge osteoarthritis with bone on bone articluation, periarticular osteophytes, and subchondral cysts    Assessment/Plan: right hip endstage OA  Continuation of conservative management vs. KONSTANTIN discussed.  The patient wishes to proceed with total hip replacement.  At this point the patient has failed the full gamut of conservative treatment and stating complete understanding of the risks/benefits/ anternatives wishes to proceed with surgical treatment.    Risk and benefits of surgery were reviewed.  Including, but not limited to, blood clots, anesthesia risk, infection, leg length discrepancy, fracture, skin/leg numbness, failure of the implant, need for future surgeries, continued pain, hematoma, need for transfusion, and death, among others.  The patient understands and wishes to proceed.     The spectrum of treatment options were discussed with the patient in detail including both the nonoperative and operative treatment modalities and their respective risks and benefits.  After thorough discussion, the patient has elected to undergo surgical treatment.  The details of the surgical procedure were explained including the location of probable incisions and a " description of the likely implants to be used.  Models and diagrams were used as educational resources. The patient understands the likely convalescence after surgery, as well as the rehabilitation required.  We thoroughly discussed the risks, benefits, and alternatives to surgery.  The risks include but are not limited to the risk of infection, joint stiffness, blood clots (including DVT and/or pulmonary embolus along with the risk of death), neurologic and/or vascular injury, fracture, dislocation, nonunion, malunion, need for further surgery including hardware failure requiring revision, and continued pain.  It was explained that if tissue has been repaired or reconstructed, there is also a chance of failure which may require further management.  Following the completion of the discussion, the patient expressed understanding of this planned course of care, all their questions were answered and consent will be obtained preoperatively.    Operative Plan: Anterior approach Total Hip Replacement - Outpatient  -  will call when ready to proceed - needs cardiac clearance  -  afib on eilquis    Diagnoses and all orders for this visit:    1. Pain (Primary)  -     XR Hip With or Without Pelvis 2 - 3 View Right        Fermin Oneill MD  7/8/2025

## 2025-07-14 ENCOUNTER — TELEPHONE (OUTPATIENT)
Dept: CARDIOLOGY | Age: 85
End: 2025-07-14
Payer: MEDICARE

## 2025-07-14 DIAGNOSIS — I48.21 PERMANENT ATRIAL FIBRILLATION: Primary | ICD-10-CM

## 2025-07-14 DIAGNOSIS — Z01.810 PREOP CARDIOVASCULAR EXAM: ICD-10-CM

## 2025-07-14 NOTE — TELEPHONE ENCOUNTER
If she does not routinely exercise then I recommend that she have an up-to-date nuclear stress test prior to finalizing clearance for hip replacement.  If she is not routinely exercising, I will place the order for nuclear stress test

## 2025-07-14 NOTE — TELEPHONE ENCOUNTER
Pt called in requesting cardiac clearance on hip replacement sx she wants to schedule with Dr. Fermin Oneill.        Do you have any recommendations for this patient?    Candi KAUFMAN RN  [facility]  07/14/25  13:56 EDT

## 2025-07-14 NOTE — TELEPHONE ENCOUNTER
I s/w pt and gave her your message.  She says she does not routinely exercise due to her hip.  She's agreeable to ST. Candi KAUFMAN RN  Triage [facility] [place]  07/14/25 15:03 EDT

## 2025-07-18 ENCOUNTER — TELEPHONE (OUTPATIENT)
Dept: CARDIOLOGY | Age: 85
End: 2025-07-18
Payer: MEDICARE

## 2025-07-21 ENCOUNTER — HOSPITAL ENCOUNTER (OUTPATIENT)
Dept: CARDIOLOGY | Facility: HOSPITAL | Age: 85
Discharge: HOME | End: 2025-07-21
Admitting: NURSE PRACTITIONER
Payer: MEDICARE

## 2025-07-21 ENCOUNTER — RESULTS FOLLOW-UP (OUTPATIENT)
Dept: CARDIOLOGY | Age: 85
End: 2025-07-21
Payer: MEDICARE

## 2025-07-21 VITALS — WEIGHT: 166.67 LBS | BODY MASS INDEX: 25.26 KG/M2 | HEIGHT: 68 IN

## 2025-07-21 DIAGNOSIS — I48.21 PERMANENT ATRIAL FIBRILLATION: ICD-10-CM

## 2025-07-21 DIAGNOSIS — Z01.810 PREOP CARDIOVASCULAR EXAM: ICD-10-CM

## 2025-07-21 LAB
BH CV NUCLEAR PRIOR STUDY: 2
BH CV REST NUCLEAR ISOTOPE DOSE: 10.2 MCI
BH CV STRESS BP STAGE 1: NORMAL
BH CV STRESS COMMENTS STAGE 1: NORMAL
BH CV STRESS DOSE REGADENOSON STAGE 1: 0.4
BH CV STRESS DURATION MIN STAGE 1: 0
BH CV STRESS DURATION SEC STAGE 1: 10
BH CV STRESS HR STAGE 1: 75
BH CV STRESS NUCLEAR ISOTOPE DOSE: 34.4 MCI
BH CV STRESS PROTOCOL 1: NORMAL
BH CV STRESS RECOVERY BP: NORMAL MMHG
BH CV STRESS RECOVERY HR: 66 BPM
BH CV STRESS STAGE 1: 1
MAXIMAL PREDICTED HEART RATE: 137 BPM
PERCENT MAX PREDICTED HR: 54.74 %
SPECT HRT GATED+EF W RNC IV: 63 %
STRESS BASELINE BP: NORMAL MMHG
STRESS BASELINE HR: 54 BPM
STRESS PERCENT HR: 64 %
STRESS POST EXERCISE DUR SEC: 10 SEC
STRESS POST PEAK BP: NORMAL MMHG
STRESS POST PEAK HR: 75 BPM
STRESS TARGET HR: 116 BPM

## 2025-07-21 PROCEDURE — A9502 TC99M TETROFOSMIN: HCPCS | Performed by: NURSE PRACTITIONER

## 2025-07-21 PROCEDURE — 25010000002 REGADENOSON 0.4 MG/5ML SOLUTION: Performed by: NURSE PRACTITIONER

## 2025-07-21 PROCEDURE — 78452 HT MUSCLE IMAGE SPECT MULT: CPT

## 2025-07-21 PROCEDURE — 78452 HT MUSCLE IMAGE SPECT MULT: CPT | Performed by: INTERNAL MEDICINE

## 2025-07-21 PROCEDURE — 34310000005 TECHNETIUM TETROFOSMIN KIT: Performed by: NURSE PRACTITIONER

## 2025-07-21 PROCEDURE — 93018 CV STRESS TEST I&R ONLY: CPT | Performed by: INTERNAL MEDICINE

## 2025-07-21 PROCEDURE — 93016 CV STRESS TEST SUPVJ ONLY: CPT | Performed by: INTERNAL MEDICINE

## 2025-07-21 PROCEDURE — 93017 CV STRESS TEST TRACING ONLY: CPT

## 2025-07-21 RX ORDER — REGADENOSON 0.08 MG/ML
0.4 INJECTION, SOLUTION INTRAVENOUS
Status: COMPLETED | OUTPATIENT
Start: 2025-07-21 | End: 2025-07-21

## 2025-07-21 RX ADMIN — TETROFOSMIN 1 DOSE: 1.38 INJECTION, POWDER, LYOPHILIZED, FOR SOLUTION INTRAVENOUS at 12:59

## 2025-07-21 RX ADMIN — TETROFOSMIN 1 DOSE: 1.38 INJECTION, POWDER, LYOPHILIZED, FOR SOLUTION INTRAVENOUS at 13:34

## 2025-07-21 RX ADMIN — REGADENOSON 0.4 MG: 0.08 INJECTION, SOLUTION INTRAVENOUS at 13:34

## 2025-07-21 NOTE — TELEPHONE ENCOUNTER
Please inform patient stress test shows no evidence of tightly blocked coronary artery and is low risk.  She is at acceptable risk to proceed with hip surgery and may hold Eliquis 48 hours prior and resume soon as possible.

## 2025-07-24 ENCOUNTER — PREP FOR SURGERY (OUTPATIENT)
Dept: OTHER | Facility: HOSPITAL | Age: 85
End: 2025-07-24
Payer: MEDICARE

## 2025-07-24 DIAGNOSIS — M16.11 PRIMARY OSTEOARTHRITIS OF RIGHT HIP: Primary | ICD-10-CM

## 2025-07-24 PROBLEM — M16.9 OA (OSTEOARTHRITIS) OF HIP: Status: ACTIVE | Noted: 2025-07-24

## 2025-07-24 RX ORDER — CHLORHEXIDINE GLUCONATE 500 MG/1
CLOTH TOPICAL 2 TIMES DAILY
OUTPATIENT
Start: 2025-07-24

## 2025-07-24 RX ORDER — MELOXICAM 15 MG/1
15 TABLET ORAL ONCE
OUTPATIENT
Start: 2025-07-24 | End: 2025-07-24

## (undated) DEVICE — TUBING, SUCTION, 1/4" X 10', STRAIGHT: Brand: MEDLINE

## (undated) DEVICE — THE TORRENT IRRIGATION SCOPE CONNECTOR IS USED WITH THE TORRENT IRRIGATION TUBING TO PROVIDE IRRIGATION FLUIDS SUCH AS STERILE WATER DURING GASTROINTESTINAL ENDOSCOPIC PROCEDURES WHEN USED IN CONJUNCTION WITH AN IRRIGATION PUMP (OR ELECTROSURGICAL UNIT).: Brand: TORRENT

## (undated) DEVICE — KT ORCA ORCAPOD DISP STRL

## (undated) DEVICE — SENSR O2 OXIMAX FNGR A/ 18IN NONSTR

## (undated) DEVICE — BITEBLOCK OMNI BLOC

## (undated) DEVICE — ERBE NESSY®PLATE 170 SPLIT; 168CM²; CABLE 3M: Brand: ERBE

## (undated) DEVICE — FRCP BX RADJAW4 NDL 2.8 240CM LG OG BX40

## (undated) DEVICE — LN SMPL CO2 SHTRM SD STREAM W/M LUER

## (undated) DEVICE — APC PROBE 2200 A, SINGLE USE OD 2.3MM/6.9FR; L. 2.2M/7.2FT: Brand: ERBE

## (undated) DEVICE — CANN O2 ETCO2 FITS ALL CONN CO2 SMPL A/ 7IN DISP LF

## (undated) DEVICE — ADAPT CLN BIOGUARD AIR/H2O DISP